# Patient Record
Sex: MALE | Race: WHITE | NOT HISPANIC OR LATINO | Employment: FULL TIME | URBAN - METROPOLITAN AREA
[De-identification: names, ages, dates, MRNs, and addresses within clinical notes are randomized per-mention and may not be internally consistent; named-entity substitution may affect disease eponyms.]

---

## 2017-12-05 ENCOUNTER — ALLSCRIPTS OFFICE VISIT (OUTPATIENT)
Dept: OTHER | Facility: OTHER | Age: 27
End: 2017-12-05

## 2017-12-05 DIAGNOSIS — F41.8 OTHER SPECIFIED ANXIETY DISORDERS: ICD-10-CM

## 2017-12-05 DIAGNOSIS — K62.5 HEMORRHAGE OF ANUS AND RECTUM: ICD-10-CM

## 2017-12-05 DIAGNOSIS — Z13.6 ENCOUNTER FOR SCREENING FOR CARDIOVASCULAR DISORDERS: ICD-10-CM

## 2017-12-06 NOTE — PROGRESS NOTES
Assessment    1  Never a smoker   2  Blood per rectum (569 3) (K62 5)    Plan  Blood per rectum    · Follow Up if Not Better Evaluation and Treatment  Follow-up  Status: Complete  Done:05Dec2017  Blood per rectum, Depression with anxiety, Encounter for screening for cardiovasculardisorders    · (1) CBC/PLT/DIFF; Status:Active; Requested for:05Dec2017;    · (1) COMPREHENSIVE METABOLIC PANEL; Status:Active; Requested for:05Dec2017;    · (1) LIPID PANEL, FASTING; Status:Active; Requested for:05Dec2017;    · (1) TSH; Status:Active; Requested for:05Dec2017;   Flu vaccine need    · Stop: Fluzone Quadrivalent Intramuscular Suspension  Screening for depression    · *VB - Depression Follow Up With Primary Care Provider Evaluation and Treatment Follow-up  Status: Hold For - Scheduling,Retrospective Authorization  Requested for:05Dec2017    Discussion/Summary    Certainly does sound like hemorrhoids  Patient deferred rectal exam  I told him to increase his fluid in fiber  If symptoms continue or increase, he will call and we will refer to GI  We will get fasting blood work  Call symptoms continue increase  Possible side effects of new medications were reviewed with the patient/guardian today  The treatment plan was reviewed with the patient/guardian  The patient/guardian understands and agrees with the treatment plan      Chief Complaint    1  Bright Red Blood Per Rectum  Blood per rectum      History of Present Illness  HPI: Patient states Thanksgiving Day had someone felt nauseated and had some regurgitation  Next day had constipation struggle to go to the bathroom and noticed blood on his stool and on the tissue  Through this past week has had blood on the stool and toilet tissue, though it is improving  Certainly when it is easier for him to go, less blood seen  Patient denies feeling any masses  Does not hurt when he goes to the bathroom  No weight loss, no fever chills  Admits to not eating a lot of fiber      Bright Red Blood Per Rectum:   Haider Ricardo presents with complaints of intermittent episodes of mild bright red blood per rectum, described as blood coating the stool, blood streaks on toilet paper and with bowel movements  Episodes started about 10 days ago  He is currently experiencing bright red blood per rectum  Symptoms are improving  Associated symptoms include constipation, but-- no abdominal pain,-- no abdominal distention,-- no nausea,-- no vomiting,-- no hematemesis,-- no diarrhea,-- no rectal mass,-- no rectal pain,-- no rectal itching,-- no rectal urgency,-- no weight loss,-- no fever-- and-- no chills  Review of Systems   Constitutional: no fever or chills, feels well, no tiredness, no recent weight loss or weight gain  Cardiovascular: no complaints of slow or fast heart rate, no chest pain, no palpitations, no leg claudication or lower extremity edema  Respiratory: no complaints of shortness of breath, no wheezing or cough, no dyspnea on exertion, no orthopnea or PND  Gastrointestinal: as noted in HPI  Genitourinary: no complaints of dysuria or incontinence, no hesitancy, no nocturia, no genital lesion, no inadequacy of penile erection  Active Problems  1  Abdominal pain (789 00) (R10 9)   2  Contact dermatitis (692 9) (L25 9)   3  Depression with anxiety (300 4) (F41 8)   4  Flu vaccine need (V04 81) (Z23)   5  Hemianopia, homonymous, left (368 46) (H53 462)   6  Local infection of wound, initial encounter   7  Screening for depression (V79 0) (Z13 89)   8  Scrotal disorder (608 9) (N50 9)   9  Skin lesion (709 9) (L98 9)   10  Tinea pedis (110 4) (B35 3)   11  Viral gastroenteritis (008 8) (A08 4)    Family History  Paternal Grandfather    1  Family history of Type 2 diabetes mellitus (250 00) (E11 9)  Family History Reviewed: The family history was reviewed and updated today         Social History   · Never a smoker   · Never Drank Alcohol  The social history was reviewed and updated today  The social history was reviewed and is unchanged  Surgical History  Surgical History Reviewed: The surgical history was reviewed and updated today  Current Meds   1  No Reported Medications Recorded    The medication list was reviewed and updated today  Allergies  1  No Known Drug Allergies    Vitals   Recorded: 09OFG1976 72:40MR   Systolic 029   Diastolic 78   Height 6 ft 1 in   Weight 218 lb 4 oz   BMI Calculated 28 79   BSA Calculated 2 23       Physical Exam   Constitutional  General appearance: No acute distress, well appearing and well nourished  Abdomen Rectal exam deferred  Psychiatric  Orientation to person, place and time: Normal    Mood and affect: Normal          Results/Data  PHQ-9 Adult Depression Screening [id number] 08:51AM User, [employer]     Test Name Result Flag Reference   PHQ-9 Adult Depression Score 11       Over the last two weeks, how often have you been bothered by any of the following problems? Little interest or pleasure in doing things: Several days - 1 Feeling down, depressed, or hopeless: Not at all - 0 Trouble falling or staying asleep, or sleeping too much: Nearly every day - 3 Feeling tired or having little energy: Nearly every day - 3 Poor appetite or over eating: Nearly every day - 3 Feeling bad about yourself - or that you are a failure or have let yourself or your family down: Not at all - 0 Trouble concentrating on things, such as reading the newspaper or watching television: Not at all - 0 Moving or speaking so slowly that other people could have noticed   Or the opposite -  being so fidgety or restless that you have been moving around a lot more than usual: Several days - 1 Thoughts that you would be better off dead, or of hurting yourself in some way: Not at all - 0   PHQ-9 Adult Depression Screening Positive     PHQ-9 Difficulty Level Somewhat difficult     PHQ-9 Severity Moderate Depression           Signatures   Electronically signed by : Cj Villanueva Glenny Patel DO; Dec  5 2017  9:27AM EST                       (Author)

## 2018-01-23 VITALS
DIASTOLIC BLOOD PRESSURE: 78 MMHG | SYSTOLIC BLOOD PRESSURE: 128 MMHG | WEIGHT: 218.25 LBS | BODY MASS INDEX: 28.93 KG/M2 | HEIGHT: 73 IN

## 2018-01-26 ENCOUNTER — TRANSCRIBE ORDERS (OUTPATIENT)
Dept: ADMINISTRATIVE | Facility: HOSPITAL | Age: 28
End: 2018-01-26

## 2018-01-26 ENCOUNTER — APPOINTMENT (OUTPATIENT)
Dept: LAB | Facility: HOSPITAL | Age: 28
End: 2018-01-26
Payer: COMMERCIAL

## 2018-01-26 DIAGNOSIS — Z13.6 ENCOUNTER FOR SCREENING FOR CARDIOVASCULAR DISORDERS: ICD-10-CM

## 2018-01-26 DIAGNOSIS — F41.8 OTHER SPECIFIED ANXIETY DISORDERS: ICD-10-CM

## 2018-01-26 DIAGNOSIS — K62.5 HEMORRHAGE OF ANUS AND RECTUM: ICD-10-CM

## 2018-01-26 LAB
ALBUMIN SERPL BCP-MCNC: 4.2 G/DL (ref 3.5–5)
ALP SERPL-CCNC: 78 U/L (ref 46–116)
ALT SERPL W P-5'-P-CCNC: 29 U/L (ref 12–78)
ANION GAP SERPL CALCULATED.3IONS-SCNC: 7 MMOL/L (ref 4–13)
AST SERPL W P-5'-P-CCNC: 22 U/L (ref 5–45)
BASOPHILS # BLD AUTO: 0.08 THOUSANDS/ΜL (ref 0–0.1)
BASOPHILS NFR BLD AUTO: 1 % (ref 0–1)
BILIRUB SERPL-MCNC: 0.6 MG/DL (ref 0.2–1)
BUN SERPL-MCNC: 13 MG/DL (ref 5–25)
CALCIUM SERPL-MCNC: 9 MG/DL (ref 8.3–10.1)
CHLORIDE SERPL-SCNC: 103 MMOL/L (ref 100–108)
CHOLEST SERPL-MCNC: 161 MG/DL (ref 50–200)
CO2 SERPL-SCNC: 29 MMOL/L (ref 21–32)
CREAT SERPL-MCNC: 1.3 MG/DL (ref 0.6–1.3)
EOSINOPHIL # BLD AUTO: 0.26 THOUSAND/ΜL (ref 0–0.61)
EOSINOPHIL NFR BLD AUTO: 4 % (ref 0–6)
ERYTHROCYTE [DISTWIDTH] IN BLOOD BY AUTOMATED COUNT: 13 % (ref 11.6–15.1)
GFR SERPL CREATININE-BSD FRML MDRD: 75 ML/MIN/1.73SQ M
GLUCOSE SERPL-MCNC: 94 MG/DL (ref 65–140)
HCT VFR BLD AUTO: 45.3 % (ref 36.5–49.3)
HDLC SERPL-MCNC: 52 MG/DL (ref 40–60)
HGB BLD-MCNC: 15.6 G/DL (ref 12–17)
LDLC SERPL CALC-MCNC: 77 MG/DL (ref 0–100)
LYMPHOCYTES # BLD AUTO: 1.26 THOUSANDS/ΜL (ref 0.6–4.47)
LYMPHOCYTES NFR BLD AUTO: 20 % (ref 14–44)
MCH RBC QN AUTO: 29.7 PG (ref 26.8–34.3)
MCHC RBC AUTO-ENTMCNC: 34.4 G/DL (ref 31.4–37.4)
MCV RBC AUTO: 86 FL (ref 82–98)
MONOCYTES # BLD AUTO: 0.54 THOUSAND/ΜL (ref 0.17–1.22)
MONOCYTES NFR BLD AUTO: 8 % (ref 4–12)
NEUTROPHILS # BLD AUTO: 4.33 THOUSANDS/ΜL (ref 1.85–7.62)
NEUTS SEG NFR BLD AUTO: 67 % (ref 43–75)
PLATELET # BLD AUTO: 246 THOUSANDS/UL (ref 149–390)
PMV BLD AUTO: 10.9 FL (ref 8.9–12.7)
POTASSIUM SERPL-SCNC: 3.9 MMOL/L (ref 3.5–5.3)
PROT SERPL-MCNC: 7.6 G/DL (ref 6.4–8.2)
RBC # BLD AUTO: 5.26 MILLION/UL (ref 3.88–5.62)
SODIUM SERPL-SCNC: 139 MMOL/L (ref 136–145)
TRIGL SERPL-MCNC: 158 MG/DL
TSH SERPL DL<=0.05 MIU/L-ACNC: 0.95 UIU/ML (ref 0.36–3.74)
WBC # BLD AUTO: 6.47 THOUSAND/UL (ref 4.31–10.16)

## 2018-01-26 PROCEDURE — 36415 COLL VENOUS BLD VENIPUNCTURE: CPT

## 2018-01-26 PROCEDURE — 85025 COMPLETE CBC W/AUTO DIFF WBC: CPT

## 2018-01-26 PROCEDURE — 80053 COMPREHEN METABOLIC PANEL: CPT

## 2018-01-26 PROCEDURE — 80061 LIPID PANEL: CPT

## 2018-01-26 PROCEDURE — 84443 ASSAY THYROID STIM HORMONE: CPT

## 2019-06-18 ENCOUNTER — OFFICE VISIT (OUTPATIENT)
Dept: URGENT CARE | Facility: CLINIC | Age: 29
End: 2019-06-18
Payer: COMMERCIAL

## 2019-06-18 VITALS
SYSTOLIC BLOOD PRESSURE: 138 MMHG | WEIGHT: 209 LBS | OXYGEN SATURATION: 98 % | DIASTOLIC BLOOD PRESSURE: 88 MMHG | BODY MASS INDEX: 27.7 KG/M2 | HEIGHT: 73 IN | TEMPERATURE: 98.6 F | HEART RATE: 88 BPM | RESPIRATION RATE: 20 BRPM

## 2019-06-18 DIAGNOSIS — R19.7 DIARRHEA, UNSPECIFIED TYPE: Primary | ICD-10-CM

## 2019-06-18 PROCEDURE — 99213 OFFICE O/P EST LOW 20 MIN: CPT | Performed by: PHYSICIAN ASSISTANT

## 2019-06-18 RX ORDER — DICYCLOMINE HCL 20 MG
20 TABLET ORAL EVERY 6 HOURS
Qty: 30 TABLET | Refills: 0 | Status: SHIPPED | OUTPATIENT
Start: 2019-06-18 | End: 2019-06-28 | Stop reason: HOSPADM

## 2019-06-20 ENCOUNTER — HOSPITAL ENCOUNTER (EMERGENCY)
Facility: HOSPITAL | Age: 29
Discharge: HOME | End: 2019-06-20
Attending: EMERGENCY MEDICINE

## 2019-06-20 ENCOUNTER — APPOINTMENT (EMERGENCY)
Dept: CT IMAGING | Facility: HOSPITAL | Age: 29
End: 2019-06-20

## 2019-06-20 VITALS
HEART RATE: 90 BPM | TEMPERATURE: 97.5 F | SYSTOLIC BLOOD PRESSURE: 129 MMHG | RESPIRATION RATE: 18 BRPM | DIASTOLIC BLOOD PRESSURE: 73 MMHG | OXYGEN SATURATION: 98 % | BODY MASS INDEX: 28.42 KG/M2 | WEIGHT: 215.39 LBS

## 2019-06-20 DIAGNOSIS — K51.00 PANCOLITIS (HCC): Primary | ICD-10-CM

## 2019-06-20 LAB
ALBUMIN SERPL BCP-MCNC: 3.3 G/DL (ref 3.5–5)
ALP SERPL-CCNC: 119 U/L (ref 46–116)
ALT SERPL W P-5'-P-CCNC: 28 U/L (ref 12–78)
ANION GAP SERPL CALCULATED.3IONS-SCNC: 9 MMOL/L (ref 4–13)
AST SERPL W P-5'-P-CCNC: 16 U/L (ref 5–45)
BASOPHILS # BLD AUTO: 0.1 THOUSANDS/ΜL (ref 0–0.1)
BASOPHILS NFR BLD AUTO: 1 % (ref 0–1)
BILIRUB SERPL-MCNC: 0.4 MG/DL (ref 0.2–1)
BUN SERPL-MCNC: 11 MG/DL (ref 5–25)
CALCIUM SERPL-MCNC: 8.7 MG/DL (ref 8.3–10.1)
CAMPYLOBACTER DNA SPEC NAA+PROBE: NORMAL
CHLORIDE SERPL-SCNC: 103 MMOL/L (ref 100–108)
CO2 SERPL-SCNC: 29 MMOL/L (ref 21–32)
CREAT SERPL-MCNC: 1.48 MG/DL (ref 0.6–1.3)
EOSINOPHIL # BLD AUTO: 0.61 THOUSAND/ΜL (ref 0–0.61)
EOSINOPHIL NFR BLD AUTO: 4 % (ref 0–6)
ERYTHROCYTE [DISTWIDTH] IN BLOOD BY AUTOMATED COUNT: 12.4 % (ref 11.6–15.1)
GFR SERPL CREATININE-BSD FRML MDRD: 64 ML/MIN/1.73SQ M
GLUCOSE SERPL-MCNC: 88 MG/DL (ref 65–140)
HCT VFR BLD AUTO: 41.9 % (ref 36.5–49.3)
HGB BLD-MCNC: 13.8 G/DL (ref 12–17)
IMM GRANULOCYTES # BLD AUTO: 0.1 THOUSAND/UL (ref 0–0.2)
IMM GRANULOCYTES NFR BLD AUTO: 1 % (ref 0–2)
LIPASE SERPL-CCNC: 70 U/L (ref 73–393)
LYMPHOCYTES # BLD AUTO: 2.1 THOUSANDS/ΜL (ref 0.6–4.47)
LYMPHOCYTES NFR BLD AUTO: 13 % (ref 14–44)
MCH RBC QN AUTO: 29.4 PG (ref 26.8–34.3)
MCHC RBC AUTO-ENTMCNC: 32.9 G/DL (ref 31.4–37.4)
MCV RBC AUTO: 89 FL (ref 82–98)
MONOCYTES # BLD AUTO: 1.43 THOUSAND/ΜL (ref 0.17–1.22)
MONOCYTES NFR BLD AUTO: 9 % (ref 4–12)
NEUTROPHILS # BLD AUTO: 12.11 THOUSANDS/ΜL (ref 1.85–7.62)
NEUTS SEG NFR BLD AUTO: 72 % (ref 43–75)
NRBC BLD AUTO-RTO: 0 /100 WBCS
PLATELET # BLD AUTO: 378 THOUSANDS/UL (ref 149–390)
PMV BLD AUTO: 10.1 FL (ref 8.9–12.7)
POTASSIUM SERPL-SCNC: 3.3 MMOL/L (ref 3.5–5.3)
PROT SERPL-MCNC: 7.8 G/DL (ref 6.4–8.2)
RBC # BLD AUTO: 4.7 MILLION/UL (ref 3.88–5.62)
SALMONELLA DNA SPEC QL NAA+PROBE: NORMAL
SHIGA TOXIN STX GENE SPEC NAA+PROBE: NORMAL
SHIGELLA DNA SPEC QL NAA+PROBE: NORMAL
SODIUM SERPL-SCNC: 141 MMOL/L (ref 136–145)
WBC # BLD AUTO: 16.45 THOUSAND/UL (ref 4.31–10.16)

## 2019-06-20 PROCEDURE — 89055 LEUKOCYTE ASSESSMENT FECAL: CPT | Performed by: EMERGENCY MEDICINE

## 2019-06-20 PROCEDURE — 85025 COMPLETE CBC W/AUTO DIFF WBC: CPT | Performed by: EMERGENCY MEDICINE

## 2019-06-20 PROCEDURE — 36415 COLL VENOUS BLD VENIPUNCTURE: CPT | Performed by: EMERGENCY MEDICINE

## 2019-06-20 PROCEDURE — 87505 NFCT AGENT DETECTION GI: CPT | Performed by: EMERGENCY MEDICINE

## 2019-06-20 PROCEDURE — 74177 CT ABD & PELVIS W/CONTRAST: CPT

## 2019-06-20 PROCEDURE — 87177 OVA AND PARASITES SMEARS: CPT | Performed by: EMERGENCY MEDICINE

## 2019-06-20 PROCEDURE — 80053 COMPREHEN METABOLIC PANEL: CPT | Performed by: EMERGENCY MEDICINE

## 2019-06-20 PROCEDURE — 99285 EMERGENCY DEPT VISIT HI MDM: CPT

## 2019-06-20 PROCEDURE — 83690 ASSAY OF LIPASE: CPT | Performed by: EMERGENCY MEDICINE

## 2019-06-20 PROCEDURE — 99284 EMERGENCY DEPT VISIT MOD MDM: CPT | Performed by: EMERGENCY MEDICINE

## 2019-06-20 PROCEDURE — 87209 SMEAR COMPLEX STAIN: CPT | Performed by: EMERGENCY MEDICINE

## 2019-06-20 PROCEDURE — 96360 HYDRATION IV INFUSION INIT: CPT

## 2019-06-20 RX ORDER — METRONIDAZOLE 500 MG/1
500 TABLET ORAL EVERY 6 HOURS
Qty: 40 TABLET | Refills: 0 | Status: SHIPPED | OUTPATIENT
Start: 2019-06-20 | End: 2019-06-28 | Stop reason: HOSPADM

## 2019-06-20 RX ORDER — CIPROFLOXACIN 2 MG/ML
400 INJECTION, SOLUTION INTRAVENOUS ONCE
Status: DISCONTINUED | OUTPATIENT
Start: 2019-06-20 | End: 2019-06-20

## 2019-06-20 RX ORDER — METRONIDAZOLE 500 MG/1
500 TABLET ORAL ONCE
Status: COMPLETED | OUTPATIENT
Start: 2019-06-20 | End: 2019-06-20

## 2019-06-20 RX ORDER — CIPROFLOXACIN 500 MG/1
500 TABLET, FILM COATED ORAL ONCE
Status: COMPLETED | OUTPATIENT
Start: 2019-06-20 | End: 2019-06-20

## 2019-06-20 RX ORDER — CIPROFLOXACIN 500 MG/1
500 TABLET, FILM COATED ORAL 2 TIMES DAILY
Qty: 20 TABLET | Refills: 0 | Status: SHIPPED | OUTPATIENT
Start: 2019-06-20 | End: 2019-06-28 | Stop reason: HOSPADM

## 2019-06-20 RX ORDER — POTASSIUM CHLORIDE 20 MEQ/1
40 TABLET, EXTENDED RELEASE ORAL ONCE
Status: COMPLETED | OUTPATIENT
Start: 2019-06-20 | End: 2019-06-20

## 2019-06-20 RX ADMIN — IOHEXOL 100 ML: 350 INJECTION, SOLUTION INTRAVENOUS at 04:35

## 2019-06-20 RX ADMIN — METRONIDAZOLE 500 MG: 500 TABLET, FILM COATED ORAL at 05:55

## 2019-06-20 RX ADMIN — CIPROFLOXACIN 500 MG: 500 TABLET, FILM COATED ORAL at 05:55

## 2019-06-20 RX ADMIN — SODIUM CHLORIDE 1000 ML: 0.9 INJECTION, SOLUTION INTRAVENOUS at 03:15

## 2019-06-20 RX ADMIN — POTASSIUM CHLORIDE 40 MEQ: 1500 TABLET, EXTENDED RELEASE ORAL at 05:55

## 2019-06-22 LAB — O+P STL CONC: NORMAL

## 2019-06-24 ENCOUNTER — HOSPITAL ENCOUNTER (INPATIENT)
Facility: HOSPITAL | Age: 29
LOS: 4 days | Discharge: HOME | DRG: 386 | End: 2019-06-28
Attending: EMERGENCY MEDICINE | Admitting: FAMILY MEDICINE

## 2019-06-24 ENCOUNTER — APPOINTMENT (EMERGENCY)
Dept: RADIOLOGY | Facility: HOSPITAL | Age: 29
DRG: 386 | End: 2019-06-24

## 2019-06-24 ENCOUNTER — APPOINTMENT (EMERGENCY)
Dept: CT IMAGING | Facility: HOSPITAL | Age: 29
DRG: 386 | End: 2019-06-24

## 2019-06-24 DIAGNOSIS — K51.019 ULCERATIVE PANCOLITIS WITH COMPLICATION (HCC): ICD-10-CM

## 2019-06-24 DIAGNOSIS — K51.00 PANCOLITIS (HCC): Primary | ICD-10-CM

## 2019-06-24 PROBLEM — N17.9 AKI (ACUTE KIDNEY INJURY) (HCC): Status: ACTIVE | Noted: 2019-06-24

## 2019-06-24 LAB
ALBUMIN SERPL BCP-MCNC: 3.6 G/DL (ref 3.5–5)
ALP SERPL-CCNC: 109 U/L (ref 46–116)
ALT SERPL W P-5'-P-CCNC: 25 U/L (ref 12–78)
ANION GAP SERPL CALCULATED.3IONS-SCNC: 7 MMOL/L (ref 4–13)
APTT PPP: 37 SECONDS (ref 26–38)
AST SERPL W P-5'-P-CCNC: 26 U/L (ref 5–45)
ATRIAL RATE: 88 BPM
BACTERIA UR QL AUTO: ABNORMAL /HPF
BASOPHILS # BLD AUTO: 0.11 THOUSANDS/ΜL (ref 0–0.1)
BASOPHILS NFR BLD AUTO: 1 % (ref 0–1)
BILIRUB SERPL-MCNC: 0.5 MG/DL (ref 0.2–1)
BILIRUB UR QL STRIP: NEGATIVE
BUN SERPL-MCNC: 9 MG/DL (ref 5–25)
CALCIUM SERPL-MCNC: 9.4 MG/DL (ref 8.3–10.1)
CHLORIDE SERPL-SCNC: 100 MMOL/L (ref 100–108)
CK SERPL-CCNC: 67 U/L (ref 39–308)
CLARITY UR: CLEAR
CO2 SERPL-SCNC: 30 MMOL/L (ref 21–32)
COLOR UR: YELLOW
CREAT SERPL-MCNC: 1.48 MG/DL (ref 0.6–1.3)
EOSINOPHIL # BLD AUTO: 0.25 THOUSAND/ΜL (ref 0–0.61)
EOSINOPHIL NFR BLD AUTO: 2 % (ref 0–6)
ERYTHROCYTE [DISTWIDTH] IN BLOOD BY AUTOMATED COUNT: 12.5 % (ref 11.6–15.1)
GFR SERPL CREATININE-BSD FRML MDRD: 64 ML/MIN/1.73SQ M
GLUCOSE SERPL-MCNC: 100 MG/DL (ref 65–140)
GLUCOSE UR STRIP-MCNC: NEGATIVE MG/DL
HCT VFR BLD AUTO: 49.9 % (ref 36.5–49.3)
HGB BLD-MCNC: 16.3 G/DL (ref 12–17)
HGB UR QL STRIP.AUTO: NEGATIVE
HOLD SPECIMEN: NORMAL
IMM GRANULOCYTES # BLD AUTO: 0.09 THOUSAND/UL (ref 0–0.2)
IMM GRANULOCYTES NFR BLD AUTO: 1 % (ref 0–2)
INR PPP: 1.21 (ref 0.86–1.17)
KETONES UR STRIP-MCNC: ABNORMAL MG/DL
LACTATE SERPL-SCNC: 1.5 MMOL/L (ref 0.5–2)
LEUKOCYTE ESTERASE UR QL STRIP: ABNORMAL
LIPASE SERPL-CCNC: 60 U/L (ref 73–393)
LYMPHOCYTES # BLD AUTO: 1.57 THOUSANDS/ΜL (ref 0.6–4.47)
LYMPHOCYTES NFR BLD AUTO: 10 % (ref 14–44)
MAGNESIUM SERPL-MCNC: 1.9 MG/DL (ref 1.6–2.6)
MCH RBC QN AUTO: 28.5 PG (ref 26.8–34.3)
MCHC RBC AUTO-ENTMCNC: 32.7 G/DL (ref 31.4–37.4)
MCV RBC AUTO: 87 FL (ref 82–98)
MONOCYTES # BLD AUTO: 1.02 THOUSAND/ΜL (ref 0.17–1.22)
MONOCYTES NFR BLD AUTO: 7 % (ref 4–12)
NEUTROPHILS # BLD AUTO: 12.25 THOUSANDS/ΜL (ref 1.85–7.62)
NEUTS SEG NFR BLD AUTO: 79 % (ref 43–75)
NITRITE UR QL STRIP: NEGATIVE
NON-SQ EPI CELLS URNS QL MICRO: ABNORMAL /HPF
NRBC BLD AUTO-RTO: 0 /100 WBCS
P AXIS: 66 DEGREES
PH UR STRIP.AUTO: 5.5 [PH]
PLATELET # BLD AUTO: 531 THOUSANDS/UL (ref 149–390)
PMV BLD AUTO: 9.8 FL (ref 8.9–12.7)
POTASSIUM SERPL-SCNC: 3.6 MMOL/L (ref 3.5–5.3)
PR INTERVAL: 164 MS
PROCALCITONIN SERPL-MCNC: 0.08 NG/ML
PROCALCITONIN SERPL-MCNC: 0.13 NG/ML
PROT SERPL-MCNC: 8.5 G/DL (ref 6.4–8.2)
PROT UR STRIP-MCNC: NEGATIVE MG/DL
PROTHROMBIN TIME: 14.7 SECONDS (ref 11.8–14.2)
QRS AXIS: 70 DEGREES
QRSD INTERVAL: 84 MS
QT INTERVAL: 376 MS
QTC INTERVAL: 454 MS
RBC # BLD AUTO: 5.71 MILLION/UL (ref 3.88–5.62)
RBC #/AREA URNS AUTO: ABNORMAL /HPF
SODIUM SERPL-SCNC: 137 MMOL/L (ref 136–145)
SP GR UR STRIP.AUTO: 1.02 (ref 1–1.03)
T WAVE AXIS: 24 DEGREES
TROPONIN I SERPL-MCNC: <0.02 NG/ML
UROBILINOGEN UR QL STRIP.AUTO: 0.2 E.U./DL
VENTRICULAR RATE: 88 BPM
WBC # BLD AUTO: 15.29 THOUSAND/UL (ref 4.31–10.16)
WBC #/AREA URNS AUTO: ABNORMAL /HPF
WBC SPEC QL GRAM STN: ABNORMAL

## 2019-06-24 PROCEDURE — 84484 ASSAY OF TROPONIN QUANT: CPT

## 2019-06-24 PROCEDURE — 93005 ELECTROCARDIOGRAM TRACING: CPT

## 2019-06-24 PROCEDURE — 87177 OVA AND PARASITES SMEARS: CPT | Performed by: FAMILY MEDICINE

## 2019-06-24 PROCEDURE — 96365 THER/PROPH/DIAG IV INF INIT: CPT

## 2019-06-24 PROCEDURE — 96375 TX/PRO/DX INJ NEW DRUG ADDON: CPT

## 2019-06-24 PROCEDURE — 84145 PROCALCITONIN (PCT): CPT | Performed by: FAMILY MEDICINE

## 2019-06-24 PROCEDURE — 99222 1ST HOSP IP/OBS MODERATE 55: CPT | Performed by: FAMILY MEDICINE

## 2019-06-24 PROCEDURE — 81001 URINALYSIS AUTO W/SCOPE: CPT

## 2019-06-24 PROCEDURE — 87209 SMEAR COMPLEX STAIN: CPT | Performed by: FAMILY MEDICINE

## 2019-06-24 PROCEDURE — 87040 BLOOD CULTURE FOR BACTERIA: CPT | Performed by: EMERGENCY MEDICINE

## 2019-06-24 PROCEDURE — 83735 ASSAY OF MAGNESIUM: CPT | Performed by: EMERGENCY MEDICINE

## 2019-06-24 PROCEDURE — 85025 COMPLETE CBC W/AUTO DIFF WBC: CPT

## 2019-06-24 PROCEDURE — 85730 THROMBOPLASTIN TIME PARTIAL: CPT | Performed by: EMERGENCY MEDICINE

## 2019-06-24 PROCEDURE — 93010 ELECTROCARDIOGRAM REPORT: CPT | Performed by: INTERNAL MEDICINE

## 2019-06-24 PROCEDURE — 71045 X-RAY EXAM CHEST 1 VIEW: CPT

## 2019-06-24 PROCEDURE — 87505 NFCT AGENT DETECTION GI: CPT | Performed by: FAMILY MEDICINE

## 2019-06-24 PROCEDURE — 83690 ASSAY OF LIPASE: CPT

## 2019-06-24 PROCEDURE — 85610 PROTHROMBIN TIME: CPT | Performed by: EMERGENCY MEDICINE

## 2019-06-24 PROCEDURE — 96368 THER/DIAG CONCURRENT INF: CPT

## 2019-06-24 PROCEDURE — 89055 LEUKOCYTE ASSESSMENT FECAL: CPT | Performed by: FAMILY MEDICINE

## 2019-06-24 PROCEDURE — 87493 C DIFF AMPLIFIED PROBE: CPT | Performed by: FAMILY MEDICINE

## 2019-06-24 PROCEDURE — 80053 COMPREHEN METABOLIC PANEL: CPT

## 2019-06-24 PROCEDURE — 99285 EMERGENCY DEPT VISIT HI MDM: CPT | Performed by: EMERGENCY MEDICINE

## 2019-06-24 PROCEDURE — 83605 ASSAY OF LACTIC ACID: CPT | Performed by: EMERGENCY MEDICINE

## 2019-06-24 PROCEDURE — 84145 PROCALCITONIN (PCT): CPT | Performed by: EMERGENCY MEDICINE

## 2019-06-24 PROCEDURE — 83993 ASSAY FOR CALPROTECTIN FECAL: CPT | Performed by: FAMILY MEDICINE

## 2019-06-24 PROCEDURE — 74177 CT ABD & PELVIS W/CONTRAST: CPT

## 2019-06-24 PROCEDURE — 82550 ASSAY OF CK (CPK): CPT | Performed by: EMERGENCY MEDICINE

## 2019-06-24 PROCEDURE — 36415 COLL VENOUS BLD VENIPUNCTURE: CPT

## 2019-06-24 PROCEDURE — 82272 OCCULT BLD FECES 1-3 TESTS: CPT | Performed by: FAMILY MEDICINE

## 2019-06-24 PROCEDURE — 96376 TX/PRO/DX INJ SAME DRUG ADON: CPT

## 2019-06-24 PROCEDURE — 99285 EMERGENCY DEPT VISIT HI MDM: CPT

## 2019-06-24 RX ORDER — ONDANSETRON 2 MG/ML
4 INJECTION INTRAMUSCULAR; INTRAVENOUS ONCE
Status: COMPLETED | OUTPATIENT
Start: 2019-06-24 | End: 2019-06-24

## 2019-06-24 RX ORDER — LORAZEPAM 2 MG/ML
1 INJECTION INTRAMUSCULAR ONCE
Status: COMPLETED | OUTPATIENT
Start: 2019-06-24 | End: 2019-06-24

## 2019-06-24 RX ORDER — HYDROMORPHONE HCL/PF 1 MG/ML
1 SYRINGE (ML) INJECTION ONCE
Status: COMPLETED | OUTPATIENT
Start: 2019-06-24 | End: 2019-06-24

## 2019-06-24 RX ORDER — SODIUM CHLORIDE 9 MG/ML
125 INJECTION, SOLUTION INTRAVENOUS CONTINUOUS
Status: DISCONTINUED | OUTPATIENT
Start: 2019-06-24 | End: 2019-06-27

## 2019-06-24 RX ORDER — ONDANSETRON 2 MG/ML
INJECTION INTRAMUSCULAR; INTRAVENOUS
Status: COMPLETED
Start: 2019-06-24 | End: 2019-06-24

## 2019-06-24 RX ORDER — ONDANSETRON 2 MG/ML
4 INJECTION INTRAMUSCULAR; INTRAVENOUS EVERY 4 HOURS PRN
Status: DISCONTINUED | OUTPATIENT
Start: 2019-06-24 | End: 2019-06-28 | Stop reason: HOSPADM

## 2019-06-24 RX ORDER — KETOROLAC TROMETHAMINE 30 MG/ML
30 INJECTION, SOLUTION INTRAMUSCULAR; INTRAVENOUS ONCE
Status: COMPLETED | OUTPATIENT
Start: 2019-06-24 | End: 2019-06-24

## 2019-06-24 RX ORDER — PROMETHAZINE HYDROCHLORIDE 25 MG/ML
25 INJECTION, SOLUTION INTRAMUSCULAR; INTRAVENOUS ONCE
Status: COMPLETED | OUTPATIENT
Start: 2019-06-24 | End: 2019-06-24

## 2019-06-24 RX ADMIN — VANCOMYCIN HYDROCHLORIDE 125 MG: 500 INJECTION, POWDER, LYOPHILIZED, FOR SOLUTION INTRAVENOUS at 17:08

## 2019-06-24 RX ADMIN — ONDANSETRON 4 MG: 2 INJECTION INTRAMUSCULAR; INTRAVENOUS at 06:14

## 2019-06-24 RX ADMIN — VANCOMYCIN HYDROCHLORIDE 125 MG: 500 INJECTION, POWDER, LYOPHILIZED, FOR SOLUTION INTRAVENOUS at 12:21

## 2019-06-24 RX ADMIN — SODIUM CHLORIDE, SODIUM LACTATE, POTASSIUM CHLORIDE, AND CALCIUM CHLORIDE 1000 ML: .6; .31; .03; .02 INJECTION, SOLUTION INTRAVENOUS at 07:22

## 2019-06-24 RX ADMIN — ONDANSETRON 4 MG: 2 INJECTION INTRAMUSCULAR; INTRAVENOUS at 09:05

## 2019-06-24 RX ADMIN — IOHEXOL 100 ML: 350 INJECTION, SOLUTION INTRAVENOUS at 10:02

## 2019-06-24 RX ADMIN — SODIUM CHLORIDE 1000 ML: 0.9 INJECTION, SOLUTION INTRAVENOUS at 06:14

## 2019-06-24 RX ADMIN — VANCOMYCIN HYDROCHLORIDE 125 MG: 500 INJECTION, POWDER, LYOPHILIZED, FOR SOLUTION INTRAVENOUS at 23:51

## 2019-06-24 RX ADMIN — PIPERACILLIN AND TAZOBACTAM 3.38 G: 3; .375 INJECTION, POWDER, FOR SOLUTION INTRAVENOUS at 17:08

## 2019-06-24 RX ADMIN — PROMETHAZINE HYDROCHLORIDE 25 MG: 25 INJECTION, SOLUTION INTRAMUSCULAR; INTRAVENOUS at 11:00

## 2019-06-24 RX ADMIN — PIPERACILLIN AND TAZOBACTAM 3.38 G: 3; .375 INJECTION, POWDER, FOR SOLUTION INTRAVENOUS at 23:46

## 2019-06-24 RX ADMIN — KETOROLAC TROMETHAMINE 30 MG: 30 INJECTION, SOLUTION INTRAMUSCULAR; INTRAVENOUS at 23:46

## 2019-06-24 RX ADMIN — HYDROMORPHONE HYDROCHLORIDE 0.5 MG: 1 INJECTION, SOLUTION INTRAMUSCULAR; INTRAVENOUS; SUBCUTANEOUS at 06:14

## 2019-06-24 RX ADMIN — LORAZEPAM 1 MG: 2 INJECTION INTRAMUSCULAR; INTRAVENOUS at 06:26

## 2019-06-24 RX ADMIN — SODIUM CHLORIDE, SODIUM LACTATE, POTASSIUM CHLORIDE, AND CALCIUM CHLORIDE 1000 ML: .6; .31; .03; .02 INJECTION, SOLUTION INTRAVENOUS at 06:48

## 2019-06-24 RX ADMIN — SODIUM CHLORIDE, SODIUM LACTATE, POTASSIUM CHLORIDE, AND CALCIUM CHLORIDE 1000 ML: .6; .31; .03; .02 INJECTION, SOLUTION INTRAVENOUS at 07:54

## 2019-06-24 RX ADMIN — PIPERACILLIN AND TAZOBACTAM 3.38 G: 3; .375 INJECTION, POWDER, FOR SOLUTION INTRAVENOUS at 12:21

## 2019-06-24 RX ADMIN — SODIUM CHLORIDE 100 ML/HR: 0.9 INJECTION, SOLUTION INTRAVENOUS at 22:31

## 2019-06-24 RX ADMIN — PIPERACILLIN SODIUM AND TAZOBACTAM SODIUM 4500 MG OF PIPERACILLIN: 4; .5 INJECTION, POWDER, FOR SOLUTION INTRAVENOUS at 06:49

## 2019-06-24 RX ADMIN — SODIUM CHLORIDE 100 ML/HR: 0.9 INJECTION, SOLUTION INTRAVENOUS at 11:43

## 2019-06-24 RX ADMIN — IOHEXOL 50 ML: 240 INJECTION, SOLUTION INTRATHECAL; INTRAVASCULAR; INTRAVENOUS; ORAL at 08:12

## 2019-06-24 NOTE — PLAN OF CARE
Problem: GASTROINTESTINAL - ADULT  Goal: Minimal or absence of nausea and/or vomiting  Description  INTERVENTIONS:  - Administer IV fluids as ordered to ensure adequate hydration  - Maintain NPO status until nausea and vomiting are resolved  - Nasogastric tube as ordered  - Administer ordered antiemetic medications as needed  - Provide nonpharmacologic comfort measures as appropriate  - Advance diet as tolerated, if ordered  - Nutrition services referral to assist patient with adequate nutrition and appropriate food choices  Outcome: Progressing  Goal: Maintains adequate nutritional intake  Description  INTERVENTIONS:  - Monitor percentage of each meal consumed  - Identify factors contributing to decreased intake, treat as appropriate  - Assist with meals as needed  - Monitor I&O, WT and lab values  - Obtain nutrition services referral as needed  Outcome: Progressing     Problem: PAIN - ADULT  Goal: Verbalizes/displays adequate comfort level or baseline comfort level  Description  Interventions:  - Encourage patient to monitor pain and request assistance  - Assess pain using appropriate pain scale  - Administer analgesics based on type and severity of pain and evaluate response  - Implement non-pharmacological measures as appropriate and evaluate response  - Consider cultural and social influences on pain and pain management  - Notify physician/advanced practitioner if interventions unsuccessful or patient reports new pain  Outcome: Progressing     Problem: INFECTION - ADULT  Goal: Absence or prevention of progression during hospitalization  Description  INTERVENTIONS:  - Assess and monitor for signs and symptoms of infection  - Monitor lab/diagnostic results  - Monitor all insertion sites  - [place] appropriate cooling/warming therapies per order  - Administer medications as ordered  - Instruct and encourage patient and family to use good hand hygiene technique  - Identify and instruct in appropriate isolation precautions for identified infection/condition   Outcome: Progressing     Problem: Knowledge Deficit  Goal: Patient/family/caregiver demonstrates understanding of disease process, treatment plan, medications, and discharge instructions  Description  Complete learning assessment and assess knowledge base  Interventions:  - Provide teaching at level of understanding  - Provide teaching via preferred learning methods  Outcome: Progressing     Problem: Nutrition/Hydration-ADULT  Goal: Nutrient/Hydration intake appropriate for improving, restoring or maintaining nutritional needs  Description  Monitor and assess patient's nutrition/hydration status for malnutrition (ex- brittle hair, bruises, dry skin, pale skin and conjunctiva, muscle wasting, smooth red tongue, and disorientation)  Collaborate with interdisciplinary team and initiate plan and interventions as ordered  Monitor patient's weight and dietary intake as ordered or per policy  Utilize nutrition screening tool and intervene per policy  Determine patient's food preferences and provide high-protein, high-caloric foods as appropriate       INTERVENTIONS:  - Monitor oral intake, urinary output, labs, and treatment plans  - Assess nutrition and hydration status and recommend course of action  - Evaluate amount of meals eaten  - Assist patient with eating if necessary   - Allow adequate time for meals  - Recommend/ encourage appropriate diets, oral nutritional supplements, and vitamin/mineral supplements  - Order, calculate, and assess calorie counts as needed  - Recommend, monitor, and adjust tube feedings and TPN/PPN based on assessed needs  - Assess need for intravenous fluids  - Provide specific nutrition/hydration education as appropriate  - Include patient/family/caregiver in decisions related to nutrition  Outcome: Progressing

## 2019-06-24 NOTE — ED NOTES
Patient had episode of vomiting at this time, MD made aware and verbal order for 4mg of zofran was given       Samir Beckwith RN  06/24/19 7734

## 2019-06-24 NOTE — H&P
H&P Exam - Javier Dominguez 34 y o  male MRN: [id number]    Unit/Bed#: 500-10 Encounter: [id number]        OMA (acute kidney injury) [facility])  Assessment & Plan  Likely secondary to pre renal azotemia  Normal saline infusion and recheck renal function tomorrow morning    Pancolitis (HCC)  Assessment & Plan  Symptoms ongoing for past 6-8 weeks  Check for C diff and empirically treat with PO vanco until results are available  Check Enteric Panel  Check fecal Calprotectin   Check O&P   Check Fecal Leucocytes  Heme Test all stool  Empirically treat with IV Zosyn   Consult Gastroenterology  NPO Status  IVF  Antiemetics          History of Present Illness      The patient is a pleasant 29-year-old male without a significant past medical presents to emergency room today with complaint of worsening nausea, abdominal pain and diarrhea  Patient states symptoms began approximately 8 weeks ago and were initially flatulence and abdominal discomfort  He describes his flatulence as very foul smelling  As time progressed he began having loose watery dark and diarrhea and occurring 1 to 2 times a day  Ultimately he was having significant amount of bowel movements that were watery, he was unable to keep anything down by mouth, and over the past week he has noted bright blood per rectum when wiping  He stated he had fevers in the past 8 weeks but but has been afebrile over the past week  He denies any recent antibiotic use    He denies any recent travel / drinking well water / pond water    No sick contacts    First degree family member has crohns     Review of Systems   Constitutional: Positive for fever  Gastrointestinal: Positive for abdominal distention, abdominal pain, anal bleeding, blood in stool, diarrhea and nausea  All other systems reviewed and are negative  Historical Information   Past Medical History:   Diagnosis Date    Rhabdomyolysis 2011     History reviewed  No pertinent surgical history    Social History   Social History     Substance and Sexual Activity   Alcohol Use Not Currently    Frequency: Monthly or less    Drinks per session: 1 or 2     Social History     Substance and Sexual Activity   Drug Use Never     Social History     Tobacco Use   Smoking Status Never Smoker   Smokeless Tobacco Never Used     Family History: non-contributory    Meds/Allergies   all medications and allergies reviewed  No Known Allergies    Objective   First Vitals:   Blood Pressure: 140/78 (06/24/19 0555)  Pulse: 94 (06/24/19 0555)  Temperature: 98 1 °F (36 7 °C) (06/24/19 0555)  Temp Source: Temporal (06/24/19 0555)  Respirations: 18 (06/24/19 0555)  Height: 6' 1" (185 4 cm) (06/24/19 0555)  Weight - Scale: 91 7 kg (202 lb 2 6 oz) (06/24/19 0555)  SpO2: 99 % (06/24/19 0555)    Current Vitals:   Blood Pressure: 142/72 (06/24/19 1030)  Pulse: 85 (06/24/19 1030)  Temperature: 98 1 °F (36 7 °C) (06/24/19 0555)  Temp Source: Temporal (06/24/19 0555)  Respirations: 18 (06/24/19 1030)  Height: 6' 1" (185 4 cm) (06/24/19 0555)  Weight - Scale: 91 7 kg (202 lb 2 6 oz) (06/24/19 0555)  SpO2: 94 % (06/24/19 1030)      Intake/Output Summary (Last 24 hours) at 6/24/2019 1119  Last data filed at 6/24/2019 0814  Gross per 24 hour   Intake 3700 ml   Output    Net 3700 ml       Invasive Devices     Peripheral Intravenous Line            Peripheral IV 06/24/19 Right Antecubital less than 1 day                Physical Exam   Constitutional: He appears well-developed  HENT:   Head: Normocephalic and atraumatic  Mouth/Throat: No oropharyngeal exudate  Eyes: No scleral icterus  Neck: Neck supple  Cardiovascular: Normal rate and regular rhythm  Pulmonary/Chest: Effort normal and breath sounds normal  No stridor  No respiratory distress  He has no wheezes  Abdominal: Soft  Bowel sounds are normal  He exhibits no distension and no mass  There is no tenderness  There is no guarding  Musculoskeletal: Normal range of motion   He exhibits no edema  Neurological: He is alert  No cranial nerve deficit  Coordination normal    Skin: Skin is warm  Capillary refill takes 2 to 3 seconds  He is not diaphoretic  Lab Results:   Lab Results   Component Value Date    WBC 15 29 (H) 06/24/2019    HGB 16 3 06/24/2019    HCT 49 9 (H) 06/24/2019    MCV 87 06/24/2019     (H) 06/24/2019     Lab Results   Component Value Date    GLUCOSE 93 03/31/2015    CALCIUM 9 4 06/24/2019     03/31/2015    SODIUM 137 06/24/2019    K 3 6 06/24/2019    CO2 30 06/24/2019     06/24/2019    BUN 9 06/24/2019    CREATININE 1 48 (H) 06/24/2019       Imaging:   CT abdomen pelvis with contrast   Final Result   1  Pancolitis again demonstrated with wall thickening and adjacent fat stranding predominantly involving the transverse, left and rectosigmoid colon  This appears slightly improved from the prior exam   Continued follow-up to resolution suggested  Workstation performed: [id number]         XR chest portable   Final Result      No acute cardiopulmonary disease              Workstation performed: [id number]             EKG, Pathology, and Other Studies: Sinus    Code Status: Level 1 - Full Code  Advance Directive and Living Will:      Power of :    POLST:      Counseling / Coordination of Care:

## 2019-06-24 NOTE — ED PROCEDURE NOTE
PROCEDURE  ECG 12 Lead Documentation  Date/Time: 6/24/2019 7:02 AM  Performed by: Humphrey Urban MD  Authorized by: MD Humphrey Benavidez MD  07/07/19 5918

## 2019-06-24 NOTE — ASSESSMENT & PLAN NOTE
Symptoms ongoing for past 6-8 weeks, associated with 30lb weight loss, cramping abdominal pain, and poor oral intake  No hematochezia  Notes family hx of IBD  Treated with bentyl, cipro and flagyl with no improvement in outpatient setting     Check for C diff and empirically treat with PO vanco until results are available  Check Enteric Panel, fecal Calprotectin, O&P, Leucocytes  Heme Test all stool  Empirically treat with IV Zosyn   Consult Gastroenterology  NPO Status for now   IVF, antiemetics

## 2019-06-24 NOTE — ED PROCEDURE NOTE
PROCEDURE  ECG 12 Lead Documentation  Date/Time: 6/24/2019 7:00 AM  Performed by: Anderson Mujica MD  Authorized by: Anderson Mujica MD     Indications / Diagnosis:  88  ECG reviewed by me, the ED Provider: yes    Patient location:  ED  Previous ECG:     Comparison to cardiac monitor: Yes    Interpretation:     Interpretation: normal    Rate:     ECG rate:  88    ECG rate assessment: normal    Rhythm:     Rhythm: sinus rhythm    Ectopy:     Ectopy: none    QRS:     QRS axis:  Normal    QRS intervals:  Normal  Conduction:     Conduction: normal    ST segments:     ST segments:  Normal  T waves:     T waves: normal           Anderson Mujica MD  06/24/19 [id number]

## 2019-06-24 NOTE — ASSESSMENT & PLAN NOTE
Likely secondary to pre renal azotemia  Creatinine 1 48 on admission now 1 60  Increase rate IVF to 125cc/hr  Check PVR for completeness

## 2019-06-25 PROBLEM — D72.829 LEUKOCYTOSIS: Status: ACTIVE | Noted: 2019-06-25

## 2019-06-25 PROBLEM — D75.839 THROMBOCYTOSIS: Status: ACTIVE | Noted: 2019-06-25

## 2019-06-25 LAB
ALBUMIN SERPL BCP-MCNC: 2.8 G/DL (ref 3.5–5)
ALP SERPL-CCNC: 80 U/L (ref 46–116)
ALT SERPL W P-5'-P-CCNC: 27 U/L (ref 12–78)
ANION GAP SERPL CALCULATED.3IONS-SCNC: 9 MMOL/L (ref 4–13)
AST SERPL W P-5'-P-CCNC: 36 U/L (ref 5–45)
BASOPHILS # BLD AUTO: 0.11 THOUSANDS/ΜL (ref 0–0.1)
BASOPHILS NFR BLD AUTO: 1 % (ref 0–1)
BILIRUB SERPL-MCNC: 0.4 MG/DL (ref 0.2–1)
BUN SERPL-MCNC: 10 MG/DL (ref 5–25)
C DIFF TOX GENS STL QL NAA+PROBE: NORMAL
CALCIUM SERPL-MCNC: 8.3 MG/DL (ref 8.3–10.1)
CAMPYLOBACTER DNA SPEC NAA+PROBE: NORMAL
CHLORIDE SERPL-SCNC: 104 MMOL/L (ref 100–108)
CO2 SERPL-SCNC: 28 MMOL/L (ref 21–32)
CREAT SERPL-MCNC: 1.6 MG/DL (ref 0.6–1.3)
CRP SERPL QL: 25.8 MG/L
EOSINOPHIL # BLD AUTO: 0.58 THOUSAND/ΜL (ref 0–0.61)
EOSINOPHIL NFR BLD AUTO: 4 % (ref 0–6)
ERYTHROCYTE [DISTWIDTH] IN BLOOD BY AUTOMATED COUNT: 12.7 % (ref 11.6–15.1)
ERYTHROCYTE [SEDIMENTATION RATE] IN BLOOD: 6 MM/HOUR (ref 0–10)
GFR SERPL CREATININE-BSD FRML MDRD: 58 ML/MIN/1.73SQ M
GLUCOSE SERPL-MCNC: 71 MG/DL (ref 65–140)
HCT VFR BLD AUTO: 42.2 % (ref 36.5–49.3)
HGB BLD-MCNC: 13.7 G/DL (ref 12–17)
IMM GRANULOCYTES # BLD AUTO: 0.08 THOUSAND/UL (ref 0–0.2)
IMM GRANULOCYTES NFR BLD AUTO: 1 % (ref 0–2)
LYMPHOCYTES # BLD AUTO: 1.62 THOUSANDS/ΜL (ref 0.6–4.47)
LYMPHOCYTES NFR BLD AUTO: 11 % (ref 14–44)
MAGNESIUM SERPL-MCNC: 1.7 MG/DL (ref 1.6–2.6)
MCH RBC QN AUTO: 29.3 PG (ref 26.8–34.3)
MCHC RBC AUTO-ENTMCNC: 32.5 G/DL (ref 31.4–37.4)
MCV RBC AUTO: 90 FL (ref 82–98)
MONOCYTES # BLD AUTO: 1.05 THOUSAND/ΜL (ref 0.17–1.22)
MONOCYTES NFR BLD AUTO: 7 % (ref 4–12)
NEUTROPHILS # BLD AUTO: 10.76 THOUSANDS/ΜL (ref 1.85–7.62)
NEUTS SEG NFR BLD AUTO: 76 % (ref 43–75)
NRBC BLD AUTO-RTO: 0 /100 WBCS
PLATELET # BLD AUTO: 457 THOUSANDS/UL (ref 149–390)
PMV BLD AUTO: 10.1 FL (ref 8.9–12.7)
POTASSIUM SERPL-SCNC: 3.5 MMOL/L (ref 3.5–5.3)
PROT SERPL-MCNC: 6.7 G/DL (ref 6.4–8.2)
RBC # BLD AUTO: 4.68 MILLION/UL (ref 3.88–5.62)
SALMONELLA DNA SPEC QL NAA+PROBE: NORMAL
SHIGA TOXIN STX GENE SPEC NAA+PROBE: NORMAL
SHIGELLA DNA SPEC QL NAA+PROBE: NORMAL
SODIUM SERPL-SCNC: 141 MMOL/L (ref 136–145)
WBC # BLD AUTO: 14.2 THOUSAND/UL (ref 4.31–10.16)

## 2019-06-25 PROCEDURE — 80053 COMPREHEN METABOLIC PANEL: CPT | Performed by: FAMILY MEDICINE

## 2019-06-25 PROCEDURE — 86140 C-REACTIVE PROTEIN: CPT | Performed by: PHYSICIAN ASSISTANT

## 2019-06-25 PROCEDURE — 85652 RBC SED RATE AUTOMATED: CPT | Performed by: PHYSICIAN ASSISTANT

## 2019-06-25 PROCEDURE — 83735 ASSAY OF MAGNESIUM: CPT | Performed by: FAMILY MEDICINE

## 2019-06-25 PROCEDURE — 99254 IP/OBS CNSLTJ NEW/EST MOD 60: CPT | Performed by: PHYSICIAN ASSISTANT

## 2019-06-25 PROCEDURE — 99232 SBSQ HOSP IP/OBS MODERATE 35: CPT | Performed by: PHYSICIAN ASSISTANT

## 2019-06-25 PROCEDURE — 85025 COMPLETE CBC W/AUTO DIFF WBC: CPT | Performed by: FAMILY MEDICINE

## 2019-06-25 RX ORDER — TRAMADOL HYDROCHLORIDE 50 MG/1
50 TABLET ORAL EVERY 6 HOURS PRN
Status: DISCONTINUED | OUTPATIENT
Start: 2019-06-25 | End: 2019-06-28 | Stop reason: HOSPADM

## 2019-06-25 RX ORDER — ACETAMINOPHEN 325 MG/1
650 TABLET ORAL EVERY 6 HOURS PRN
Status: DISCONTINUED | OUTPATIENT
Start: 2019-06-25 | End: 2019-06-28 | Stop reason: HOSPADM

## 2019-06-25 RX ADMIN — POLYETHYLENE GLYCOL 3350, SODIUM SULFATE ANHYDROUS, SODIUM BICARBONATE, SODIUM CHLORIDE, POTASSIUM CHLORIDE 4000 ML: 236; 22.74; 6.74; 5.86; 2.97 POWDER, FOR SOLUTION ORAL at 18:02

## 2019-06-25 RX ADMIN — VANCOMYCIN HYDROCHLORIDE 125 MG: 500 INJECTION, POWDER, LYOPHILIZED, FOR SOLUTION INTRAVENOUS at 06:18

## 2019-06-25 RX ADMIN — PIPERACILLIN AND TAZOBACTAM 3.38 G: 3; .375 INJECTION, POWDER, FOR SOLUTION INTRAVENOUS at 17:54

## 2019-06-25 RX ADMIN — ONDANSETRON 4 MG: 2 INJECTION INTRAMUSCULAR; INTRAVENOUS at 23:30

## 2019-06-25 RX ADMIN — VANCOMYCIN HYDROCHLORIDE 125 MG: 500 INJECTION, POWDER, LYOPHILIZED, FOR SOLUTION INTRAVENOUS at 12:16

## 2019-06-25 RX ADMIN — ONDANSETRON 4 MG: 2 INJECTION INTRAMUSCULAR; INTRAVENOUS at 15:48

## 2019-06-25 RX ADMIN — PIPERACILLIN AND TAZOBACTAM 3.38 G: 3; .375 INJECTION, POWDER, FOR SOLUTION INTRAVENOUS at 12:16

## 2019-06-25 RX ADMIN — SODIUM CHLORIDE 125 ML/HR: 0.9 INJECTION, SOLUTION INTRAVENOUS at 17:54

## 2019-06-25 RX ADMIN — PIPERACILLIN AND TAZOBACTAM 3.38 G: 3; .375 INJECTION, POWDER, FOR SOLUTION INTRAVENOUS at 06:17

## 2019-06-25 RX ADMIN — TRAMADOL HYDROCHLORIDE 50 MG: 50 TABLET, COATED ORAL at 17:53

## 2019-06-25 NOTE — SOCIAL WORK
Cm met with the patient to evaluate the patients prior function and living situation and any barriers to d/c and form a safe d/c plan  Cm also evaluated the patient for any services in the home or needs for services  Pt resides at home with his parents in a house  Pt is independent with his adls and ambulation  No services or DME  PCP is Debbi Mendosa and pharmacy is [email or web address] in [place]  Pt has no insurance-PATH's to see pt re: medical assistance  Pt plans home on dc with outpatient folow up  Cm will follow and assist in dc planning

## 2019-06-25 NOTE — UTILIZATION REVIEW
Initial Clinical Review    Admission: Date/Time/Statement: 6/24/19 @ 1042 INPATIENT    Orders Placed This Encounter   Procedures    Inpatient Admission (expected length of stay for this patient Order details is greater than two midnights)     Standing Status:   Standing     Number of Occurrences:   1     Order Specific Question:   Admitting Physician     Answer:   Olivia Pleitez     Order Specific Question:   Level of Care     Answer:   Med Surg [16]     Order Specific Question:   Estimated length of stay     Answer:   More than 2 Midnights     Order Specific Question:   Certification     Answer:   I certify that inpatient services are medically necessary for this patient for a duration of greater than two midnights  See H&P and MD Progress Notes for additional information about the patient's course of treatment  ED Arrival Information     Expected Arrival Acuity Means of Arrival Escorted By Service Admission Type    - 6/24/2019 05:48 Urgent Walk-In Family Member General Medicine Urgent    Arrival Complaint    -        Chief Complaint   Patient presents with    Abdominal Pain     Patient has been having generalized weakness and and abdominal pain  Assessment/Plan: 33 y/o male presents to ED from home with nausea, abdominal pain, diarrhea for past 6-8 weeks  Seen in [facility]gent [facility] several days ago and discharged with diagnosis of diarrhea, prescribed Bentyl  On Cipro and Flagyl since 6/20  Pt reports brown/yellow diarrhea with some blood in stool  Also reports dark urine  Negative stool enteric panel, O&P from 6/20  Abdominal tenderness on exam   Admitted as inpatient due to pancolitis, OMA  Check stool Cdiff  PO vanco   Check stool enteric panel, fecal calprotectin, O&P, leuks, heme test   Continue IV zosyn  Consult GI   NPO  Continue IVF  PRN antiemetics  Repeat labs  6/25 Leukocytosis with bandemia, thrombocytosis  Suspect reactive 2/2 pancolitis  Cr increased today    Increase IVF rate to 125/hr  Check PVR  Pt with bm x4 today  Poor appetite  Continues with cramping abd pain  6/25 GI consult:  Differential diagnosis includes inflammatory bowel disease vs infectious colitis  Colonoscopy scheduled for 6/26      ED Triage Vitals [06/24/19 0555]   Temperature Pulse Respirations Blood Pressure SpO2   98 1 °F (36 7 °C) 94 18 140/78 99 %      Temp Source Heart Rate Source Patient Position - Orthostatic VS BP Location FiO2 (%)   Temporal Monitor Lying Left arm --      Pain Score       Worst Possible Pain        Wt Readings from Last 1 Encounters:   06/25/19 95 3 kg (210 lb)     Additional Vital Signs:   06/25/19 0700 97 6 °F (36 4 °C) 84 18 135/77 99 % None (Room air)   06/24/19 2337 98 2 °F (36 8 °C) 77 18 131/64 99 % None (Room air)   06/24/19 1513 97 4 °F (36 3 °C)Abnormal  71 16 118/62 96 % None (Room air)   06/24/19 1030  85 18 142/72 94 % None (Room air)   06/24/19 0945  86 13 136/70 95 % None (Room air)   06/24/19 0930  88 16 126/70 97 % None (Room air)   06/24/19 0915  93 17 131/70 97 % None (Room air)   06/24/19 0900  94 16 163/84 96 % None (Room air)   06/24/19 0845  85 15 136/75 98 % None (Room air)   06/24/19 0830  86 15 136/77 99 % None (Room air)   06/24/19 0815  93 19 136/72 98 % None (Room air)   06/24/19 0745  94 13 131/67 96 % None (Room air)   06/24/19 0730  86 15 127/66 99 % None (Room air)   06/24/19 0715  93 14 129/64 99 %    06/24/19 0700  92 18 131/64 97 % None (Room air)   06/24/19 0630  90 14 129/60 100 %        Pertinent Labs/Diagnostic Test Results:   Lab Units 06/25/19  0620 06/24/19  0608   WBC Thousand/uL 14 20* 15 29*   HEMOGLOBIN g/dL 13 7 16 3   HEMATOCRIT % 42 2 49 9*   PLATELETS Thousands/uL 457* 531*   NEUTROS ABS Thousands/µL 10 76* 12 25*     Lab Units 06/25/19  0620 06/24/19  0608   SODIUM mmol/L 141 137   POTASSIUM mmol/L 3 5 3 6   CHLORIDE mmol/L 104 100   CO2 mmol/L 28 30   ANION GAP mmol/L 9 7   BUN mg/dL 10 9   CREATININE mg/dL 1 60* 1 48*   EGFR ml/min/1 73sq m 58 64   CALCIUM mg/dL 8 3 9 4   MAGNESIUM mg/dL 1 7 1 9     Lab Units 06/25/19  0620 06/24/19  0608   AST U/L 36 26   ALT U/L 27 25   ALK PHOS U/L 80 109   TOTAL PROTEIN g/dL 6 7 8 5*   ALBUMIN g/dL 2 8* 3 6   TOTAL BILIRUBIN mg/dL 0 40 0 50     Lab Units 06/25/19  0620 06/24/19  0608   GLUCOSE RANDOM mg/dL 71 100     Results from last 7 days   Lab Units 06/24/19  0608   CK TOTAL U/L 67     Results from last 7 days   Lab Units 06/24/19  0713   TROPONIN I ng/mL <0 02     Results from last 7 days   Lab Units 06/24/19  0608   PROTIME seconds 14 7*   INR  1 21*   PTT seconds 37     Results from last 7 days   Lab Units 06/24/19  1138 06/24/19  0647   PROCALCITONIN ng/ml 0 08 0 13     Results from last 7 days   Lab Units 06/24/19  0608   LACTIC ACID mmol/L 1 5     Lab Units 06/24/19  0608   LIPASE u/L 60*     Results from last 7 days   Lab Units 06/25/19  0620   SED RATE mm/hour 6     Results from last 7 days   Lab Units 06/24/19  1022   CLARITY UA  Clear   COLOR UA  Yellow   SPEC GRAV UA  1 020   PH UA  5 5   GLUCOSE UA mg/dl Negative   KETONES UA mg/dl 15 (1+)*   BLOOD UA  Negative   PROTEIN UA mg/dl Negative   NITRITE UA  Negative   BILIRUBIN UA  Negative   UROBILINOGEN UA E U /dl 0 2   LEUKOCYTES UA  Trace*   WBC UA /hpf 2-4*   RBC UA /hpf None Seen   BACTERIA UA /hpf Occasional   EPITHELIAL CELLS WET PREP /hpf Occasional     Results from last 7 days   Lab Units 06/24/19 2002   C DIFF TOXIN B  NEGATIVE for C difficle toxin by PCR  Lab Units 06/24/19 2000   SALMONELLA SP PCR  None Detected   SHIGELLA SP/ENTEROINVASIVE E  COLI (EIEC)  None Detected   CAMPYLOBACTER SP (JEJUNI AND COLI)  None Detected   SHIGA TOXIN 1/SHIGA TOXIN 2  None Detected     Results from last 7 days   Lab Units 06/24/19  0647 06/24/19  0644   BLOOD CULTURE  No Growth at 24 hrs  No Growth at 24 hrs      6/24 EKG:  Normal sinus rhythm  6/24 CXR:  No acute cardiopulmonary disease  6/24 CT abd/pelvis:  1   Pancolitis again demonstrated with wall thickening and adjacent fat stranding predominantly involving the transverse, left and rectosigmoid colon   This appears slightly improved from the prior exam   Continued follow-up to resolution suggested      ED Treatment:   Medication Administration from 06/24/2019 0548 to 06/24/2019 1110       Date/Time Order Dose Route Action     06/24/2019 0614 sodium chloride 0 9 % bolus 1,000 mL 1,000 mL Intravenous New Bag     06/24/2019 0614 HYDROmorphone (DILAUDID) injection 1 mg 0 5 mg Intravenous Given     06/24/2019 0614 ondansetron (ZOFRAN) injection 4 mg 4 mg Intravenous Given     06/24/2019 0626 LORazepam (ATIVAN) 2 mg/mL injection 1 mg 1 mg Intravenous Given     06/24/2019 0648 lactated ringers bolus 1,000 mL 1,000 mL Intravenous New Bag     06/24/2019 0722 lactated ringers bolus 1,000 mL 1,000 mL Intravenous New Bag     06/24/2019 0754 lactated ringers bolus 1,000 mL 1,000 mL Intravenous New Bag     06/24/2019 0649 piperacillin-tazobactam (ZOSYN) 4 5 g in sodium chloride 0 9 % 100 mL IVPB 4,500 mg of piperacillin Intravenous New Bag     06/24/2019 0812 iohexol (OMNIPAQUE) 240 MG/ML solution 50 mL 50 mL Oral Given     06/24/2019 0905 ondansetron (ZOFRAN) injection 4 mg 4 mg Intravenous Given     06/24/2019 1002 iohexol (OMNIPAQUE) 350 MG/ML injection (MULTI-DOSE) 100 mL 100 mL Intravenous Given     06/24/2019 1100 promethazine (PHENERGAN) injection 25 mg 25 mg Intravenous Given        Past Medical History:   Diagnosis Date    Rhabdomyolysis 2011       Admitting Diagnosis: Pancolitis ([facility] [address] ) [K51 00]  Abdominal pain [R10 9]  Age/Sex: 34 y o  male  Admission Orders:    Current Facility-Administered Medications:  ondansetron 4 mg Intravenous Q4H PRN   piperacillin-tazobactam 3 375 g Intravenous Q6H   sodium chloride 125 mL/hr Intravenous Continuous       IP CONSULT TO GASTROENTEROLOGY  IP CONSULT TO CASE MANAGEMENT   Daily weight  VS  I/O  Clear liquid diet, NPO after MN  Colonoscopy    Network Utilization Review Department  Phone: [phone]; Fax [phone]  [email or web address]  ATTENTION: Please call with any questions or concerns to [phone]  and carefully listen to the prompts so that you are directed to the right person  Send all requests for admission clinical reviews, approved or denied determinations and any other requests to fax [phone]   All voicemails are confidential

## 2019-06-25 NOTE — PROGRESS NOTES
Progress Note - Aster Music 1990, 34 y o  male MRN: [id number]  Unit/Bed#: 423-01 Encounter: [id number] DOS: 6/25/19  Primary Care Provider: Cedric Su DO   Date and time admitted to hospital: 6/24/2019  5:52 AM    * Pancolitis [facility])  Assessment & Plan  Symptoms ongoing for past 6-8 weeks, associated with 30lb weight loss, cramping abdominal pain, and poor oral intake  No hematochezia  Notes family hx of IBD  Treated with bentyl, cipro and flagyl with no improvement in outpatient setting  Check for C diff and empirically treat with PO vanco until results are available  Check Enteric Panel, fecal Calprotectin, O&P, Leucocytes  Heme Test all stool  Empirically treat with IV Zosyn   Consult Gastroenterology  NPO Status for now   IVF, antiemetics    Leukocytosis  Assessment & Plan  With bandemia, suspect reactive 2/2 above  Afebrile   Monitor     Thrombocytosis (HCC)  Assessment & Plan  Suspect reactive 2/2 above     OMA (acute kidney injury) ([facility] [address] )  Assessment & Plan  Likely secondary to pre renal azotemia  Creatinine 1 48 on admission now 1 60  Increase rate IVF to 125cc/hr  Check PVR for completeness      VTE Pharmacologic Prophylaxis:   Pharmacologic: Pharmacologic VTE Prophylaxis contraindicated due to low risk   Mechanical VTE Prophylaxis in Place: Yes    Patient Centered Rounds: I have performed bedside rounds with nursing staff today  Discussions with Specialists or Other Care Team Provider: nursing     Education and Discussions with Family / Patient: patient     Time Spent for Care: 30 minutes  More than 50% of total time spent on counseling and coordination of care as described above      Current Length of Stay: 1 day(s)    Current Patient Status: Inpatient   Certification Statement: The patient will continue to require additional inpatient hospital stay due to ongoing IVF hydration, pending stool studies and GI workup     Discharge Plan / Estimated Discharge Date: pending clinical course     Code Status: Level 1 - Full Code    Subjective:   Pt seen and examined at bedside  Had 4 BM today so far  No n/v but has poor appetite  Notes cramping pain prior to BM  Objective:     Vitals:   Temp (24hrs), Av 7 °F (36 5 °C), Min:97 4 °F (36 3 °C), Max:98 2 °F (36 8 °C)    Temp:  [97 4 °F (36 3 °C)-98 2 °F (36 8 °C)] 97 6 °F (36 4 °C)  HR:  [71-94] 84  Resp:  [13-18] 18  BP: (118-163)/(62-84) 135/77  SpO2:  [94 %-99 %] 99 %  Body mass index is 27 71 kg/m²  Input and Output Summary (last 24 hours): Intake/Output Summary (Last 24 hours) at 2019 0818  Last data filed at 2019 0810  Gross per 24 hour   Intake 1955 ml   Output 1550 ml   Net 405 ml     Physical Exam:     Physical Exam   Constitutional: He is oriented to person, place, and time  He appears well-developed and well-nourished  HENT:   Head: Normocephalic and atraumatic  Eyes: EOM are normal  No scleral icterus  Neck: Normal range of motion  Neck supple  Cardiovascular: Normal rate, regular rhythm and normal heart sounds  No murmur heard  Pulmonary/Chest: Effort normal and breath sounds normal    Abdominal: Soft  Bowel sounds are normal  He exhibits no distension  There is no tenderness  Musculoskeletal: Normal range of motion  He exhibits no edema  Neurological: He is alert and oriented to person, place, and time  Skin: Skin is warm and dry  There is pallor  Psychiatric: He has a normal mood and affect       Additional Data:     Labs:    Results from last 7 days   Lab Units 19  0620   WBC Thousand/uL 14 20*   HEMOGLOBIN g/dL 13 7   HEMATOCRIT % 42 2   PLATELETS Thousands/uL 457*   NEUTROS PCT % 76*   LYMPHS PCT % 11*   MONOS PCT % 7   EOS PCT % 4     Results from last 7 days   Lab Units 19  0620   POTASSIUM mmol/L 3 5   CHLORIDE mmol/L 104   CO2 mmol/L 28   BUN mg/dL 10   CREATININE mg/dL 1 60*   CALCIUM mg/dL 8 3   ALK PHOS U/L 80   ALT U/L 27   AST U/L 36     Results from last 7 days   Lab Units 06/24/19  0608   INR  1 21*       * I Have Reviewed All Lab Data Listed Above  * Additional Pertinent Lab Tests Reviewed: [place] [address] Admission Reviewed    Imaging:    Imaging Reports Reviewed Today Include: aLL  Imaging Personally Reviewed by Myself Includes:  none    Recent Cultures (last 7 days):           Last 24 Hours Medication List:     Current Facility-Administered Medications:  ondansetron 4 mg Intravenous Q4H PRN Alonzo Corbett MD    piperacillin-tazobactam 3 375 g Intravenous Joycelyn Mcdonald MD Last Rate: 3 375 g (06/25/19 0617)   sodium chloride 125 mL/hr Intravenous Continuous Ahsan Garvin PA-C Last Rate: 125 mL/hr (06/25/19 0810)   vancomycin 125 mg Oral Q6H Gregoria Mercer MD         Today, Patient Was Seen By: Regina Yepez PA-C    ** Please Note: This note has been constructed using a voice recognition system   **

## 2019-06-25 NOTE — PHYSICIAN ADVISOR
Current patient class: Inpatient  The patient is currently on Hospital Day: 2 at [address]       The patient is  documented to require at least a 2nd midnight in the hospital  As such the patient is  expected to satisfy the 2 midnight benchmark and is therefore eligible for inpatient admission  After review of the relevant documentation, labs, vital signs and test results, the patient is appropriate for INPATIENT ADMISSION  Admission to the hospital as an inpatient is a complex decision making process which requires the practitioner to consider the patients presenting complaint, history and physical examination and all relevant testing  With this in mind, in this case, the patient was deemed appropriate for INPATIENT ADMISSION  After review of the documentation and testing available at the time of the admission I concur with this clinical determination of medical necessity  Rationale is as follows: The patient is a 34 yrs Male who presented to the ED at 6/24/2019  5:52 AM with a chief complaint of Abdominal Pain (Patient has been having generalized weakness and and abdominal pain  )  Patient has had diarrhea and abdominal pain for the past 8 weeks, he has had a 30 lb weight loss during this time frame-he was seen in the ER on the 20th and treated with antibiotics without improvement in his symptoms   labs showed leukocytosis and acute kidney injury  Underwent CT abdomen which showed pan colitis  He was started on IV fluids with serial monitoring of renal function  He was evaluated by GI service and colonoscopy is recommended for further evaluation  He will be NPO after midnight tonight for colonoscopy tomorrow    He is therefore appropriate for inpatient admission    The patients vitals on arrival were ED Triage Vitals [06/24/19 0555]   Temperature Pulse Respirations Blood Pressure SpO2   98 1 °F (36 7 °C) 94 18 140/78 99 %      Temp Source Heart Rate Source Patient Position - Orthostatic VS BP Location FiO2 (%)   Temporal Monitor Lying Left arm --      Pain Score       Worst Possible Pain           Past Medical History:   Diagnosis Date    Rhabdomyolysis 2011     History reviewed  No pertinent surgical history      The patient was admitted to the hospital at [phone] on 6/24/19 for the following diagnosis:  Pancolitis ([facility] [address] ) [K51 00]  Abdominal pain [R10 9]    Consults have been placed to:   IP CONSULT TO GASTROENTEROLOGY  IP CONSULT TO CASE MANAGEMENT    Vitals:    06/24/19 2337 06/25/19 0600 06/25/19 0700 06/25/19 1554   BP: 131/64  135/77 138/78   BP Location: Left arm  Left arm Left arm   Pulse: 77  84 88   Resp: 18  18 16   Temp: 98 2 °F (36 8 °C)  97 6 °F (36 4 °C) 98 1 °F (36 7 °C)   TempSrc: Temporal  Temporal Temporal   SpO2: 99%  99% 98%   Weight:  95 3 kg (210 lb)     Height:           Most recent labs:    Recent Labs     06/24/19  0608 06/24/19  0713 06/25/19  0620   WBC 15 29*  --  14 20*   HGB 16 3  --  13 7   HCT 49 9*  --  42 2   *  --  457*   K 3 6  --  3 5   CALCIUM 9 4  --  8 3   BUN 9  --  10   CREATININE 1 48*  --  1 60*   LIPASE 60*  --   --    INR 1 21*  --   --    TROPONINI  --  <0 02  --    CKTOTAL 67  --   --    AST 26  --  36   ALT 25  --  27   ALKPHOS 109  --  80       Scheduled Meds:  Current Facility-Administered Medications:  acetaminophen 650 mg Oral Q6H PRN Ahsan Garvin PA-C    ondansetron 4 mg Intravenous Q4H PRN Alonzo Corbett MD    piperacillin-tazobactam 3 375 g Intravenous Q6H Alonzo Corbett MD Last Rate: 3 375 g (06/25/19 1216)   polyethylene glycol 4,000 mL Oral Once July Thorpe PA-C    sodium chloride 125 mL/hr Intravenous Continuous Ahsan Garvin PA-C Last Rate: 125 mL/hr (06/25/19 0810)   traMADol 50 mg Oral Q6H PRN Ahsan Garvin PA-C      Continuous Infusions:  sodium chloride 125 mL/hr Last Rate: 125 mL/hr (06/25/19 0810)     PRN Meds:   acetaminophen    ondansetron    traMADol    Surgical procedures (if appropriate):

## 2019-06-25 NOTE — PLAN OF CARE
Problem: GASTROINTESTINAL - ADULT  Goal: Minimal or absence of nausea and/or vomiting  Description  INTERVENTIONS:  - Administer IV fluids as ordered to ensure adequate hydration  - Maintain NPO status until nausea and vomiting are resolved  - Nasogastric tube as ordered  - Administer ordered antiemetic medications as needed  - Provide nonpharmacologic comfort measures as appropriate  - Advance diet as tolerated, if ordered  - Nutrition services referral to assist patient with adequate nutrition and appropriate food choices  Outcome: Progressing  Goal: Maintains adequate nutritional intake  Description  INTERVENTIONS:  - Monitor percentage of each meal consumed  - Identify factors contributing to decreased intake, treat as appropriate  - Assist with meals as needed  - Monitor I&O, WT and lab values  - Obtain nutrition services referral as needed  Outcome: Progressing     Problem: PAIN - ADULT  Goal: Verbalizes/displays adequate comfort level or baseline comfort level  Description  Interventions:  - Encourage patient to monitor pain and request assistance  - Assess pain using appropriate pain scale  - Administer analgesics based on type and severity of pain and evaluate response  - Implement non-pharmacological measures as appropriate and evaluate response  - Consider cultural and social influences on pain and pain management  - Notify physician/advanced practitioner if interventions unsuccessful or patient reports new pain  Outcome: Progressing     Problem: INFECTION - ADULT  Goal: Absence or prevention of progression during hospitalization  Description  INTERVENTIONS:  - Assess and monitor for signs and symptoms of infection  - Monitor lab/diagnostic results  - Monitor all insertion sites  - [place] appropriate cooling/warming therapies per order  - Administer medications as ordered  - Instruct and encourage patient and family to use good hand hygiene technique  - Identify and instruct in appropriate isolation precautions for identified infection/condition   Outcome: Progressing     Problem: Knowledge Deficit  Goal: Patient/family/caregiver demonstrates understanding of disease process, treatment plan, medications, and discharge instructions  Description  Complete learning assessment and assess knowledge base  Interventions:  - Provide teaching at level of understanding  - Provide teaching via preferred learning methods  Outcome: Progressing     Problem: Nutrition/Hydration-ADULT  Goal: Nutrient/Hydration intake appropriate for improving, restoring or maintaining nutritional needs  Description  Monitor and assess patient's nutrition/hydration status for malnutrition (ex- brittle hair, bruises, dry skin, pale skin and conjunctiva, muscle wasting, smooth red tongue, and disorientation)  Collaborate with interdisciplinary team and initiate plan and interventions as ordered  Monitor patient's weight and dietary intake as ordered or per policy  Utilize nutrition screening tool and intervene per policy  Determine patient's food preferences and provide high-protein, high-caloric foods as appropriate       INTERVENTIONS:  - Monitor oral intake, urinary output, labs, and treatment plans  - Assess nutrition and hydration status and recommend course of action  - Evaluate amount of meals eaten  - Assist patient with eating if necessary   - Allow adequate time for meals  - Recommend/ encourage appropriate diets, oral nutritional supplements, and vitamin/mineral supplements  - Order, calculate, and assess calorie counts as needed  - Recommend, monitor, and adjust tube feedings and TPN/PPN based on assessed needs  - Assess need for intravenous fluids  - Provide specific nutrition/hydration education as appropriate  - Include patient/family/caregiver in decisions related to nutrition  Outcome: Progressing     Problem: Potential for Falls  Goal: Patient will remain free of falls  Description  INTERVENTIONS:  - Assess patient frequently for physical needs  -  Identify cognitive and physical deficits and behaviors that affect risk of falls    -  [place] fall precautions as indicated by assessment   - Educate patient/family on patient safety including physical limitations  - Instruct patient to call for assistance with activity based on assessment  - Modify environment to reduce risk of injury  - Consider OT/PT consult to assist with strengthening/mobility  Outcome: Progressing

## 2019-06-25 NOTE — CONSULTS
Consultation - [facility]) [facility]  Antonio Guthrie 34 y o  male MRN: [id number]  Unit/Bed#: 423-01 Encounter: [id number]        ASSESSMENT/PLAN:   34y o  year old male without significant past medical history who presents to the hospital for abdominal pain, diarrhea, nausea and vomiting  1  Abdominal pain  2  Diarrhea  3  Nausea and vomiting  4  Unintended weight loss  5  Rectal pain  6  Rectal bleeding   -he states his symptoms 1st began about 2 months ago and have been gradually worsening, initially had abdominal pain and flatulence and then he had loose stool which became diarrhea with up to 20 bowel movements per day  He then began to have nausea and vomiting on Friday and Saturday and he believes he has had about a 30 lb weight loss as a result of his symptoms  He also has rectal pain associated with the bowel movement he has seen some bright red blood per rectum especially with straining, he denies any rectal bleeding since admission  -he had a CT of the abdomen and pelvis with contrast on 6/20 as well as 6/24 demonstrating pan colitis predominantly involving the transverse, left and rectosigmoid colon  -he was seen in the ER on the 20th and was treated with Cipro and Flagyl without improvement in his symptoms   -his hemoglobin is within normal limits at 13 7, he does have an elevated platelet count at 949 and leukocytosis, initially 16k improved to 14k today    -stool enteric bacterial panel and C diff stool test were both negative, fecal calprotectin, CRP and sed rate are pending  His albumin is low at 2 8    -differential diagnosis includes inflammatory bowel disease verses infectious colitis   -recommend a colonoscopy for further evaluation, discussed the risks and benefits with him today risks including but not limited to bleeding, perforation, infection and missed lesions    He is agreeable and all of his questions regarding the procedure were answered    -clear liquid diet today, bowel prep this afternoon/evening and NPO after midnight for colonoscopy tomorrow    -Plan was discussed with Dr Jocelin Saldaña and Mac Chase (PA with [facility])  Inpatient consult to gastroenterology  Consult performed by: Keyana Johns PA-C  Consult ordered by: Gabbie David MD          Reason for Consult / Principal Problem: Pancolitis [facility])    HPI: Antonio Guthrie is a 34y o  year old male without significant past medical history who presents to the hospital for abdominal pain, diarrhea, nausea and vomiting  He reports that his symptoms 1st started in April with mild abdominal pain and flatulence, a couple weeks later he began having loose stool which then became diarrhea with up to 20 bowel movements per day  He states his bowel movements are watery  He then had nausea and vomiting on Friday and Saturday of this week  He thinks he may have had a fever at some point over the past 2 months but does not believe he has had a fever recently  He states that he began to see some bright red blood per rectum especially when he strains for a bowel movement  He states that he has rectal spasms/pain after a bowel movement  He believes he has had about a 30 lb weight loss as a result of his symptoms  He denies any GI complaints prior to 2 months ago  He initially presented to Urgent Care on 06/18/2019 and was treated with Bentyl, he then presented to the hospital on the 20th and was evaluated in the ER and was treated with Cipro and Flagyl for colitis noted on CT  He returned to the hospital yesterday and again mesa colitis was seen on CT  He believes his grandmother may have had Crohn's disease but he sure  He denies any other family history of IBD  He states that his mother's side of the family has stomach issues     Review of Systems: as per HPI  Review of Systems   Constitutional: Positive for appetite change, fever and unexpected weight change   Negative for activity change, chills and fatigue  HENT: Negative for mouth sores, sore throat and trouble swallowing  Respiratory: Negative for shortness of breath  Cardiovascular: Negative for chest pain  Gastrointestinal: Positive for abdominal pain, blood in stool, diarrhea, nausea and vomiting  Negative for abdominal distention and constipation  Skin: Negative for color change, pallor, rash and wound  Neurological: Negative for tremors and syncope  All other systems reviewed and are negative  Historical Information   Past Medical History:   Diagnosis Date    Rhabdomyolysis 2011     History reviewed  No pertinent surgical history  Social History   Social History     Substance and Sexual Activity   Alcohol Use Not Currently    Frequency: Monthly or less    Drinks per session: 1 or 2     Social History     Substance and Sexual Activity   Drug Use Never     Social History     Tobacco Use   Smoking Status Never Smoker   Smokeless Tobacco Never Used     Family History   Problem Relation Age of Onset    No Known Problems Mother     No Known Problems Father        Meds/Allergies     Medications Prior to Admission   Medication    ciprofloxacin (CIPRO) 500 mg tablet    metroNIDAZOLE (FLAGYL) 500 mg tablet    dicyclomine (BENTYL) 20 mg tablet     Current Facility-Administered Medications   Medication Dose Route Frequency    ondansetron (ZOFRAN) injection 4 mg  4 mg Intravenous Q4H PRN    piperacillin-tazobactam (ZOSYN) 3 375 g in sodium chloride 0 9 % 50 mL IVPB  3 375 g Intravenous Q6H    sodium chloride 0 9 % infusion  125 mL/hr Intravenous Continuous    vancomycin (VANCOCIN) oral solution 125 mg  125 mg Oral Q6H [address]       Allergies   Allergen Reactions    Dilaudid [Hydromorphone]      Chest pain/pressure       Objective     Blood pressure 135/77, pulse 84, temperature 97 6 °F (36 4 °C), temperature source Temporal, resp  rate 18, height 6' 1" (1 854 m), weight 95 3 kg (210 lb), SpO2 99 %        Intake/Output Summary (Last 24 hours) at 6/25/2019 1046  Last data filed at 6/25/2019 0810  Gross per 24 hour   Intake 1955 ml   Output 1550 ml   Net 405 ml       PHYSICAL EXAM     Physical Exam   Constitutional: He is oriented to person, place, and time  He appears well-developed and well-nourished  No distress  HENT:   Head: Normocephalic and atraumatic  Eyes: Right eye exhibits no discharge  Left eye exhibits no discharge  No scleral icterus  Neck: Neck supple  No tracheal deviation present  Cardiovascular: Normal rate, regular rhythm, normal heart sounds and intact distal pulses  Exam reveals no gallop and no friction rub  No murmur heard  Pulmonary/Chest: Effort normal and breath sounds normal  No respiratory distress  He has no wheezes  He has no rales  He exhibits no tenderness  Abdominal: Soft  Bowel sounds are normal  He exhibits no distension and no mass  There is no tenderness  There is no rebound and no guarding  Neurological: He is alert and oriented to person, place, and time  Skin: Skin is warm and dry  Psychiatric: He has a normal mood and affect  Lab Results:   CBC:   Lab Results   Component Value Date    WBC 14 20 (H) 06/25/2019    HGB 13 7 06/25/2019    HCT 42 2 06/25/2019    MCV 90 06/25/2019     (H) 06/25/2019    MCH 29 3 06/25/2019    MCHC 32 5 06/25/2019    RDW 12 7 06/25/2019    MPV 10 1 06/25/2019    NRBC 0 06/25/2019   ,   CMP:   Lab Results   Component Value Date    K 3 5 06/25/2019     06/25/2019    CO2 28 06/25/2019    BUN 10 06/25/2019    CREATININE 1 60 (H) 06/25/2019    CALCIUM 8 3 06/25/2019    AST 36 06/25/2019    ALT 27 06/25/2019    ALKPHOS 80 06/25/2019    EGFR 58 06/25/2019   ,   Imaging Studies: I have personally reviewed pertinent reports  CT ABDOMEN AND PELVIS WITH IV CONTRAST     INDICATION:   Abd pain, gastroenteritis or colitis suspected      COMPARISON:  CT 6/20/2019     TECHNIQUE:  CT examination of the abdomen and pelvis was performed   Axial, sagittal, and coronal 2D reformatted images were created from the source data and submitted for interpretation      Radiation dose length product (DLP) for this visit:  529 56 mGy-cm   This examination, like all CT scans performed in the [facility], was performed utilizing techniques to minimize radiation dose exposure, including the use of iterative   reconstruction and automated exposure control      IV Contrast:  50 mL of iohexol (OMNIPAQUE 240) 100 mL of iohexol (OMNIPAQUE)  Enteric Contrast: Oral 50 mL of iohexol (OMNIPAQUE 240)     FINDINGS:     ABDOMEN     LOWER CHEST:  No clinically significant abnormality identified in the visualized lower chest   Small right middle scarring/atelectasis      LIVER/BILIARY TREE:  Unremarkable      GALLBLADDER:  No calcified gallstones  No pericholecystic inflammatory change      SPLEEN:  Unremarkable      PANCREAS:  Unremarkable      ADRENAL GLANDS:  Unremarkable      KIDNEYS/URETERS:  Unremarkable  No hydronephrosis      STOMACH AND BOWEL:  Pancolitis again demonstrated with wall thickening and adjacent fat stranding predominantly involving the transverse, left and rectosigmoid colon  This appears slightly improved from the prior exam      APPENDIX:  No findings to suggest appendicitis      ABDOMINOPELVIC CAVITY:  No free intraperitoneal air  Shotty mesenteric nodes may be reactive  Minimal free fluid but decreased from the prior exam      VESSELS:  Unremarkable for patient's age      PELVIS     REPRODUCTIVE ORGANS:  Unremarkable for patient's age      URINARY BLADDER:  Unremarkable      ABDOMINAL WALL/INGUINAL REGIONS:  Unremarkable      OSSEOUS STRUCTURES:  No acute fracture or destructive osseous lesion  L5 spondylolysis without significant spondylolisthesis      IMPRESSION:  1  Pancolitis again demonstrated with wall thickening and adjacent fat stranding predominantly involving the transverse, left and rectosigmoid colon    This appears slightly improved from the prior exam   Continued follow-up to resolution suggested             Patient was seen and examined by Dr Valentino Stanford  All key medical decisions were made by Dr Valentino Stanford  Thank you for allowing us to participate in the care of this present patient  We will follow-up with you closely

## 2019-06-26 ENCOUNTER — ANESTHESIA EVENT (INPATIENT)
Dept: PERIOP | Facility: HOSPITAL | Age: 29
DRG: 386 | End: 2019-06-26

## 2019-06-26 ENCOUNTER — APPOINTMENT (INPATIENT)
Dept: PERIOP | Facility: HOSPITAL | Age: 29
DRG: 386 | End: 2019-06-26
Attending: INTERNAL MEDICINE

## 2019-06-26 ENCOUNTER — ANESTHESIA (INPATIENT)
Dept: PERIOP | Facility: HOSPITAL | Age: 29
DRG: 386 | End: 2019-06-26

## 2019-06-26 PROBLEM — R65.10 SIRS (SYSTEMIC INFLAMMATORY RESPONSE SYNDROME) (HCC): Status: ACTIVE | Noted: 2019-06-26

## 2019-06-26 PROBLEM — K51.90 ULCERATIVE COLITIS (HCC): Status: ACTIVE | Noted: 2019-06-24

## 2019-06-26 LAB
ALBUMIN SERPL BCP-MCNC: 2.8 G/DL (ref 3.5–5)
ALP SERPL-CCNC: 67 U/L (ref 46–116)
ALT SERPL W P-5'-P-CCNC: 28 U/L (ref 12–78)
ANION GAP SERPL CALCULATED.3IONS-SCNC: 5 MMOL/L (ref 4–13)
AST SERPL W P-5'-P-CCNC: 28 U/L (ref 5–45)
BASOPHILS # BLD AUTO: 0.05 THOUSANDS/ΜL (ref 0–0.1)
BASOPHILS NFR BLD AUTO: 1 % (ref 0–1)
BILIRUB SERPL-MCNC: 0.3 MG/DL (ref 0.2–1)
BUN SERPL-MCNC: 6 MG/DL (ref 5–25)
CALCIUM SERPL-MCNC: 8.3 MG/DL (ref 8.3–10.1)
CHLORIDE SERPL-SCNC: 105 MMOL/L (ref 100–108)
CO2 SERPL-SCNC: 30 MMOL/L (ref 21–32)
CREAT SERPL-MCNC: 1.62 MG/DL (ref 0.6–1.3)
CRP SERPL QL: 34.6 MG/L
EOSINOPHIL # BLD AUTO: 0.75 THOUSAND/ΜL (ref 0–0.61)
EOSINOPHIL NFR BLD AUTO: 10 % (ref 0–6)
ERYTHROCYTE [DISTWIDTH] IN BLOOD BY AUTOMATED COUNT: 12.5 % (ref 11.6–15.1)
GFR SERPL CREATININE-BSD FRML MDRD: 57 ML/MIN/1.73SQ M
GLUCOSE SERPL-MCNC: 83 MG/DL (ref 65–140)
HCT VFR BLD AUTO: 39.7 % (ref 36.5–49.3)
HGB BLD-MCNC: 12.8 G/DL (ref 12–17)
IMM GRANULOCYTES # BLD AUTO: 0.04 THOUSAND/UL (ref 0–0.2)
IMM GRANULOCYTES NFR BLD AUTO: 1 % (ref 0–2)
LYMPHOCYTES # BLD AUTO: 1.61 THOUSANDS/ΜL (ref 0.6–4.47)
LYMPHOCYTES NFR BLD AUTO: 21 % (ref 14–44)
MAGNESIUM SERPL-MCNC: 1.7 MG/DL (ref 1.6–2.6)
MCH RBC QN AUTO: 29 PG (ref 26.8–34.3)
MCHC RBC AUTO-ENTMCNC: 32.2 G/DL (ref 31.4–37.4)
MCV RBC AUTO: 90 FL (ref 82–98)
MONOCYTES # BLD AUTO: 0.78 THOUSAND/ΜL (ref 0.17–1.22)
MONOCYTES NFR BLD AUTO: 10 % (ref 4–12)
NEUTROPHILS # BLD AUTO: 4.31 THOUSANDS/ΜL (ref 1.85–7.62)
NEUTS SEG NFR BLD AUTO: 57 % (ref 43–75)
NRBC BLD AUTO-RTO: 0 /100 WBCS
PHOSPHATE SERPL-MCNC: 3 MG/DL (ref 2.7–4.5)
PLATELET # BLD AUTO: 400 THOUSANDS/UL (ref 149–390)
PMV BLD AUTO: 9.4 FL (ref 8.9–12.7)
POTASSIUM SERPL-SCNC: 3.6 MMOL/L (ref 3.5–5.3)
PROCALCITONIN SERPL-MCNC: 0.11 NG/ML
PROT SERPL-MCNC: 6.7 G/DL (ref 6.4–8.2)
RBC # BLD AUTO: 4.41 MILLION/UL (ref 3.88–5.62)
SODIUM SERPL-SCNC: 140 MMOL/L (ref 136–145)
WBC # BLD AUTO: 7.54 THOUSAND/UL (ref 4.31–10.16)

## 2019-06-26 PROCEDURE — 82542 COL CHROMOTOGRAPHY QUAL/QUAN: CPT | Performed by: INTERNAL MEDICINE

## 2019-06-26 PROCEDURE — 99232 SBSQ HOSP IP/OBS MODERATE 35: CPT | Performed by: PHYSICIAN ASSISTANT

## 2019-06-26 PROCEDURE — 84100 ASSAY OF PHOSPHORUS: CPT | Performed by: INTERNAL MEDICINE

## 2019-06-26 PROCEDURE — 88305 TISSUE EXAM BY PATHOLOGIST: CPT | Performed by: PATHOLOGY

## 2019-06-26 PROCEDURE — 86140 C-REACTIVE PROTEIN: CPT | Performed by: INTERNAL MEDICINE

## 2019-06-26 PROCEDURE — 85025 COMPLETE CBC W/AUTO DIFF WBC: CPT | Performed by: INTERNAL MEDICINE

## 2019-06-26 PROCEDURE — 83735 ASSAY OF MAGNESIUM: CPT | Performed by: INTERNAL MEDICINE

## 2019-06-26 PROCEDURE — 45331 SIGMOIDOSCOPY AND BIOPSY: CPT | Performed by: INTERNAL MEDICINE

## 2019-06-26 PROCEDURE — 84145 PROCALCITONIN (PCT): CPT | Performed by: INTERNAL MEDICINE

## 2019-06-26 PROCEDURE — 80053 COMPREHEN METABOLIC PANEL: CPT | Performed by: INTERNAL MEDICINE

## 2019-06-26 PROCEDURE — 0DBP8ZX EXCISION OF RECTUM, VIA NATURAL OR ARTIFICIAL OPENING ENDOSCOPIC, DIAGNOSTIC: ICD-10-PCS | Performed by: INTERNAL MEDICINE

## 2019-06-26 PROCEDURE — 0DBE8ZX EXCISION OF LARGE INTESTINE, VIA NATURAL OR ARTIFICIAL OPENING ENDOSCOPIC, DIAGNOSTIC: ICD-10-PCS | Performed by: INTERNAL MEDICINE

## 2019-06-26 RX ORDER — SODIUM CHLORIDE, SODIUM LACTATE, POTASSIUM CHLORIDE, CALCIUM CHLORIDE 600; 310; 30; 20 MG/100ML; MG/100ML; MG/100ML; MG/100ML
125 INJECTION, SOLUTION INTRAVENOUS CONTINUOUS
Status: DISCONTINUED | OUTPATIENT
Start: 2019-06-26 | End: 2019-06-26

## 2019-06-26 RX ORDER — METHYLPREDNISOLONE SODIUM SUCCINATE 40 MG/ML
15 INJECTION, POWDER, LYOPHILIZED, FOR SOLUTION INTRAMUSCULAR; INTRAVENOUS EVERY 6 HOURS SCHEDULED
Status: DISCONTINUED | OUTPATIENT
Start: 2019-06-26 | End: 2019-06-28 | Stop reason: HOSPADM

## 2019-06-26 RX ORDER — LIDOCAINE HYDROCHLORIDE 10 MG/ML
INJECTION, SOLUTION INFILTRATION; PERINEURAL AS NEEDED
Status: DISCONTINUED | OUTPATIENT
Start: 2019-06-26 | End: 2019-06-26 | Stop reason: SURG

## 2019-06-26 RX ORDER — PROPOFOL 10 MG/ML
INJECTION, EMULSION INTRAVENOUS CONTINUOUS PRN
Status: DISCONTINUED | OUTPATIENT
Start: 2019-06-26 | End: 2019-06-26 | Stop reason: SURG

## 2019-06-26 RX ORDER — SODIUM CHLORIDE, SODIUM LACTATE, POTASSIUM CHLORIDE, CALCIUM CHLORIDE 600; 310; 30; 20 MG/100ML; MG/100ML; MG/100ML; MG/100ML
INJECTION, SOLUTION INTRAVENOUS CONTINUOUS PRN
Status: DISCONTINUED | OUTPATIENT
Start: 2019-06-26 | End: 2019-06-26 | Stop reason: SURG

## 2019-06-26 RX ORDER — PROPOFOL 10 MG/ML
INJECTION, EMULSION INTRAVENOUS AS NEEDED
Status: DISCONTINUED | OUTPATIENT
Start: 2019-06-26 | End: 2019-06-26 | Stop reason: SURG

## 2019-06-26 RX ADMIN — PIPERACILLIN AND TAZOBACTAM 3.38 G: 3; .375 INJECTION, POWDER, FOR SOLUTION INTRAVENOUS at 06:05

## 2019-06-26 RX ADMIN — TRAMADOL HYDROCHLORIDE 50 MG: 50 TABLET, COATED ORAL at 18:20

## 2019-06-26 RX ADMIN — LIDOCAINE HYDROCHLORIDE 30 MG: 10 INJECTION, SOLUTION INFILTRATION; PERINEURAL at 09:37

## 2019-06-26 RX ADMIN — SODIUM CHLORIDE 125 ML/HR: 0.9 INJECTION, SOLUTION INTRAVENOUS at 03:36

## 2019-06-26 RX ADMIN — METHYLPREDNISOLONE SODIUM SUCCINATE 15.2 MG: 40 INJECTION, POWDER, FOR SOLUTION INTRAMUSCULAR; INTRAVENOUS at 18:13

## 2019-06-26 RX ADMIN — TRAMADOL HYDROCHLORIDE 50 MG: 50 TABLET, COATED ORAL at 07:27

## 2019-06-26 RX ADMIN — PROPOFOL 150 MG: 10 INJECTION, EMULSION INTRAVENOUS at 09:37

## 2019-06-26 RX ADMIN — SODIUM CHLORIDE 125 ML/HR: 0.9 INJECTION, SOLUTION INTRAVENOUS at 15:53

## 2019-06-26 RX ADMIN — PROPOFOL 150 MCG/KG/MIN: 10 INJECTION, EMULSION INTRAVENOUS at 09:38

## 2019-06-26 RX ADMIN — ENOXAPARIN SODIUM 40 MG: 40 INJECTION SUBCUTANEOUS at 01:32

## 2019-06-26 RX ADMIN — SODIUM CHLORIDE, SODIUM LACTATE, POTASSIUM CHLORIDE, AND CALCIUM CHLORIDE: .6; .31; .03; .02 INJECTION, SOLUTION INTRAVENOUS at 09:26

## 2019-06-26 RX ADMIN — METHYLPREDNISOLONE SODIUM SUCCINATE 15.2 MG: 40 INJECTION, POWDER, FOR SOLUTION INTRAMUSCULAR; INTRAVENOUS at 12:34

## 2019-06-26 RX ADMIN — PROPOFOL 50 MG: 10 INJECTION, EMULSION INTRAVENOUS at 09:41

## 2019-06-26 RX ADMIN — PIPERACILLIN AND TAZOBACTAM 3.38 G: 3; .375 INJECTION, POWDER, FOR SOLUTION INTRAVENOUS at 11:09

## 2019-06-26 RX ADMIN — PIPERACILLIN AND TAZOBACTAM 3.38 G: 3; .375 INJECTION, POWDER, FOR SOLUTION INTRAVENOUS at 01:14

## 2019-06-26 NOTE — PLAN OF CARE
Problem: GASTROINTESTINAL - ADULT  Goal: Minimal or absence of nausea and/or vomiting  Description  INTERVENTIONS:  - Administer IV fluids as ordered to ensure adequate hydration  - Maintain NPO status until nausea and vomiting are resolved  - Nasogastric tube as ordered  - Administer ordered antiemetic medications as needed  - Provide nonpharmacologic comfort measures as appropriate  - Advance diet as tolerated, if ordered  - Nutrition services referral to assist patient with adequate nutrition and appropriate food choices  Outcome: Progressing  Goal: Maintains adequate nutritional intake  Description  INTERVENTIONS:  - Monitor percentage of each meal consumed  - Identify factors contributing to decreased intake, treat as appropriate  - Assist with meals as needed  - Monitor I&O, WT and lab values  - Obtain nutrition services referral as needed  Outcome: Progressing     Problem: PAIN - ADULT  Goal: Verbalizes/displays adequate comfort level or baseline comfort level  Description  Interventions:  - Encourage patient to monitor pain and request assistance  - Assess pain using appropriate pain scale  - Administer analgesics based on type and severity of pain and evaluate response  - Implement non-pharmacological measures as appropriate and evaluate response  - Consider cultural and social influences on pain and pain management  - Notify physician/advanced practitioner if interventions unsuccessful or patient reports new pain  Outcome: Progressing     Problem: INFECTION - ADULT  Goal: Absence or prevention of progression during hospitalization  Description  INTERVENTIONS:  - Assess and monitor for signs and symptoms of infection  - Monitor lab/diagnostic results  - Monitor all insertion sites  - [place] appropriate cooling/warming therapies per order  - Administer medications as ordered  - Instruct and encourage patient and family to use good hand hygiene technique  - Identify and instruct in appropriate isolation precautions for identified infection/condition   Outcome: Progressing     Problem: Knowledge Deficit  Goal: Patient/family/caregiver demonstrates understanding of disease process, treatment plan, medications, and discharge instructions  Description  Complete learning assessment and assess knowledge base  Interventions:  - Provide teaching at level of understanding  - Provide teaching via preferred learning methods  Outcome: Progressing     Problem: Nutrition/Hydration-ADULT  Goal: Nutrient/Hydration intake appropriate for improving, restoring or maintaining nutritional needs  Description  Monitor and assess patient's nutrition/hydration status for malnutrition (ex- brittle hair, bruises, dry skin, pale skin and conjunctiva, muscle wasting, smooth red tongue, and disorientation)  Collaborate with interdisciplinary team and initiate plan and interventions as ordered  Monitor patient's weight and dietary intake as ordered or per policy  Utilize nutrition screening tool and intervene per policy  Determine patient's food preferences and provide high-protein, high-caloric foods as appropriate       INTERVENTIONS:  - Monitor oral intake, urinary output, labs, and treatment plans  - Assess nutrition and hydration status and recommend course of action  - Evaluate amount of meals eaten  - Assist patient with eating if necessary   - Allow adequate time for meals  - Recommend/ encourage appropriate diets, oral nutritional supplements, and vitamin/mineral supplements  - Order, calculate, and assess calorie counts as needed  - Recommend, monitor, and adjust tube feedings and TPN/PPN based on assessed needs  - Assess need for intravenous fluids  - Provide specific nutrition/hydration education as appropriate  - Include patient/family/caregiver in decisions related to nutrition  Outcome: Progressing     Problem: Potential for Falls  Goal: Patient will remain free of falls  Description  INTERVENTIONS:  - Assess patient frequently for physical needs  -  Identify cognitive and physical deficits and behaviors that affect risk of falls    -  [place] fall precautions as indicated by assessment   - Educate patient/family on patient safety including physical limitations  - Instruct patient to call for assistance with activity based on assessment  - Modify environment to reduce risk of injury  - Consider OT/PT consult to assist with strengthening/mobility  Outcome: Progressing     Problem: DISCHARGE PLANNING - CARE MANAGEMENT  Goal: Discharge to post-acute care or home with appropriate resources  Description  INTERVENTIONS:  - Conduct assessment to determine patient/family and health care team treatment goals, and need for post-acute services based on payer coverage, community resources, and patient preferences, and barriers to discharge  - Address psychosocial, clinical, and financial barriers to discharge as identified in assessment in conjunction with the patient/family and health care team  - Arrange appropriate level of post-acute services according to patient's   needs and preference and payer coverage in collaboration with the physician and health care team  - Communicate with and update the patient/family, physician, and health care team regarding progress on the discharge plan  - Arrange appropriate transportation to post-acute venues  -outpatient follow up  -PATHs to see pt re:medical assistance application   Outcome: Progressing

## 2019-06-26 NOTE — ASSESSMENT & PLAN NOTE
In the setting of ulcerative colitis with WBC 15 29, tachycardia  Now resolved     IV antibiotics discontinued  Continue IV fluids for OMA

## 2019-06-26 NOTE — ASSESSMENT & PLAN NOTE
Symptoms ongoing for past 6-8 weeks, associated with 30lb weight loss, cramping abdominal pain, and poor oral intake  No hematochezia  Notes family hx of IBD  Treated with bentyl, cipro and flagyl with no improvement in outpatient setting  Colonoscopy:   Severe ulcerative colitis extending in a continuous fashion from the rectum through the transverse colon  Further advancement of the scope was not performed due to severe colitis and looping  C diff - negative  PO vanco discontinued  Enteric Panel, fecal Calprotectin, O&P, Leucocytes- pending  Heme Test all stool  Will discontinue IV Zosyn   Gastroenterology consultation appreciated  The patient was started on Solu Medrol 15 mg IV q6h  Patient started on a lo fiber/lo residue diet      Continue IVF for now given diarrhea, antiemetics

## 2019-06-26 NOTE — PROGRESS NOTES
Progress Note - Giovany Balderas 1990, 34 y o  male MRN: [id number]    Unit/Bed#: 423-01 Encounter: [id number]    Primary Care Provider: Keyur Baldwin DO   Date and time admitted to hospital: 6/24/2019  5:52 AM        * Ulcerative colitis ([facility] [address] )  Assessment & Plan  Symptoms ongoing for past 6-8 weeks, associated with 30lb weight loss, cramping abdominal pain, and poor oral intake  No hematochezia  Notes family hx of IBD  Treated with bentyl, cipro and flagyl with no improvement in outpatient setting  Colonoscopy:   Severe ulcerative colitis extending in a continuous fashion from the rectum through the transverse colon  Further advancement of the scope was not performed due to severe colitis and looping  C diff - negative  PO vanco discontinued  Enteric Panel, fecal Calprotectin, O&P, Leucocytes- pending  Heme Test all stool  Will discontinue IV Zosyn   Gastroenterology consultation appreciated  The patient was started on Solu Medrol 15 mg IV q6h  Patient started on a lo fiber/lo residue diet  Continue IVF for now given diarrhea, antiemetics    Leukocytosis  Assessment & Plan  Resolved  With bandemia, suspect reactive 2/2 above  Afebrile       Thrombocytosis ([facility] [address] )  Assessment & Plan  Suspect reactive 2/2 above     OMA (acute kidney injury) ([facility] [address] )  Assessment & Plan  Likely secondary to pre renal azotemia  Creatinine 1 48 on admission  Creatinine still increased at 1 62  Continue IVF  125cc/hr  PVR for completeness      SIRS (systemic inflammatory response syndrome) ([facility] [address] )  Assessment & Plan  In the setting of ulcerative colitis with WBC 15 29, tachycardia  Now resolved  IV antibiotics discontinued  Continue IV fluids for OMA      VTE Pharmacologic Prophylaxis:   Pharmacologic: Enoxaparin (Lovenox)  Mechanical VTE Prophylaxis in Place: Yes    Patient Centered Rounds: I have performed bedside rounds with nursing staff today      Discussions with Specialists or Other Care Team Provider: nursing, CM, and attending    Education and Discussions with Family / Patient: family updated at bedside    Time Spent for Care: 20 minutes  More than 50% of total time spent on counseling and coordination of care as described above  Current Length of Stay: 2 day(s)    Current Patient Status: Inpatient   Certification Statement: The patient will continue to require additional inpatient hospital stay due to treatment of UC with IV steroids and IV fluids  Discharge Plan: TBD    Code Status: Level 1 - Full Code      Subjective: The patient was seen and examined  The patient states his diarrhea has improved, he denies abdominal pain  Objective:     Vitals:   Temp (24hrs), Av 7 °F (36 5 °C), Min:97 °F (36 1 °C), Max:98 3 °F (36 8 °C)    Temp:  [97 °F (36 1 °C)-98 3 °F (36 8 °C)] 97 8 °F (36 6 °C)  HR:  [58-80] 77  Resp:  [16-20] 16  BP: (120-129)/(63-76) 125/63  SpO2:  [97 %-100 %] 98 %  Body mass index is 27 17 kg/m²  Input and Output Summary (last 24 hours): Intake/Output Summary (Last 24 hours) at 2019 1623  Last data filed at 2019 1551  Gross per 24 hour   Intake 5281 25 ml   Output 250 ml   Net 5031 25 ml       Physical Exam:     Physical Exam   Constitutional: He is oriented to person, place, and time  Vital signs are normal  He appears well-developed and well-nourished  He is cooperative  Cardiovascular: Normal rate and regular rhythm  Pulmonary/Chest: Effort normal  He has no wheezes  He has no rhonchi  He has no rales  Abdominal: Soft  Normal appearance and bowel sounds are normal  There is no tenderness  Neurological: He is alert and oriented to person, place, and time  No cranial nerve deficit  Skin: Skin is warm, dry and intact  Nursing note and vitals reviewed        Additional Data:     Labs:    Results from last 7 days   Lab Units 19  0602   WBC Thousand/uL 7 54   HEMOGLOBIN g/dL 12 8   HEMATOCRIT % 39 7   PLATELETS Thousands/uL 400*   NEUTROS PCT % 57 LYMPHS PCT % 21   MONOS PCT % 10   EOS PCT % 10*     Results from last 7 days   Lab Units 06/26/19  0602   SODIUM mmol/L 140   POTASSIUM mmol/L 3 6   CHLORIDE mmol/L 105   CO2 mmol/L 30   BUN mg/dL 6   CREATININE mg/dL 1 62*   ANION GAP mmol/L 5   CALCIUM mg/dL 8 3   ALBUMIN g/dL 2 8*   TOTAL BILIRUBIN mg/dL 0 30   ALK PHOS U/L 67   ALT U/L 28   AST U/L 28   GLUCOSE RANDOM mg/dL 83     Results from last 7 days   Lab Units 06/24/19  0608   INR  1 21*             Results from last 7 days   Lab Units 06/26/19  0602 06/24/19  1138 06/24/19  0647 06/24/19  7645   LACTIC ACID mmol/L  --   --   --  1 5   PROCALCITONIN ng/ml 0 11 0 08 0 13  --            * I Have Reviewed All Lab Data Listed Above  * Additional Pertinent Lab Tests Reviewed: All Labs Within Last 24 Hours Reviewed    Imaging:    Imaging Reports Reviewed Today Include: Colonoscopy  Imaging Personally Reviewed by Myself Includes:  none    Recent Cultures (last 7 days):     Results from last 7 days   Lab Units 06/24/19 2002 06/24/19  0647 06/24/19  0644   BLOOD CULTURE   --  No Growth at 48 hrs  No Growth at 48 hrs  C DIFF TOXIN B  NEGATIVE for C difficle toxin by PCR    --   --        Last 24 Hours Medication List:     Current Facility-Administered Medications:  acetaminophen 650 mg Oral Q6H PRN Herlinda MD Leticia    enoxaparin 40 mg Subcutaneous Q24H Herlinda MD Leticia    methylPREDNISolone sodium succinate 15 2 mg Intravenous Q6H [facility] Herlinda MD Leticia    ondansetron 4 mg Intravenous Q4H PRN Herlinda MD Leticia    sodium chloride 125 mL/hr Intravenous Continuous Herlinda MD Leticia Last Rate: 125 mL/hr (06/26/19 1553)   traMADol 50 mg Oral Q6H PRN Herlindajoe Kelly MD         Today, Patient Was Seen By: Warren Mazariegos PA-C    ** Please Note: Dictation voice to text software may have been used in the creation of this document   **

## 2019-06-26 NOTE — ASSESSMENT & PLAN NOTE
Likely secondary to pre renal azotemia  Creatinine 1 48 on admission  Creatinine still increased at 1 62  Continue IVF  125cc/hr  PVR for completeness

## 2019-06-27 LAB
ALBUMIN SERPL BCP-MCNC: 2.9 G/DL (ref 3.5–5)
ALP SERPL-CCNC: 71 U/L (ref 46–116)
ALT SERPL W P-5'-P-CCNC: 32 U/L (ref 12–78)
ANION GAP SERPL CALCULATED.3IONS-SCNC: 4 MMOL/L (ref 4–13)
AST SERPL W P-5'-P-CCNC: 31 U/L (ref 5–45)
BASOPHILS # BLD AUTO: 0.02 THOUSANDS/ΜL (ref 0–0.1)
BASOPHILS NFR BLD AUTO: 0 % (ref 0–1)
BILIRUB SERPL-MCNC: 0.2 MG/DL (ref 0.2–1)
BUN SERPL-MCNC: 2 MG/DL (ref 5–25)
CALCIUM SERPL-MCNC: 8.5 MG/DL (ref 8.3–10.1)
CHLORIDE SERPL-SCNC: 106 MMOL/L (ref 100–108)
CO2 SERPL-SCNC: 30 MMOL/L (ref 21–32)
CREAT SERPL-MCNC: 1.22 MG/DL (ref 0.6–1.3)
EOSINOPHIL # BLD AUTO: 0 THOUSAND/ΜL (ref 0–0.61)
EOSINOPHIL NFR BLD AUTO: 0 % (ref 0–6)
ERYTHROCYTE [DISTWIDTH] IN BLOOD BY AUTOMATED COUNT: 12.6 % (ref 11.6–15.1)
GFR SERPL CREATININE-BSD FRML MDRD: 81 ML/MIN/1.73SQ M
GLUCOSE SERPL-MCNC: 116 MG/DL (ref 65–140)
HBV CORE AB SER QL: NORMAL
HBV CORE IGM SER QL: NORMAL
HBV SURFACE AG SER QL: NORMAL
HCT VFR BLD AUTO: 42.2 % (ref 36.5–49.3)
HCV AB SER QL: NORMAL
HGB BLD-MCNC: 13.8 G/DL (ref 12–17)
IMM GRANULOCYTES # BLD AUTO: 0.04 THOUSAND/UL (ref 0–0.2)
IMM GRANULOCYTES NFR BLD AUTO: 1 % (ref 0–2)
LYMPHOCYTES # BLD AUTO: 0.84 THOUSANDS/ΜL (ref 0.6–4.47)
LYMPHOCYTES NFR BLD AUTO: 12 % (ref 14–44)
MAGNESIUM SERPL-MCNC: 2.2 MG/DL (ref 1.6–2.6)
MCH RBC QN AUTO: 29.4 PG (ref 26.8–34.3)
MCHC RBC AUTO-ENTMCNC: 32.7 G/DL (ref 31.4–37.4)
MCV RBC AUTO: 90 FL (ref 82–98)
MONOCYTES # BLD AUTO: 0.29 THOUSAND/ΜL (ref 0.17–1.22)
MONOCYTES NFR BLD AUTO: 4 % (ref 4–12)
NEUTROPHILS # BLD AUTO: 5.8 THOUSANDS/ΜL (ref 1.85–7.62)
NEUTS SEG NFR BLD AUTO: 83 % (ref 43–75)
NRBC BLD AUTO-RTO: 0 /100 WBCS
O+P STL CONC: NORMAL
PLATELET # BLD AUTO: 488 THOUSANDS/UL (ref 149–390)
PMV BLD AUTO: 9.6 FL (ref 8.9–12.7)
POTASSIUM SERPL-SCNC: 4 MMOL/L (ref 3.5–5.3)
PROT SERPL-MCNC: 7.1 G/DL (ref 6.4–8.2)
RBC # BLD AUTO: 4.69 MILLION/UL (ref 3.88–5.62)
SODIUM SERPL-SCNC: 140 MMOL/L (ref 136–145)
WBC # BLD AUTO: 6.99 THOUSAND/UL (ref 4.31–10.16)
WBC SPEC QL GRAM STN: ABNORMAL

## 2019-06-27 PROCEDURE — 87340 HEPATITIS B SURFACE AG IA: CPT | Performed by: INTERNAL MEDICINE

## 2019-06-27 PROCEDURE — 86480 TB TEST CELL IMMUN MEASURE: CPT | Performed by: INTERNAL MEDICINE

## 2019-06-27 PROCEDURE — 80053 COMPREHEN METABOLIC PANEL: CPT | Performed by: PHYSICIAN ASSISTANT

## 2019-06-27 PROCEDURE — 99232 SBSQ HOSP IP/OBS MODERATE 35: CPT | Performed by: PHYSICIAN ASSISTANT

## 2019-06-27 PROCEDURE — 83735 ASSAY OF MAGNESIUM: CPT | Performed by: PHYSICIAN ASSISTANT

## 2019-06-27 PROCEDURE — 86803 HEPATITIS C AB TEST: CPT | Performed by: INTERNAL MEDICINE

## 2019-06-27 PROCEDURE — 86705 HEP B CORE ANTIBODY IGM: CPT | Performed by: INTERNAL MEDICINE

## 2019-06-27 PROCEDURE — 86704 HEP B CORE ANTIBODY TOTAL: CPT | Performed by: INTERNAL MEDICINE

## 2019-06-27 PROCEDURE — 85025 COMPLETE CBC W/AUTO DIFF WBC: CPT | Performed by: PHYSICIAN ASSISTANT

## 2019-06-27 RX ORDER — SODIUM CHLORIDE 9 MG/ML
100 INJECTION, SOLUTION INTRAVENOUS CONTINUOUS
Status: DISCONTINUED | OUTPATIENT
Start: 2019-06-27 | End: 2019-06-28 | Stop reason: HOSPADM

## 2019-06-27 RX ADMIN — TRAMADOL HYDROCHLORIDE 50 MG: 50 TABLET, COATED ORAL at 09:02

## 2019-06-27 RX ADMIN — SODIUM CHLORIDE 100 ML/HR: 0.9 INJECTION, SOLUTION INTRAVENOUS at 20:45

## 2019-06-27 RX ADMIN — METHYLPREDNISOLONE SODIUM SUCCINATE 15.2 MG: 40 INJECTION, POWDER, FOR SOLUTION INTRAMUSCULAR; INTRAVENOUS at 00:20

## 2019-06-27 RX ADMIN — SODIUM CHLORIDE 125 ML/HR: 0.9 INJECTION, SOLUTION INTRAVENOUS at 09:07

## 2019-06-27 RX ADMIN — TRAMADOL HYDROCHLORIDE 50 MG: 50 TABLET, COATED ORAL at 22:55

## 2019-06-27 RX ADMIN — METHYLPREDNISOLONE SODIUM SUCCINATE 15.2 MG: 40 INJECTION, POWDER, FOR SOLUTION INTRAMUSCULAR; INTRAVENOUS at 23:32

## 2019-06-27 RX ADMIN — METHYLPREDNISOLONE SODIUM SUCCINATE 15.2 MG: 40 INJECTION, POWDER, FOR SOLUTION INTRAMUSCULAR; INTRAVENOUS at 11:23

## 2019-06-27 RX ADMIN — SODIUM CHLORIDE 100 ML/HR: 0.9 INJECTION, SOLUTION INTRAVENOUS at 11:43

## 2019-06-27 RX ADMIN — METHYLPREDNISOLONE SODIUM SUCCINATE 15.2 MG: 40 INJECTION, POWDER, FOR SOLUTION INTRAMUSCULAR; INTRAVENOUS at 17:12

## 2019-06-27 RX ADMIN — METHYLPREDNISOLONE SODIUM SUCCINATE 15.2 MG: 40 INJECTION, POWDER, FOR SOLUTION INTRAMUSCULAR; INTRAVENOUS at 06:25

## 2019-06-27 NOTE — ASSESSMENT & PLAN NOTE
Resolved    Likely secondary to pre renal azotemia  Creatinine 1 48 on admission  Creatinine today: 1 22  Will decrease IVF to 100cc/hr

## 2019-06-27 NOTE — PROGRESS NOTES
Progress Note - Teresita Foss 1990, 34 y o  male MRN: [id number]    Unit/Bed#: 423-01 Encounter: [id number]    Primary Care Provider: Samella Leyden, DO   Date and time admitted to hospital: 6/24/2019  5:52 AM        * Ulcerative colitis ([facility] [address] )  Assessment & Plan  Symptoms ongoing for past 6-8 weeks, associated with 30lb weight loss, cramping abdominal pain, and poor oral intake  No hematochezia  Notes family hx of IBD  Treated with bentyl, cipro and flagyl with no improvement in outpatient setting  Colonoscopy:   Severe ulcerative colitis extending in a continuous fashion from the rectum through the transverse colon  Further advancement of the scope was not performed due to severe colitis and looping  C diff - negative  PO vanco discontinued  Enteric Panel- negative  Fecal Calprotectin, O&P, Leucocytes- pending  Heme Test all stool  IV Zosyn discontinued on 6/26  Gastroenterology consultation appreciated  Continue Solu Medrol 15 mg IV q6h  Continue lo fiber/lo residue diet  Patient's diarrhea is improving  Will decrease IVF to 100 mL/hr, antiemetics    Leukocytosis  Assessment & Plan  Resolved  With bandemia, suspect reactive 2/2 above  Afebrile       Thrombocytosis ([facility] [address] )  Assessment & Plan  Suspect reactive 2/2 above     OAM (acute kidney injury) ([facility] [address] )  Assessment & Plan  Resolved  Likely secondary to pre renal azotemia  Creatinine 1 48 on admission  Creatinine today: 1 22  Will decrease IVF to 100cc/hr    SIRS (systemic inflammatory response syndrome) ([facility] [address] )  Assessment & Plan  In the setting of ulcerative colitis with WBC 15 29, tachycardia  Now resolved  IV antibiotics discontinued  Continue IV fluids due to continued diarrhea  VTE Pharmacologic Prophylaxis:   Pharmacologic: Enoxaparin (Lovenox)  Mechanical VTE Prophylaxis in Place: Yes    Patient Centered Rounds: I have performed bedside rounds with nursing staff today      Discussions with Specialists or Other Care Team Provider: nursing, CM, and attending      Time Spent for Care: 20 minutes  More than 50% of total time spent on counseling and coordination of care as described above  Current Length of Stay: 3 day(s)    Current Patient Status: Inpatient   Certification Statement: The patient will continue to require additional inpatient hospital stay due to continued need for IV steroids and IV fluids  Discharge Plan: TBD    Code Status: Level 1 - Full Code      Subjective: The patient was seen and examined  The patient states he had 1 episode of bloody diarrhea today so far  He denies any abdominal pain  He is tolerating his diet  Objective:     Vitals:   Temp (24hrs), Av 2 °F (36 2 °C), Min:96 6 °F (35 9 °C), Max:97 8 °F (36 6 °C)    Temp:  [96 6 °F (35 9 °C)-97 8 °F (36 6 °C)] 96 8 °F (36 °C)  HR:  [58-77] 58  Resp:  [16-18] 18  BP: (124-127)/(63-72) 124/69  SpO2:  [97 %-99 %] 99 %  Body mass index is 26 64 kg/m²  Input and Output Summary (last 24 hours): Intake/Output Summary (Last 24 hours) at 2019 1232  Last data filed at 2019 2578  Gross per 24 hour   Intake 3752 92 ml   Output 100 ml   Net 3652 92 ml       Physical Exam:     Physical Exam   Constitutional: He is oriented to person, place, and time  Vital signs are normal  He appears well-developed and well-nourished  He is active and cooperative  Cardiovascular: Normal rate and regular rhythm  Pulmonary/Chest: Effort normal and breath sounds normal  He has no wheezes  He has no rhonchi  He has no rales  Abdominal: Soft  Normal appearance  There is no tenderness  Neurological: He is alert and oriented to person, place, and time  No cranial nerve deficit  Skin: Skin is warm, dry and intact  Nursing note and vitals reviewed          Additional Data:     Labs:    Results from last 7 days   Lab Units 19  0620   WBC Thousand/uL 6 99   HEMOGLOBIN g/dL 13 8   HEMATOCRIT % 42 2   PLATELETS Thousands/uL 488*   NEUTROS PCT % 83*   LYMPHS PCT % 12*   MONOS PCT % 4   EOS PCT % 0     Results from last 7 days   Lab Units 06/27/19  0620   SODIUM mmol/L 140   POTASSIUM mmol/L 4 0   CHLORIDE mmol/L 106   CO2 mmol/L 30   BUN mg/dL 2*   CREATININE mg/dL 1 22   ANION GAP mmol/L 4   CALCIUM mg/dL 8 5   ALBUMIN g/dL 2 9*   TOTAL BILIRUBIN mg/dL 0 20   ALK PHOS U/L 71   ALT U/L 32   AST U/L 31   GLUCOSE RANDOM mg/dL 116     Results from last 7 days   Lab Units 06/24/19  0608   INR  1 21*             Results from last 7 days   Lab Units 06/26/19  0602 06/24/19  1138 06/24/19  0647 06/24/19  6104   LACTIC ACID mmol/L  --   --   --  1 5   PROCALCITONIN ng/ml 0 11 0 08 0 13  --            * I Have Reviewed All Lab Data Listed Above  * Additional Pertinent Lab Tests Reviewed: All Labs Within Last 24 Hours Reviewed    Imaging:    Imaging Reports Reviewed Today Include: none  Imaging Personally Reviewed by Myself Includes:  none    Recent Cultures (last 7 days):     Results from last 7 days   Lab Units 06/24/19 2002 06/24/19  0647 06/24/19  0644   BLOOD CULTURE   --  No Growth at 72 hrs  No Growth at 72 hrs  C DIFF TOXIN B  NEGATIVE for C difficle toxin by PCR    --   --        Last 24 Hours Medication List:     Current Facility-Administered Medications:  acetaminophen 650 mg Oral Q6H PRN Brian Britt MD    enoxaparin 40 mg Subcutaneous Q24H Brian Britt MD    methylPREDNISolone sodium succinate 15 2 mg Intravenous Q6H [facility] Brian Britt MD    ondansetron 4 mg Intravenous Q4H PRN Brian Britt MD    sodium chloride 100 mL/hr Intravenous Continuous Fei Wright PA-C Last Rate: 100 mL/hr (06/27/19 1143)   traMADol 50 mg Oral Q6H PRN Brian Britt MD         Today, Patient Was Seen By: Fei Wright PA-C    ** Please Note: Dictation voice to text software may have been used in the creation of this document   **

## 2019-06-27 NOTE — ASSESSMENT & PLAN NOTE
In the setting of ulcerative colitis with WBC 15 29, tachycardia  Now resolved  IV antibiotics discontinued  Continue IV fluids due to continued diarrhea

## 2019-06-27 NOTE — ASSESSMENT & PLAN NOTE
Symptoms ongoing for past 6-8 weeks, associated with 30lb weight loss, cramping abdominal pain, and poor oral intake  No hematochezia  Notes family hx of IBD  Treated with bentyl, cipro and flagyl with no improvement in outpatient setting  Colonoscopy:   Severe ulcerative colitis extending in a continuous fashion from the rectum through the transverse colon  Further advancement of the scope was not performed due to severe colitis and looping  C diff - negative  PO vanco discontinued  Enteric Panel- negative  Fecal Calprotectin, O&P, Leucocytes- pending  Heme Test all stool  IV Zosyn discontinued on 6/26  Gastroenterology consultation appreciated  Continue Solu Medrol 15 mg IV q6h  Continue lo fiber/lo residue diet  Patient's diarrhea is improving     Will decrease IVF to 100 mL/hr, antiemetics

## 2019-06-27 NOTE — PLAN OF CARE
Problem: GASTROINTESTINAL - ADULT  Goal: Minimal or absence of nausea and/or vomiting  Description  INTERVENTIONS:  - Administer IV fluids as ordered to ensure adequate hydration  - Maintain NPO status until nausea and vomiting are resolved  - Nasogastric tube as ordered  - Administer ordered antiemetic medications as needed  - Provide nonpharmacologic comfort measures as appropriate  - Advance diet as tolerated, if ordered  - Nutrition services referral to assist patient with adequate nutrition and appropriate food choices  Outcome: Progressing  Goal: Maintains adequate nutritional intake  Description  INTERVENTIONS:  - Monitor percentage of each meal consumed  - Identify factors contributing to decreased intake, treat as appropriate  - Assist with meals as needed  - Monitor I&O, WT and lab values  - Obtain nutrition services referral as needed  Outcome: Progressing     Problem: PAIN - ADULT  Goal: Verbalizes/displays adequate comfort level or baseline comfort level  Description  Interventions:  - Encourage patient to monitor pain and request assistance  - Assess pain using appropriate pain scale  - Administer analgesics based on type and severity of pain and evaluate response  - Implement non-pharmacological measures as appropriate and evaluate response  - Consider cultural and social influences on pain and pain management  - Notify physician/advanced practitioner if interventions unsuccessful or patient reports new pain  Outcome: Progressing     Problem: INFECTION - ADULT  Goal: Absence or prevention of progression during hospitalization  Description  INTERVENTIONS:  - Assess and monitor for signs and symptoms of infection  - Monitor lab/diagnostic results  - Monitor all insertion sites  - [place] appropriate cooling/warming therapies per order  - Administer medications as ordered  - Instruct and encourage patient and family to use good hand hygiene technique  - Identify and instruct in appropriate isolation precautions for identified infection/condition   Outcome: Progressing     Problem: Knowledge Deficit  Goal: Patient/family/caregiver demonstrates understanding of disease process, treatment plan, medications, and discharge instructions  Description  Complete learning assessment and assess knowledge base  Interventions:  - Provide teaching at level of understanding  - Provide teaching via preferred learning methods  Outcome: Progressing     Problem: Nutrition/Hydration-ADULT  Goal: Nutrient/Hydration intake appropriate for improving, restoring or maintaining nutritional needs  Description  Monitor and assess patient's nutrition/hydration status for malnutrition (ex- brittle hair, bruises, dry skin, pale skin and conjunctiva, muscle wasting, smooth red tongue, and disorientation)  Collaborate with interdisciplinary team and initiate plan and interventions as ordered  Monitor patient's weight and dietary intake as ordered or per policy  Utilize nutrition screening tool and intervene per policy  Determine patient's food preferences and provide high-protein, high-caloric foods as appropriate       INTERVENTIONS:  - Monitor oral intake, urinary output, labs, and treatment plans  - Assess nutrition and hydration status and recommend course of action  - Evaluate amount of meals eaten  - Assist patient with eating if necessary   - Allow adequate time for meals  - Recommend/ encourage appropriate diets, oral nutritional supplements, and vitamin/mineral supplements  - Order, calculate, and assess calorie counts as needed  - Recommend, monitor, and adjust tube feedings and TPN/PPN based on assessed needs  - Assess need for intravenous fluids  - Provide specific nutrition/hydration education as appropriate  - Include patient/family/caregiver in decisions related to nutrition  Outcome: Progressing     Problem: Potential for Falls  Goal: Patient will remain free of falls  Description  INTERVENTIONS:  - Assess patient frequently for physical needs  -  Identify cognitive and physical deficits and behaviors that affect risk of falls    -  [place] fall precautions as indicated by assessment   - Educate patient/family on patient safety including physical limitations  - Instruct patient to call for assistance with activity based on assessment  - Modify environment to reduce risk of injury  - Consider OT/PT consult to assist with strengthening/mobility  Outcome: Progressing     Problem: DISCHARGE PLANNING - CARE MANAGEMENT  Goal: Discharge to post-acute care or home with appropriate resources  Description  INTERVENTIONS:  - Conduct assessment to determine patient/family and health care team treatment goals, and need for post-acute services based on payer coverage, community resources, and patient preferences, and barriers to discharge  - Address psychosocial, clinical, and financial barriers to discharge as identified in assessment in conjunction with the patient/family and health care team  - Arrange appropriate level of post-acute services according to patient's   needs and preference and payer coverage in collaboration with the physician and health care team  - Communicate with and update the patient/family, physician, and health care team regarding progress on the discharge plan  - Arrange appropriate transportation to post-acute venues  -outpatient follow up  -PATHs to see pt re:medical assistance application   Outcome: Progressing

## 2019-06-28 VITALS
BODY MASS INDEX: 26.85 KG/M2 | HEART RATE: 67 BPM | RESPIRATION RATE: 18 BRPM | OXYGEN SATURATION: 98 % | TEMPERATURE: 97.5 F | DIASTOLIC BLOOD PRESSURE: 57 MMHG | HEIGHT: 73 IN | WEIGHT: 202.6 LBS | SYSTOLIC BLOOD PRESSURE: 113 MMHG

## 2019-06-28 PROBLEM — R65.10 SIRS (SYSTEMIC INFLAMMATORY RESPONSE SYNDROME) (HCC): Status: RESOLVED | Noted: 2019-06-26 | Resolved: 2019-06-28

## 2019-06-28 PROBLEM — N17.9 AKI (ACUTE KIDNEY INJURY) (HCC): Status: RESOLVED | Noted: 2019-06-24 | Resolved: 2019-06-28

## 2019-06-28 PROBLEM — D72.829 LEUKOCYTOSIS: Status: RESOLVED | Noted: 2019-06-25 | Resolved: 2019-06-28

## 2019-06-28 LAB
ANION GAP SERPL CALCULATED.3IONS-SCNC: 6 MMOL/L (ref 4–13)
BASOPHILS # BLD AUTO: 0.02 THOUSANDS/ΜL (ref 0–0.1)
BASOPHILS NFR BLD AUTO: 0 % (ref 0–1)
BUN SERPL-MCNC: 4 MG/DL (ref 5–25)
CALCIUM SERPL-MCNC: 8.1 MG/DL (ref 8.3–10.1)
CALPROTECTIN STL-MCNT: 471 UG/G (ref 0–120)
CHLORIDE SERPL-SCNC: 104 MMOL/L (ref 100–108)
CO2 SERPL-SCNC: 28 MMOL/L (ref 21–32)
CREAT SERPL-MCNC: 1.16 MG/DL (ref 0.6–1.3)
CRP SERPL QL: 9.5 MG/L
EOSINOPHIL # BLD AUTO: 0 THOUSAND/ΜL (ref 0–0.61)
EOSINOPHIL NFR BLD AUTO: 0 % (ref 0–6)
ERYTHROCYTE [DISTWIDTH] IN BLOOD BY AUTOMATED COUNT: 12.8 % (ref 11.6–15.1)
GAMMA INTERFERON BACKGROUND BLD IA-ACNC: 0.03 IU/ML
GFR SERPL CREATININE-BSD FRML MDRD: 86 ML/MIN/1.73SQ M
GLUCOSE SERPL-MCNC: 114 MG/DL (ref 65–140)
HCT VFR BLD AUTO: 40.3 % (ref 36.5–49.3)
HGB BLD-MCNC: 13.1 G/DL (ref 12–17)
IMM GRANULOCYTES # BLD AUTO: 0.04 THOUSAND/UL (ref 0–0.2)
IMM GRANULOCYTES NFR BLD AUTO: 0 % (ref 0–2)
LYMPHOCYTES # BLD AUTO: 1.13 THOUSANDS/ΜL (ref 0.6–4.47)
LYMPHOCYTES NFR BLD AUTO: 12 % (ref 14–44)
M TB IFN-G BLD-IMP: ABNORMAL
M TB IFN-G CD4+ BCKGRND COR BLD-ACNC: -0.01 IU/ML
M TB IFN-G CD4+ BCKGRND COR BLD-ACNC: 0 IU/ML
MCH RBC QN AUTO: 29.2 PG (ref 26.8–34.3)
MCHC RBC AUTO-ENTMCNC: 32.5 G/DL (ref 31.4–37.4)
MCV RBC AUTO: 90 FL (ref 82–98)
MITOGEN IGNF BCKGRD COR BLD-ACNC: <0.1 IU/ML
MONOCYTES # BLD AUTO: 0.93 THOUSAND/ΜL (ref 0.17–1.22)
MONOCYTES NFR BLD AUTO: 10 % (ref 4–12)
NEUTROPHILS # BLD AUTO: 7.41 THOUSANDS/ΜL (ref 1.85–7.62)
NEUTS SEG NFR BLD AUTO: 78 % (ref 43–75)
NRBC BLD AUTO-RTO: 0 /100 WBCS
PLATELET # BLD AUTO: 500 THOUSANDS/UL (ref 149–390)
PMV BLD AUTO: 10 FL (ref 8.9–12.7)
POTASSIUM SERPL-SCNC: 4.1 MMOL/L (ref 3.5–5.3)
RBC # BLD AUTO: 4.49 MILLION/UL (ref 3.88–5.62)
SODIUM SERPL-SCNC: 138 MMOL/L (ref 136–145)
WBC # BLD AUTO: 9.53 THOUSAND/UL (ref 4.31–10.16)

## 2019-06-28 PROCEDURE — 80048 BASIC METABOLIC PNL TOTAL CA: CPT | Performed by: PHYSICIAN ASSISTANT

## 2019-06-28 PROCEDURE — 85025 COMPLETE CBC W/AUTO DIFF WBC: CPT | Performed by: PHYSICIAN ASSISTANT

## 2019-06-28 PROCEDURE — 99232 SBSQ HOSP IP/OBS MODERATE 35: CPT | Performed by: INTERNAL MEDICINE

## 2019-06-28 PROCEDURE — 86140 C-REACTIVE PROTEIN: CPT | Performed by: PHYSICIAN ASSISTANT

## 2019-06-28 PROCEDURE — 99238 HOSP IP/OBS DSCHRG MGMT 30/<: CPT | Performed by: PHYSICIAN ASSISTANT

## 2019-06-28 PROCEDURE — 99232 SBSQ HOSP IP/OBS MODERATE 35: CPT | Performed by: PHYSICIAN ASSISTANT

## 2019-06-28 RX ORDER — TRAMADOL HYDROCHLORIDE 50 MG/1
50 TABLET ORAL EVERY 6 HOURS PRN
Qty: 12 TABLET | Refills: 0 | Status: SHIPPED | OUTPATIENT
Start: 2019-06-28 | End: 2020-08-20 | Stop reason: ALTCHOICE

## 2019-06-28 RX ORDER — PREDNISONE 20 MG/1
40 TABLET ORAL DAILY
Qty: 60 TABLET | Refills: 1 | Status: SHIPPED | OUTPATIENT
Start: 2019-06-28 | End: 2019-07-28

## 2019-06-28 RX ADMIN — SODIUM CHLORIDE 100 ML/HR: 0.9 INJECTION, SOLUTION INTRAVENOUS at 10:26

## 2019-06-28 RX ADMIN — METHYLPREDNISOLONE SODIUM SUCCINATE 15.2 MG: 40 INJECTION, POWDER, FOR SOLUTION INTRAMUSCULAR; INTRAVENOUS at 17:33

## 2019-06-28 RX ADMIN — METHYLPREDNISOLONE SODIUM SUCCINATE 15.2 MG: 40 INJECTION, POWDER, FOR SOLUTION INTRAMUSCULAR; INTRAVENOUS at 12:26

## 2019-06-28 RX ADMIN — METHYLPREDNISOLONE SODIUM SUCCINATE 15.2 MG: 40 INJECTION, POWDER, FOR SOLUTION INTRAMUSCULAR; INTRAVENOUS at 05:36

## 2019-06-28 NOTE — PLAN OF CARE
Problem: GASTROINTESTINAL - ADULT  Goal: Minimal or absence of nausea and/or vomiting  Description  INTERVENTIONS:  - Administer IV fluids as ordered to ensure adequate hydration  - Maintain NPO status until nausea and vomiting are resolved  - Nasogastric tube as ordered  - Administer ordered antiemetic medications as needed  - Provide nonpharmacologic comfort measures as appropriate  - Advance diet as tolerated, if ordered  - Nutrition services referral to assist patient with adequate nutrition and appropriate food choices  Outcome: Progressing  Goal: Maintains adequate nutritional intake  Description  INTERVENTIONS:  - Monitor percentage of each meal consumed  - Identify factors contributing to decreased intake, treat as appropriate  - Assist with meals as needed  - Monitor I&O, WT and lab values  - Obtain nutrition services referral as needed  Outcome: Progressing     Problem: PAIN - ADULT  Goal: Verbalizes/displays adequate comfort level or baseline comfort level  Description  Interventions:  - Encourage patient to monitor pain and request assistance  - Assess pain using appropriate pain scale  - Administer analgesics based on type and severity of pain and evaluate response  - Implement non-pharmacological measures as appropriate and evaluate response  - Consider cultural and social influences on pain and pain management  - Notify physician/advanced practitioner if interventions unsuccessful or patient reports new pain  Outcome: Progressing     Problem: INFECTION - ADULT  Goal: Absence or prevention of progression during hospitalization  Description  INTERVENTIONS:  - Assess and monitor for signs and symptoms of infection  - Monitor lab/diagnostic results  - Monitor all insertion sites  - [place] appropriate cooling/warming therapies per order  - Administer medications as ordered  - Instruct and encourage patient and family to use good hand hygiene technique  - Identify and instruct in appropriate isolation precautions for identified infection/condition   Outcome: Progressing     Problem: Knowledge Deficit  Goal: Patient/family/caregiver demonstrates understanding of disease process, treatment plan, medications, and discharge instructions  Description  Complete learning assessment and assess knowledge base  Interventions:  - Provide teaching at level of understanding  - Provide teaching via preferred learning methods  Outcome: Progressing     Problem: Nutrition/Hydration-ADULT  Goal: Nutrient/Hydration intake appropriate for improving, restoring or maintaining nutritional needs  Description  Monitor and assess patient's nutrition/hydration status for malnutrition (ex- brittle hair, bruises, dry skin, pale skin and conjunctiva, muscle wasting, smooth red tongue, and disorientation)  Collaborate with interdisciplinary team and initiate plan and interventions as ordered  Monitor patient's weight and dietary intake as ordered or per policy  Utilize nutrition screening tool and intervene per policy  Determine patient's food preferences and provide high-protein, high-caloric foods as appropriate       INTERVENTIONS:  - Monitor oral intake, urinary output, labs, and treatment plans  - Assess nutrition and hydration status and recommend course of action  - Evaluate amount of meals eaten  - Assist patient with eating if necessary   - Allow adequate time for meals  - Recommend/ encourage appropriate diets, oral nutritional supplements, and vitamin/mineral supplements  - Order, calculate, and assess calorie counts as needed  - Recommend, monitor, and adjust tube feedings and TPN/PPN based on assessed needs  - Assess need for intravenous fluids  - Provide specific nutrition/hydration education as appropriate  - Include patient/family/caregiver in decisions related to nutrition  Outcome: Progressing     Problem: Potential for Falls  Goal: Patient will remain free of falls  Description  INTERVENTIONS:  - Assess patient frequently for physical needs  -  Identify cognitive and physical deficits and behaviors that affect risk of falls    -  [place] fall precautions as indicated by assessment   - Educate patient/family on patient safety including physical limitations  - Instruct patient to call for assistance with activity based on assessment  - Modify environment to reduce risk of injury  - Consider OT/PT consult to assist with strengthening/mobility  Outcome: Progressing     Problem: DISCHARGE PLANNING - CARE MANAGEMENT  Goal: Discharge to post-acute care or home with appropriate resources  Description  INTERVENTIONS:  - Conduct assessment to determine patient/family and health care team treatment goals, and need for post-acute services based on payer coverage, community resources, and patient preferences, and barriers to discharge  - Address psychosocial, clinical, and financial barriers to discharge as identified in assessment in conjunction with the patient/family and health care team  - Arrange appropriate level of post-acute services according to patient's   needs and preference and payer coverage in collaboration with the physician and health care team  - Communicate with and update the patient/family, physician, and health care team regarding progress on the discharge plan  - Arrange appropriate transportation to post-acute venues  -outpatient follow up  -PATHs to see pt re:medical assistance application   Outcome: Progressing

## 2019-06-28 NOTE — DISCHARGE SUMMARY
Discharge- Zain Holt 1990, 34 y o  male MRN: [id number]    Unit/Bed#: 423-01 Encounter: [id number]    Primary Care Provider: Lyly Martin DO   Date and time admitted to hospital: 6/24/2019  5:52 AM      * Ulcerative colitis ([facility] [address] )  Assessment & Plan  · Symptoms ongoing for 6-8 weeks prior to presentation, associated with 30lb weight loss, cramping abdominal pain, and poor oral intake  No hematochezia  Notes possible family hx of IBD in his grandmother  Treated with bentyl, cipro and flagyl with no improvement in outpatient setting  · Patient underwent colonoscopy which showed findings most consistent with ulcerative colitis  Gastroenterology recommending prednisone 40 mg daily until follow-up  · Will need to discuss with Gastroenterology today regarding transition to oral prednisone and taper course on discharge  · Repeat CRP now, patient can follow up on results with GI in next week  · Chronic hepatitis panel is normal, QuantiFERON TB is pending  · Patient will need to follow up on the tissue biopsy results  · Continue low-fiber diet as tolerated  · Monitor stool output   · Gastroenterology has cleared the patient for discharge    SIRS (systemic inflammatory response syndrome) (HCC)resolved as of 6/28/2019  Assessment & Plan  · In the setting of ulcerative colitis with WBC 15 29, tachycardia  · Now resolved  IV antibiotics discontinued  · Consider discontinue IV fluids if stools slow down more today    Leukocytosisresolved as of 6/28/2019  Assessment & Plan  · With bandemia, suspect reactive 2/2 primary issue    This is resolved    OMA (acute kidney injury) (HCC)resolved as of 6/28/2019  Assessment & Plan  · Likely pre renal azotemia secondary to ongoing diarrhea in the setting of inflammatory bowel disorder  · Creatinine 1 48 on admission, resolved to 1 16  · Continue IV fluids for now, if diarrhea continues slow down can discontinue and monitor    Thrombocytosis (HCC)  Assessment & Plan  · Suspect reactive 2/2 above, continue with Lovenox prophylaxis while hospitalized, patient is ambulatory and without lower extremity edema          Discharging Physician / Practitioner: Vance Alvarado PA-C  PCP: Aftab Christensen DO  Admission Date:   Admission Orders (From admission, onward)    Ordered        06/24/19 1042  Inpatient Admission (expected length of stay for this patient Order details is greater than two midnights)  Once             Discharge Date: 06/28/19    Resolved Problems  Date Reviewed: 6/28/2019          Resolved    OMA (acute kidney injury) ([facility] ) 6/28/2019     Resolved by  Vance Alvarado PA-C    Leukocytosis 6/28/2019     Resolved by  Vance Alvarado PA-C    SIRS (systemic inflammatory response syndrome) ([facility] ) 6/28/2019     Resolved by  Vance Alvarado PA-C          Consultations During Hospital Stay:  · Gastroenterology    Procedures Performed:   · Colonoscopy    Significant Findings / Test Results:   Results for Devin Baxter (MRN [id number]) as of 6/28/2019 17:09   Ref   Range 6/28/2019 06:16   Sodium Latest Ref Range: 136 - 145 mmol/L 138   Potassium Latest Ref Range: 3 5 - 5 3 mmol/L 4 1   Chloride Latest Ref Range: 100 - 108 mmol/L 104   CO2 Latest Ref Range: 21 - 32 mmol/L 28   Anion Gap Latest Ref Range: 4 - 13 mmol/L 6   BUN Latest Ref Range: 5 - 25 mg/dL 4 (L)   Creatinine Latest Ref Range: 0 60 - 1 30 mg/dL 1 16   Glucose, Random Latest Ref Range: 65 - 140 mg/dL 114   Calcium Latest Ref Range: 8 3 - 10 1 mg/dL 8 1 (L)   eGFR Latest Units: ml/min/1 73sq m 86   WBC Latest Ref Range: 4 31 - 10 16 Thousand/uL 9 53   Red Blood Cell Count Latest Ref Range: 3 88 - 5 62 Million/uL 4 49   Hemoglobin Latest Ref Range: 12 0 - 17 0 g/dL 13 1   HCT Latest Ref Range: 36 5 - 49 3 % 40 3   MCV Latest Ref Range: 82 - 98 fL 90   MCH Latest Ref Range: 26 8 - 34 3 pg 29 2   MCHC Latest Ref Range: 31 4 - 37 4 g/dL 32 5   RDW Latest Ref Range: 11 6 - 15 1 % 12 8   Platelet Count Latest Ref Range: 149 - 390 Thousands/uL 500 (H)   MPV Latest Ref Range: 8 9 - 12 7 fL 10 0   nRBC Latest Units: /100 WBCs 0   ·     Incidental Findings:   ·      Test Results Pending at Discharge (will require follow up): · Tissue biopsy  · Repeat CRP     Outpatient Tests Requested:  · Gastroenterology referral placed for outpatient follow-up in the 2-4 weeks  · PCP follow-up as needed    Complications:  None    Reason for Admission:  Altered colitis    Hospital Course: Layne Carreon is a 34 y o  male patient who originally presented to the hospital on 6/24/2019 due to abdominal pain, diarrhea, nausea and vomiting  Patient admitted for further workup given lack of improvement on outpatient regimen of Cipro/Flagyl and Bentyl  Gastroenterology consult, patient underwent colonoscopy which showed evidence of most consistent with ulcerative colitis  Started on IV steroids, symptoms slowly improving  Patient has been deemed stable from a GI standpoint for discharge, or regimen of 40 mg prednisone daily until follow-up with Gastroenterology which point they will start taper  Please see above list of diagnoses and related plan for additional information  Condition at Discharge: good     Discharge Day Visit / Exam:     * Please refer to separate progress note for these details *    Discussion with Family:  None present time my evaluation, patient comfortable with discharge plan    Discharge instructions/Information to patient and family:   See after visit summary for information provided to patient and family  Provisions for Follow-Up Care:  See after visit summary for information related to follow-up care and any pertinent home health orders  Disposition:     Home    Planned Readmission:  None     Discharge Statement:  I spent approximately 20 minutes discharging the patient  This time was spent on the day of discharge  I had direct contact with the patient on the day of discharge   Greater than 50% of the total time was spent examining patient, answering all patient questions, arranging and discussing plan of care with patient as well as directly providing post-discharge instructions  Additional time then spent on discharge activities  Discharge Medications:  See after visit summary for reconciled discharge medications provided to patient and family        ** Please Note: This note has been constructed using a voice recognition system **

## 2019-06-28 NOTE — ASSESSMENT & PLAN NOTE
· Symptoms ongoing for 6-8 weeks prior to presentation, associated with 30lb weight loss, cramping abdominal pain, and poor oral intake  No hematochezia  Notes possible family hx of IBD in his grandmother  Treated with bentyl, cipro and flagyl with no improvement in outpatient setting  · Patient underwent colonoscopy which showed findings most consistent with ulcerative colitis    Gastroenterology recommending prednisone 40 mg daily until follow-up  · Will need to discuss with Gastroenterology today regarding transition to oral prednisone and taper course on discharge  · Repeat CRP now, patient can follow up on results with GI in next week  · Chronic hepatitis panel is normal, QuantiFERON TB is pending  · Patient will need to follow up on the tissue biopsy results  · Continue low-fiber diet as tolerated  · Monitor stool output   · Gastroenterology has cleared the patient for discharge

## 2019-06-28 NOTE — PROGRESS NOTES
[facility]  Progress Note - Antonio Guthrie 34 y o  male MRN: [id number]    Unit/Bed#: 423-01 Encounter: [id number]    Assessment/Plan:  73-year-old male without significant past medical history presented to the hospital for abdominal pain, diarrhea, nausea and vomiting and was found to have ulcerative colitis on colonoscopy  1  Ulcerative colitis   -he reports that his symptoms are much improved, he is no longer having nausea or vomiting and he is tolerating a regular diet  His bowel movements are more formed and his stool frequency has decreased  He has not seen further rectal bleeding    -he is currently on Solu-Medrol 15 mg Q 6   -his hemoglobin has stayed stable at 13 1   -chronic hepatitis panel was negative, QuantiFERON gold was indeterminate so he will require PPD, this can be done as an outpatient  TPMT enzyme activity pending    -repeat CRP can be done just prior to discharge, discussed with Dr Tarun Meneses  -he is stable for discharge, he should be discharged on 40 mg prednisone p o  Daily with close follow-up in the office   -discussed plan with Dr Guru Bedolla of [facility]    Subjective:   He reports that he is feeling improved, he states that his bowel movements are more solid any had a formed but soft bowel movement today  His frequency is decreased as well  He is tolerating a regular diet and is eager to return home  He has not seen further rectal bleeding  Objective:     Vitals: Blood pressure 113/57, pulse 67, temperature 97 5 °F (36 4 °C), temperature source Temporal, resp  rate 18, height 6' 1" (1 854 m), weight 91 9 kg (202 lb 9 6 oz), SpO2 98 %  ,Body mass index is 26 73 kg/m²  Intake/Output Summary (Last 24 hours) at 6/28/2019 1615  Last data filed at 6/28/2019 1300  Gross per 24 hour   Intake 2720 ml   Output    Net 2720 ml       Review of Systems: as per HPI  Review of Systems   Constitutional: Positive for unexpected weight change   Negative for activity change, appetite change, chills, fatigue and fever  HENT: Negative for mouth sores, sore throat and trouble swallowing  Respiratory: Negative for shortness of breath  Cardiovascular: Negative for chest pain  Gastrointestinal: Positive for abdominal pain, blood in stool and diarrhea  Negative for abdominal distention, constipation, nausea and vomiting  Skin: Negative for color change, pallor, rash and wound  Neurological: Negative for tremors and syncope  All other systems reviewed and are negative  Physical Exam:     Physical Exam   Constitutional: He is oriented to person, place, and time  He appears well-developed and well-nourished  No distress  HENT:   Head: Normocephalic and atraumatic  Eyes: Right eye exhibits no discharge  Left eye exhibits no discharge  No scleral icterus  Neck: Neck supple  No tracheal deviation present  Cardiovascular: Normal rate, regular rhythm, normal heart sounds and intact distal pulses  Exam reveals no gallop and no friction rub  No murmur heard  Pulmonary/Chest: Effort normal and breath sounds normal  No respiratory distress  He has no wheezes  He has no rales  He exhibits no tenderness  Neurological: He is alert and oriented to person, place, and time  Skin: Skin is warm and dry  Psychiatric: He has a normal mood and affect           Invasive Devices     Peripheral Intravenous Line            Peripheral IV 06/28/19 Right;Upper Arm less than 1 day                        CBC:   Lab Results   Component Value Date    WBC 9 53 06/28/2019    HGB 13 1 06/28/2019    HCT 40 3 06/28/2019    MCV 90 06/28/2019     (H) 06/28/2019    MCH 29 2 06/28/2019    MCHC 32 5 06/28/2019    RDW 12 8 06/28/2019    MPV 10 0 06/28/2019    NRBC 0 06/28/2019   ,   CMP:   Lab Results   Component Value Date    K 4 1 06/28/2019     06/28/2019    CO2 28 06/28/2019    BUN 4 (L) 06/28/2019    CREATININE 1 16 06/28/2019    CALCIUM 8 1 (L) 06/28/2019    EGFR 86 06/28/2019   ,

## 2019-06-28 NOTE — ASSESSMENT & PLAN NOTE
· In the setting of ulcerative colitis with WBC 15 29, tachycardia  · Now resolved    IV antibiotics discontinued  · Consider discontinue IV fluids if stools slow down more today

## 2019-06-28 NOTE — SOCIAL WORK
Continuing to follow pt's medical status  Pt still on IV Steroids, awaiting transition to oral steroids and GI clearance  Plans are home on dc with outpatient follow up  Cm will follow and assist in dc planning

## 2019-06-28 NOTE — NURSING NOTE
AVS reviewed with patient at the bedside  Questions were encouraged and answered  Patient verbalized an understanding of all discharge instructions  Patient was discharged to home in no acute distress

## 2019-06-28 NOTE — ASSESSMENT & PLAN NOTE
· Suspect reactive 2/2 above, continue with Lovenox prophylaxis while hospitalized, patient is ambulatory and without lower extremity edema

## 2019-06-28 NOTE — PROGRESS NOTES
Progress Note - Craig Gutiérrez 1990, 34 y o  male MRN: [id number]    Unit/Bed#: 423-01 Encounter: [id number]    Primary Care Provider: Sherlyn Hawkins DO   Date and time admitted to hospital: 6/24/2019  5:52 AM    * Ulcerative colitis ([facility] )  Assessment & Plan  · Symptoms ongoing for 6-8 weeks prior to presentation, associated with 30lb weight loss, cramping abdominal pain, and poor oral intake  No hematochezia  Notes possible family hx of IBD in his g  Treated with bentyl, cipro and flagyl with no improvement in outpatient setting  · Patient underwent colonoscopy which showed findings most consistent with ulcerative colitis  Gastroenterology has started the patient on IV Solu-Medrol 15 2 mg q 6 hours  · Will need to discuss with Gastroenterology today regarding transition to oral prednisone and taper course on discharge  · Plan for repeat CRP as advised by Gastroenterology tomorrow  · Chronic hepatitis panel is normal, QuantiFERON TB is pending  · Patient will need to follow up on the tissue biopsy results  · Continue low-fiber diet as tolerated  · Monitor stool output     SIRS (systemic inflammatory response syndrome) ([facility] )  Assessment & Plan  · In the setting of ulcerative colitis with WBC 15 29, tachycardia  · Now resolved  IV antibiotics discontinued  · Consider discontinue IV fluids if stools slow down more today    Leukocytosis  Assessment & Plan  · With bandemia, suspect reactive 2/2 primary issue    This is resolved    OMA (acute kidney injury) ([facility] )  Assessment & Plan  · Likely pre renal azotemia secondary to ongoing diarrhea in the setting of inflammatory bowel disorder  · Creatinine 1 48 on admission, resolved to 1 16  · Continue IV fluids for now, if diarrhea continues slow down can discontinue and monitor    Thrombocytosis (HCC)  Assessment & Plan  · Suspect reactive 2/2 above, continue with Lovenox prophylaxis      VTE Pharmacologic Prophylaxis:   Pharmacologic: Enoxaparin (Lovenox)  Mechanical VTE Prophylaxis in Place: No    Patient Centered Rounds: I have performed bedside rounds with nursing staff today  Discussions with Specialists or Other Care Team Provider: will reach out to GI for follow up evaluation/recommendations     Education and Discussions with Family / Patient: patient     Time Spent for Care: 20 minutes  More than 50% of total time spent on counseling and coordination of care as described above  Current Length of Stay: 4 day(s)    Current Patient Status: Inpatient   Certification Statement: The patient will continue to require additional inpatient hospital stay due to await transitition to oral prednisone    Discharge Plan: pending GI clearance     Code Status: Level 1 - Full Code      Subjective:   Patient seen this morning, sitting up in appearing comfortable  He has been ambulatory without difficulty  He did have 2 loose bowel movements today, no sd blood  He did have some cramping, presently resolved  Denies any subjective fevers or chills  Urinating well  He has been tolerating his low-fiber diet  Denies any chest pain, shortness of breath, or palpitations  Objective:     Vitals:   Temp (24hrs), Av 1 °F (36 2 °C), Min:96 5 °F (35 8 °C), Max:97 8 °F (36 6 °C)    Temp:  [96 5 °F (35 8 °C)-97 8 °F (36 6 °C)] 96 5 °F (35 8 °C)  HR:  [57-73] 57  Resp:  [16-18] 17  BP: (112-130)/(70-77) 112/77  SpO2:  [98 %-99 %] 99 %  Body mass index is 26 73 kg/m²  Input and Output Summary (last 24 hours): Intake/Output Summary (Last 24 hours) at 2019 1046  Last data filed at 2019 0845  Gross per 24 hour   Intake 2540 ml   Output    Net 2540 ml       Physical Exam:     Physical Exam   Constitutional: Vital signs are normal  He appears well-developed and well-nourished  No distress  Cardiovascular: Normal rate, regular rhythm, S1 normal, S2 normal, normal heart sounds and intact distal pulses     Pulmonary/Chest: Effort normal and breath sounds normal  No respiratory distress  He has no wheezes  He has no rhonchi  He has no rales  Abdominal: Soft  Bowel sounds are normal  He exhibits no distension  There is no tenderness  There is no guarding  Musculoskeletal: He exhibits no edema  Psychiatric: He has a normal mood and affect  Nursing note and vitals reviewed  Additional Data:     Labs:    Results from last 7 days   Lab Units 06/28/19  0616   WBC Thousand/uL 9 53   HEMOGLOBIN g/dL 13 1   HEMATOCRIT % 40 3   PLATELETS Thousands/uL 500*   NEUTROS PCT % 78*   LYMPHS PCT % 12*   MONOS PCT % 10   EOS PCT % 0     Results from last 7 days   Lab Units 06/28/19  0616 06/27/19  0620   SODIUM mmol/L 138 140   POTASSIUM mmol/L 4 1 4 0   CHLORIDE mmol/L 104 106   CO2 mmol/L 28 30   BUN mg/dL 4* 2*   CREATININE mg/dL 1 16 1 22   ANION GAP mmol/L 6 4   CALCIUM mg/dL 8 1* 8 5   ALBUMIN g/dL  --  2 9*   TOTAL BILIRUBIN mg/dL  --  0 20   ALK PHOS U/L  --  71   ALT U/L  --  32   AST U/L  --  31   GLUCOSE RANDOM mg/dL 114 116     Results from last 7 days   Lab Units 06/24/19  0608   INR  1 21*             Results from last 7 days   Lab Units 06/26/19  0602 06/24/19  1138 06/24/19  0647 06/24/19  7349   LACTIC ACID mmol/L  --   --   --  1 5   PROCALCITONIN ng/ml 0 11 0 08 0 13  --            * I Have Reviewed All Lab Data Listed Above  * Additional Pertinent Lab Tests Reviewed: All Labs Within Last 24 Hours Reviewed    Imaging:    Imaging Reports Reviewed Today Include: colonoscopy  Imaging Personally Reviewed by Myself Includes:      Recent Cultures (last 7 days):     Results from last 7 days   Lab Units 06/24/19 2002 06/24/19  0647 06/24/19  0644   BLOOD CULTURE   --  No Growth at 72 hrs  No Growth at 72 hrs     C DIFF TOXIN B  NEGATIVE for C difficle toxin by PCR    --   --        Last 24 Hours Medication List:     Current Facility-Administered Medications:  acetaminophen 650 mg Oral Q6H PRN Avni Reddy MD    enoxaparin 40 mg Subcutaneous Q24H Jay Helms MD    methylPREDNISolone sodium succinate 15 2 mg Intravenous Q6H Malaika Fink MD    ondansetron 4 mg Intravenous Q4H PRN Jay Helms MD    sodium chloride 100 mL/hr Intravenous Continuous Hardik Caro PA-C Last Rate: 100 mL/hr (06/28/19 1026)   traMADol 50 mg Oral Q6H PRN Jay Helms MD         Today, Patient Was Seen By: Ras Saleh PA-C    ** Please Note: Dictation voice to text software may have been used in the creation of this document   **

## 2019-06-28 NOTE — DISCHARGE INSTRUCTIONS
Ulcerative Colitis   WHAT YOU NEED TO KNOW:   Ulcerative colitis is a chronic disease of the colon (large intestine)  Ulcers (sores) form on the inner lining of your colon and cause bleeding and inflammation  DISCHARGE INSTRUCTIONS:   Seek care immediately if:   · You have sudden trouble breathing  · You have a fast heart rate, fast breathing, or are too dizzy to stand up  · You have severe abdominal pain  · Your vomit has blood in it or looks like coffee grounds  · You see blood in your bowel movement  Contact your healthcare provider if:   · You have a fever, chills, a cough, or feel weak and achy  · You have abdominal pain that does not go away or gets worse after you take medicine  · Your abdomen is swollen  · You lose weight without trying  · You have questions or concerns about your condition or care  Medicines:   · Medicines  may be given to help decrease inflammation or control your immune system  · Take your medicine as directed  Contact your healthcare provider if you think your medicine is not helping or if you have side effects  Tell him or her if you are allergic to any medicine  Keep a list of the medicines, vitamins, and herbs you take  Include the amounts, and when and why you take them  Bring the list or the pill bottles to follow-up visits  Carry your medicine list with you in case of an emergency  Follow up with your healthcare provider as directed:  Keep a written record of your bowel movements  Include the color, form, and if they were bloody  Bring the record to your follow-up visits  Write down your questions so you remember to ask them during your visits  Self-care:   · Eat a variety of healthy foods  This helps you have more energy and heal faster  Healthy foods include fruit, vegetables, whole-grain breads, low-fat dairy products, beans, lean meat, and fish  Do not eat foods that make your symptoms worse   Your healthcare provider may give you vitamins or minerals to improve your nutrition if you have severe ulcerative colitis  · Drink liquids as directed  Adults should drink between 9 and 13 eight-ounce cups of liquid every day  Ask what amount is best for you  For most people, good liquids to drink are water, juice, and milk  Do not drink alcohol  This can make your symptoms worse  · Get more exercise  Talk to your healthcare provider about the best exercise plan for you  Exercise can help prevent constipation, decrease your blood pressure, and improve your health  · Manage stress  Stress may slow healing and cause illness  Learn new ways to relax, such as deep breathing  · Do not take NSAID medicines  NSAIDs can cause flare-ups  Flare-ups are when your symptoms become worse  For more information:   · Oswaldo Alba ([facility]) [address] [place], [place] [address]  Phone: [phone]  Web Address: www digestive  [facility] gov  · [facility], [address] [place] [id number]  Phone: [phone]  Web Address: https://[facility] info/  © 2017 [address] Information is for End User's use only and may not be sold, redistributed or otherwise used for commercial purposes  All illustrations and images included in CareNotes® are the copyrighted property of A D A M , Inc  or Dylon Duran  The above information is an  only  It is not intended as medical advice for individual conditions or treatments  Talk to your doctor, nurse or pharmacist before following any medical regimen to see if it is safe and effective for you

## 2019-06-28 NOTE — ASSESSMENT & PLAN NOTE
· Symptoms ongoing for 6-8 weeks prior to presentation, associated with 30lb weight loss, cramping abdominal pain, and poor oral intake  No hematochezia  Notes possible family hx of IBD in his g  Treated with bentyl, cipro and flagyl with no improvement in outpatient setting  · Patient underwent colonoscopy which showed findings most consistent with ulcerative colitis    Gastroenterology has started the patient on IV Solu-Medrol 15 2 mg q 6 hours  · Will need to discuss with Gastroenterology today regarding transition to oral prednisone and taper course on discharge  · Plan for repeat CRP as advised by Gastroenterology tomorrow  · Chronic hepatitis panel is normal, QuantiFERON TB is pending  · Patient will need to follow up on the tissue biopsy results  · Continue low-fiber diet as tolerated  · Monitor stool output

## 2019-06-28 NOTE — ASSESSMENT & PLAN NOTE
· Likely pre renal azotemia secondary to ongoing diarrhea in the setting of inflammatory bowel disorder  · Creatinine 1 48 on admission, resolved to 1 16  · Continue IV fluids for now, if diarrhea continues slow down can discontinue and monitor

## 2019-06-29 LAB
BACTERIA BLD CULT: NORMAL
HEMOCCULT STL QL: POSITIVE

## 2019-07-01 ENCOUNTER — TELEPHONE (OUTPATIENT)
Dept: GASTROENTEROLOGY | Facility: CLINIC | Age: 29
End: 2019-07-01

## 2019-07-01 ENCOUNTER — TELEPHONE (OUTPATIENT)
Dept: GASTROENTEROLOGY | Facility: HOSPITAL | Age: 29
End: 2019-07-01

## 2019-07-01 LAB
BACTERIA BLD CULT: NORMAL
REF LAB TEST METHOD: NORMAL
TEST INTERPRETATION: NORMAL
TPMT RBC-CCNC: 16.6 UNITS/ML RBC

## 2019-07-01 NOTE — TELEPHONE ENCOUNTER
Pt stopped in for paperwork, pt was very frustrated when he presented to the window, he has been trying to call and has not had any calls back, I did explain the process and patient was happy with conversation, I did get the paperwork from [place] office and give it to the patient, no other questions

## 2019-07-02 ENCOUNTER — TRANSITIONAL CARE MANAGEMENT (OUTPATIENT)
Dept: FAMILY MEDICINE CLINIC | Facility: CLINIC | Age: 29
End: 2019-07-02

## 2019-07-10 ENCOUNTER — OFFICE VISIT (OUTPATIENT)
Dept: FAMILY MEDICINE CLINIC | Facility: CLINIC | Age: 29
End: 2019-07-10
Payer: COMMERCIAL

## 2019-07-10 VITALS
DIASTOLIC BLOOD PRESSURE: 70 MMHG | WEIGHT: 199.8 LBS | BODY MASS INDEX: 26.48 KG/M2 | SYSTOLIC BLOOD PRESSURE: 108 MMHG | HEIGHT: 73 IN

## 2019-07-10 DIAGNOSIS — K51.019 ULCERATIVE PANCOLITIS WITH COMPLICATION (HCC): Primary | ICD-10-CM

## 2019-07-10 DIAGNOSIS — D75.839 THROMBOCYTOSIS: ICD-10-CM

## 2019-07-10 PROCEDURE — 99495 TRANSJ CARE MGMT MOD F2F 14D: CPT | Performed by: FAMILY MEDICINE

## 2019-07-10 NOTE — PROGRESS NOTES
Assessment/Plan:    Problem List Items Addressed This Visit        Digestive    Ulcerative colitis ([facility] ) - Primary    Relevant Orders    Basic metabolic panel    CBC and differential    C-reactive protein       Hematopoietic and Hemostatic    Thrombocytosis (HCC)    Relevant Orders    Basic metabolic panel    CBC and differential    C-reactive protein           Diagnoses and all orders for this visit:    Ulcerative pancolitis with complication ([facility] )  -     Basic metabolic panel  -     CBC and differential  -     C-reactive protein    Thrombocytosis (HCC)  -     Basic metabolic panel  -     CBC and differential  -     C-reactive protein        No problem-specific Assessment & Plan notes found for this encounter  Subjective:      Patient ID: Elizabeth Cooper is a 34 y o  male  HPI    The following portions of the patient's history were reviewed and updated as appropriate:   He has a past medical history of Rhabdomyolysis (2011)  ,  does not have any pertinent problems on file  ,   has no past surgical history on file  ,  family history includes Diabetes in his paternal grandfather; No Known Problems in his father and mother  ,   reports that he has never smoked  He has never used smokeless tobacco  He reports that he drank alcohol  He reports that he does not use drugs  ,  is allergic to dilaudid [hydromorphone]     Current Outpatient Medications   Medication Sig Dispense Refill    predniSONE 20 mg tablet Take 2 tablets (40 mg total) by mouth daily for 30 days 60 tablet 1    traMADol (ULTRAM) 50 mg tablet Take 1 tablet (50 mg total) by mouth every 6 (six) hours as needed for severe pain 12 tablet 0     No current facility-administered medications for this visit  Review of Systems      Objective:  Vitals:    07/10/19 0941   BP: 108/70   Weight: 90 6 kg (199 lb 12 8 oz)   Height: 6' 1" (1 854 m)     Body mass index is 26 36 kg/m²       Physical Exam

## 2019-07-10 NOTE — PROGRESS NOTES
Transition of Care  Follow-up After Hospitalization    [facility] 34 y o  male   Date:  7/10/2019    TCM Call (since 6/9/2019)     Date and time call was made  7/2/2019  3:43 PM    Patient was hospitialized at  [facility]    Date of Admission  06/24/19    Date of discharge  06/28/19    Diagnosis  Ulcerative colitis     Disposition  Home    Were the patients medications reviewed and updated  No    Current Symptoms  -- STILL HAVING SOME CRAMPING      TCM Call (since 6/9/2019)     Post hospital issues  None    Should patient be enrolled in anticoag monitoring? No    Scheduled for follow up? Yes TCM APPT 7/10/19    Patients specialists  Other (comment)    Other specialists names  GI    Did you obtain your prescribed medications  Yes    Do you need help managing your prescriptions or medications  No    Is transportation to your appointment needed  No    I have advised the patient to call PCP with any new or worsening symptoms  [id number]    Support System  Family    The type of support provided  Physical; Emotional    Do you have social support  Yes, as much as I need    Are you recieving any outpatient services  No    Are you recieving home care services  No    Are you using any community resources  No    Current waiver services  No    Have you fallen in the last 12 months  No    Interperter language line needed  No    Counseling  Patient        Hospital records were reviewed  Medications upon discharge reviewed/updated  Discharge Disposition:    Follow up visits with other specialists:       Assessment and Plan:  Patient will continue same medications  Continue to watch diet  Follow up with GI next month  Follow-up here in 3 months  He will get blood work next week  Eliseo Baez was seen today for transition of care management      Diagnoses and all orders for this visit:    Ulcerative pancolitis with complication ([facility] [address] )  -     Basic metabolic panel  -     CBC and differential  -     C-reactive protein    Thrombocytosis (HCC)  -     Basic metabolic panel  -     CBC and differential  -     C-reactive protein            HPI:  Patient here today for OLIVIA  Patient was admitted for ulcerative colitis  Patient was suffering for 2 months prior with diarrhea  Doing much better  Having more solid bowel movements  He is on prednisone 40 mg a day  He will follow up with GI next month  ROS: Review of Systems   Constitutional: Negative  Respiratory: Negative  Cardiovascular: Negative  Gastrointestinal:        As per HPI   Genitourinary: Negative  Past Medical History:   Diagnosis Date    Rhabdomyolysis 2011       No past surgical history on file      Social History     Socioeconomic History    Marital status: Single     Spouse name: None    Number of children: None    Years of education: None    Highest education level: None   Occupational History    None   Social Needs    Financial resource strain: None    Food insecurity:     Worry: None     Inability: None    Transportation needs:     Medical: None     Non-medical: None   Tobacco Use    Smoking status: Never Smoker    Smokeless tobacco: Never Used   Substance and Sexual Activity    Alcohol use: Not Currently     Frequency: Monthly or less     Drinks per session: 1 or 2    Drug use: Never    Sexual activity: Not Currently   Lifestyle    Physical activity:     Days per week: None     Minutes per session: None    Stress: None   Relationships    Social connections:     Talks on phone: None     Gets together: None     Attends Restorationist service: None     Active member of club or organization: None     Attends meetings of clubs or organizations: None     Relationship status: None    Intimate partner violence:     Fear of current or ex partner: None     Emotionally abused: None     Physically abused: None     Forced sexual activity: None   Other Topics Concern    None   Social History Narrative  None       Family History   Problem Relation Age of Onset    No Known Problems Mother     No Known Problems Father     Diabetes Paternal Grandfather        Allergies   Allergen Reactions    Dilaudid [Hydromorphone]      Chest pain/pressure         Current Outpatient Medications:     predniSONE 20 mg tablet, Take 2 tablets (40 mg total) by mouth daily for 30 days, Disp: 60 tablet, Rfl: 1    traMADol (ULTRAM) 50 mg tablet, Take 1 tablet (50 mg total) by mouth every 6 (six) hours as needed for severe pain, Disp: 12 tablet, Rfl: 0      Physical Exam:  /70   Ht 6' 1" (1 854 m)   Wt 90 6 kg (199 lb 12 8 oz)   BMI 26 36 kg/m²     Physical Exam   Constitutional: He is oriented to person, place, and time  He appears well-developed and well-nourished  No distress  HENT:   Head: Normocephalic and atraumatic  Eyes: Conjunctivae are normal    Cardiovascular: Normal rate, regular rhythm and normal heart sounds  Exam reveals no gallop and no friction rub  No murmur heard  Pulmonary/Chest: Effort normal and breath sounds normal  No respiratory distress  He has no wheezes  He has no rales  Musculoskeletal: He exhibits no edema  Neurological: He is alert and oriented to person, place, and time  Skin: He is not diaphoretic  Psychiatric: He has a normal mood and affect  His behavior is normal  Judgment and thought content normal    Vitals reviewed            Labs:  Lab Results   Component Value Date    WBC 9 53 06/28/2019    HGB 13 1 06/28/2019    HCT 40 3 06/28/2019    MCV 90 06/28/2019     (H) 06/28/2019     Lab Results   Component Value Date     03/31/2015    K 4 1 06/28/2019     06/28/2019    CO2 28 06/28/2019    ANIONGAP 9 03/31/2015    BUN 4 (L) 06/28/2019    CREATININE 1 16 06/28/2019    GLUCOSE 93 03/31/2015    CALCIUM 8 1 (L) 06/28/2019    AST 31 06/27/2019    ALT 32 06/27/2019    ALKPHOS 71 06/27/2019    PROT 7 4 03/31/2015    BILITOT 0 6 03/31/2015    EGFR 86 06/28/2019

## 2019-07-22 ENCOUNTER — TELEPHONE (OUTPATIENT)
Dept: FAMILY MEDICINE CLINIC | Facility: CLINIC | Age: 29
End: 2019-07-22

## 2019-07-22 NOTE — TELEPHONE ENCOUNTER
Would like a letter for disability say ing why he could not go back to work for 2 weeks after d/c he lost 30 pounds he was so dehydrated and still had diarrhea  They meed his treatment and care  His request was faxed 2 times now and we still do not have  Patient very angry that paperwork is not here

## 2019-07-23 ENCOUNTER — TELEPHONE (OUTPATIENT)
Dept: FAMILY MEDICINE CLINIC | Facility: CLINIC | Age: 29
End: 2019-07-23

## 2019-07-25 ENCOUNTER — APPOINTMENT (OUTPATIENT)
Dept: LAB | Facility: MEDICAL CENTER | Age: 29
End: 2019-07-25
Payer: COMMERCIAL

## 2019-07-25 LAB
ANION GAP SERPL CALCULATED.3IONS-SCNC: 3 MMOL/L (ref 4–13)
BASOPHILS # BLD AUTO: 0.05 THOUSANDS/ΜL (ref 0–0.1)
BASOPHILS NFR BLD AUTO: 1 % (ref 0–1)
BUN SERPL-MCNC: 13 MG/DL (ref 5–25)
CALCIUM SERPL-MCNC: 8.4 MG/DL (ref 8.3–10.1)
CHLORIDE SERPL-SCNC: 107 MMOL/L (ref 100–108)
CO2 SERPL-SCNC: 29 MMOL/L (ref 21–32)
CREAT SERPL-MCNC: 1.21 MG/DL (ref 0.6–1.3)
CRP SERPL QL: <3 MG/L
EOSINOPHIL # BLD AUTO: 0.13 THOUSAND/ΜL (ref 0–0.61)
EOSINOPHIL NFR BLD AUTO: 2 % (ref 0–6)
ERYTHROCYTE [DISTWIDTH] IN BLOOD BY AUTOMATED COUNT: 13.7 % (ref 11.6–15.1)
GFR SERPL CREATININE-BSD FRML MDRD: 82 ML/MIN/1.73SQ M
GLUCOSE SERPL-MCNC: 97 MG/DL (ref 65–140)
HCT VFR BLD AUTO: 40.3 % (ref 36.5–49.3)
HGB BLD-MCNC: 12.7 G/DL (ref 12–17)
IMM GRANULOCYTES # BLD AUTO: 0.03 THOUSAND/UL (ref 0–0.2)
IMM GRANULOCYTES NFR BLD AUTO: 0 % (ref 0–2)
LYMPHOCYTES # BLD AUTO: 1.64 THOUSANDS/ΜL (ref 0.6–4.47)
LYMPHOCYTES NFR BLD AUTO: 20 % (ref 14–44)
MCH RBC QN AUTO: 28.9 PG (ref 26.8–34.3)
MCHC RBC AUTO-ENTMCNC: 31.5 G/DL (ref 31.4–37.4)
MCV RBC AUTO: 92 FL (ref 82–98)
MONOCYTES # BLD AUTO: 0.79 THOUSAND/ΜL (ref 0.17–1.22)
MONOCYTES NFR BLD AUTO: 10 % (ref 4–12)
NEUTROPHILS # BLD AUTO: 5.63 THOUSANDS/ΜL (ref 1.85–7.62)
NEUTS SEG NFR BLD AUTO: 67 % (ref 43–75)
NRBC BLD AUTO-RTO: 0 /100 WBCS
PLATELET # BLD AUTO: 250 THOUSANDS/UL (ref 149–390)
PMV BLD AUTO: 10.8 FL (ref 8.9–12.7)
POTASSIUM SERPL-SCNC: 3.3 MMOL/L (ref 3.5–5.3)
RBC # BLD AUTO: 4.39 MILLION/UL (ref 3.88–5.62)
SODIUM SERPL-SCNC: 139 MMOL/L (ref 136–145)
WBC # BLD AUTO: 8.27 THOUSAND/UL (ref 4.31–10.16)

## 2019-07-25 PROCEDURE — 85025 COMPLETE CBC W/AUTO DIFF WBC: CPT | Performed by: FAMILY MEDICINE

## 2019-07-25 PROCEDURE — 80048 BASIC METABOLIC PNL TOTAL CA: CPT | Performed by: FAMILY MEDICINE

## 2019-07-25 PROCEDURE — 36415 COLL VENOUS BLD VENIPUNCTURE: CPT | Performed by: FAMILY MEDICINE

## 2019-07-25 PROCEDURE — 86140 C-REACTIVE PROTEIN: CPT | Performed by: FAMILY MEDICINE

## 2019-07-26 DIAGNOSIS — K51.019 ULCERATIVE PANCOLITIS WITH COMPLICATION (HCC): Primary | ICD-10-CM

## 2019-07-26 RX ORDER — POTASSIUM CHLORIDE 750 MG/1
10 CAPSULE, EXTENDED RELEASE ORAL DAILY
Qty: 30 CAPSULE | Refills: 3 | Status: SHIPPED | OUTPATIENT
Start: 2019-07-26 | End: 2020-08-20 | Stop reason: ALTCHOICE

## 2019-08-20 ENCOUNTER — OFFICE VISIT (OUTPATIENT)
Dept: GASTROENTEROLOGY | Facility: HOSPITAL | Age: 29
End: 2019-08-20
Payer: COMMERCIAL

## 2019-08-20 VITALS
DIASTOLIC BLOOD PRESSURE: 78 MMHG | TEMPERATURE: 98.3 F | SYSTOLIC BLOOD PRESSURE: 152 MMHG | HEIGHT: 73 IN | HEART RATE: 91 BPM | WEIGHT: 209 LBS | BODY MASS INDEX: 27.7 KG/M2

## 2019-08-20 DIAGNOSIS — K51.911 ULCERATIVE COLITIS WITH RECTAL BLEEDING, UNSPECIFIED LOCATION (HCC): Primary | ICD-10-CM

## 2019-08-20 PROCEDURE — 99244 OFF/OP CNSLTJ NEW/EST MOD 40: CPT | Performed by: INTERNAL MEDICINE

## 2019-08-20 NOTE — PROGRESS NOTES
[facility] - Outpatient Consultation  Lata Pastrana 34 y o  male MRN: [id number]  Encounter: [id number]          ASSESSMENT AND PLAN:    Lata Pastrana is a 34 y o  male who presents with complaint of new diagnosis of UC  Symptoms improved on prednisone, but he needs to be on additional therapy  We briefly disucssed Humira and Remicade, but the patient had to leave for work and was unable to complete the conversation  Recent colonoscopy, path report, CT scan and blood work reviewed  1  Ulcerative colitis with rectal bleeding, unspecified [facility] )        No orders of the defined types were placed in this encounter  -- Need to repeat Quant gold, CRP, fecal calprotectin  -- Will need to continue conversation of Remicade vs Humira in the near future  -- Ideally will wean prednisone as soon as possible, but need to clarify with the patient next steps in treatment  -- Further recs to follow in the future at follow-up  ______________________________________________________________________    HPI:    Lata Pastrana is a 34 y o  male who presents with complaint of recent admission with new diagnosis of UC  He was admitted to the hospital in June with abdominal pain, diarrhea and weight loss  Colonosocpy performed and consistent with new diagnosis of UC  He was started on Prednisone 40mg and discharged  First 2 weeks out of the hospital was rough  He lost weight and he was tired  He lost 30 lbs  He felt generally weak  He is now doing better, 95% back to baseline  He continues to take prednisone  He does not take the Tramadol  He has gas in the morning when he goes to the bathroom  Also with slime/mucous  2 BMs per day, formed  No dark stools or BRBPR  No abdominal pain, but occasionally he can feel the stool moving though his abdomen  He does have occasional lower abdominal cramps  No longer having severe urgency, but now can hold it in for 15 minutes       No significant heartburn  No dysphagia, odynophagia  No mouth sores, fevers, chills  He has some sweats  Broke out with acne  Some red dots taht he attributes to heat rash  No joint pains  REVIEW OF SYSTEMS:  10 point ROS reviewed and negative, except as above    Historical Information   Past Medical History:   Diagnosis Date    Rhabdomyolysis 2011     History reviewed  No pertinent surgical history  Social History   Social History     Substance and Sexual Activity   Alcohol Use Not Currently    Frequency: Monthly or less    Drinks per session: 1 or 2     Social History     Substance and Sexual Activity   Drug Use Never     Social History     Tobacco Use   Smoking Status Never Smoker   Smokeless Tobacco Never Used     Family History   Problem Relation Age of Onset    No Known Problems Mother     No Known Problems Father     Diabetes Paternal Grandfather        Meds/Allergies       Current Outpatient Medications:     potassium chloride (MICRO-K) 10 MEQ CR capsule    traMADol (ULTRAM) 50 mg tablet    Allergies   Allergen Reactions    Dilaudid [Hydromorphone]      Chest pain/pressure           Objective     Blood pressure 152/78, pulse 91, temperature 98 3 °F (36 8 °C), temperature source Tympanic, height 6' 1" (1 854 m), weight 94 8 kg (209 lb)  Body mass index is 27 57 kg/m²  PHYSICAL EXAM:      General Appearance:   Alert, cooperative, no distress   HEENT:   Normocephalic, atraumatic, anicteric  Neck:  Supple, symmetrical, trachea midline   Lungs:   Clear to auscultation bilaterally; no rales, rhonchi or wheezing; respirations unlabored    Heart[de-identified]   Regular rate and rhythm; no murmur, rub, or gallop     Abdomen:   Soft, non-tender, non-distended; normal bowel sounds; no masses, no organomegaly    Genitalia:   No testicular masses    Rectal:   Deferred    Extremities:  No cyanosis, clubbing or edema    Pulses:  2+ and symmetric    Skin:  No jaundice, rashes, or lesions    Lymph nodes:  No palpable cervical, submandibular, occipital, axillary lymphadenopathy  Small right inguinal lymph node, physiologic in size  Lab Results:   No visits with results within 1 Day(s) from this visit  Latest known visit with results is:   Office Visit on 07/10/2019   Component Date Value    Sodium 07/25/2019 139     Potassium 07/25/2019 3 3*    Chloride 07/25/2019 107     CO2 07/25/2019 29     ANION GAP 07/25/2019 3*    BUN 07/25/2019 13     Creatinine 07/25/2019 1 21     Glucose 07/25/2019 97     Calcium 07/25/2019 8 4     eGFR 07/25/2019 82     WBC 07/25/2019 8 27     RBC 07/25/2019 4 39     Hemoglobin 07/25/2019 12 7     Hematocrit 07/25/2019 40 3     MCV 07/25/2019 92     MCH 07/25/2019 28 9     MCHC 07/25/2019 31 5     RDW 07/25/2019 13 7     MPV 07/25/2019 10 8     Platelets 55/77/0831 250     nRBC 07/25/2019 0     Neutrophils Relative 07/25/2019 67     Immat GRANS % 07/25/2019 0     Lymphocytes Relative 07/25/2019 20     Monocytes Relative 07/25/2019 10     Eosinophils Relative 07/25/2019 2     Basophils Relative 07/25/2019 1     Neutrophils Absolute 07/25/2019 5 63     Immature Grans Absolute 07/25/2019 0 03     Lymphocytes Absolute 07/25/2019 1 64     Monocytes Absolute 07/25/2019 0 79     Eosinophils Absolute 07/25/2019 0 13     Basophils Absolute 07/25/2019 0 05     CRP 07/25/2019 <3 0      Quant Gold: Indeterminate  Hep B core: Non-reactive  Radiology Results:   CT (6/24/2019): Pancolitis again demonstrated with wall thickening and adjacent fat stranding predominantly involving the transverse, left and rectosigmoid colon    This appears slightly improved from the prior exam   Continued follow-up to resolution suggested

## 2019-08-28 ENCOUNTER — TELEPHONE (OUTPATIENT)
Dept: GASTROENTEROLOGY | Facility: CLINIC | Age: 29
End: 2019-08-28

## 2019-08-28 DIAGNOSIS — K51.019 ULCERATIVE PANCOLITIS WITH COMPLICATION (HCC): Primary | ICD-10-CM

## 2019-08-28 RX ORDER — PREDNISONE 10 MG/1
40 TABLET ORAL DAILY
Qty: 400 TABLET | Refills: 0 | Status: SHIPPED | OUTPATIENT
Start: 2019-08-28 | End: 2020-08-20 | Stop reason: ALTCHOICE

## 2019-08-28 NOTE — TELEPHONE ENCOUNTER
Dr Aliyah Jay pt called stating he had to cut his visit short and wanted to speak to someone to discuss labs and medications  Pt also states that he only has 1 day of prednisone left and will need a refill   Pt can be reached at [phone]

## 2019-08-30 NOTE — TELEPHONE ENCOUNTER
Pt called requesting to speak to Dr Annah Goldberg regarding his transfusion and the wait to get medical help  Pt also mention a blood order for his next office visit

## 2019-09-10 NOTE — TELEPHONE ENCOUNTER
Dr Corinne Meek pt called again wanting to discuss his last visit  Pt states he was supposed to get blood work does not know which ones  Pt also states he needing to discuss possibly getting an infussion   Pt would like to be called and can be reached at [phone]

## 2019-09-13 NOTE — TELEPHONE ENCOUNTER
Called the patient but no answer  Voice message left for the patient asking him to anne marie back with the best time he could be reached

## 2019-09-25 ENCOUNTER — TELEPHONE (OUTPATIENT)
Dept: GASTROENTEROLOGY | Facility: AMBULARY SURGERY CENTER | Age: 29
End: 2019-09-25

## 2019-09-25 DIAGNOSIS — K51.019 ULCERATIVE PANCOLITIS WITH COMPLICATION (HCC): Primary | ICD-10-CM

## 2019-09-25 NOTE — TELEPHONE ENCOUNTER
[facility] PT    Pt returning missed call from dr Rosea Lennox is only available before 2:15pm

## 2019-10-10 NOTE — TELEPHONE ENCOUNTER
Called and spoke with the patient  We discussed the different treatment options and will plan to start Humira  Additionally, we will try to wean the prednisone by the daily dose of 5mg every week  We will also repeat labs given the indeterminate quant gold (will order quant gold, CBC, CMP, CRP)    Reddy Aquino,  Can we get him on Humira?     Thank you

## 2019-10-10 NOTE — TELEPHONE ENCOUNTER
Called to speak with the patient  Called both the home and cell phone numbers but no one picked up   Left a voice message on both numbers

## 2019-10-15 ENCOUNTER — APPOINTMENT (OUTPATIENT)
Dept: LAB | Facility: MEDICAL CENTER | Age: 29
End: 2019-10-15
Payer: COMMERCIAL

## 2019-10-15 ENCOUNTER — TRANSCRIBE ORDERS (OUTPATIENT)
Dept: ADMINISTRATIVE | Facility: HOSPITAL | Age: 29
End: 2019-10-15

## 2019-10-15 DIAGNOSIS — K51.019 ULCERATIVE PANCOLITIS WITH COMPLICATION (HCC): ICD-10-CM

## 2019-10-15 LAB
ALBUMIN SERPL BCP-MCNC: 3.9 G/DL (ref 3.5–5)
ALP SERPL-CCNC: 83 U/L (ref 46–116)
ALT SERPL W P-5'-P-CCNC: 24 U/L (ref 12–78)
ANION GAP SERPL CALCULATED.3IONS-SCNC: 5 MMOL/L (ref 4–13)
AST SERPL W P-5'-P-CCNC: 7 U/L (ref 5–45)
BASOPHILS # BLD AUTO: 0.06 THOUSANDS/ΜL (ref 0–0.1)
BASOPHILS NFR BLD AUTO: 1 % (ref 0–1)
BILIRUB SERPL-MCNC: 0.53 MG/DL (ref 0.2–1)
BUN SERPL-MCNC: 15 MG/DL (ref 5–25)
CALCIUM SERPL-MCNC: 9.2 MG/DL (ref 8.3–10.1)
CHLORIDE SERPL-SCNC: 106 MMOL/L (ref 100–108)
CO2 SERPL-SCNC: 29 MMOL/L (ref 21–32)
CREAT SERPL-MCNC: 1.29 MG/DL (ref 0.6–1.3)
CRP SERPL QL: <3 MG/L
EOSINOPHIL # BLD AUTO: 0.1 THOUSAND/ΜL (ref 0–0.61)
EOSINOPHIL NFR BLD AUTO: 1 % (ref 0–6)
ERYTHROCYTE [DISTWIDTH] IN BLOOD BY AUTOMATED COUNT: 13.6 % (ref 11.6–15.1)
GFR SERPL CREATININE-BSD FRML MDRD: 75 ML/MIN/1.73SQ M
GLUCOSE P FAST SERPL-MCNC: 78 MG/DL (ref 65–99)
HCT VFR BLD AUTO: 46.8 % (ref 36.5–49.3)
HGB BLD-MCNC: 15.1 G/DL (ref 12–17)
IMM GRANULOCYTES # BLD AUTO: 0.06 THOUSAND/UL (ref 0–0.2)
IMM GRANULOCYTES NFR BLD AUTO: 1 % (ref 0–2)
LYMPHOCYTES # BLD AUTO: 2.29 THOUSANDS/ΜL (ref 0.6–4.47)
LYMPHOCYTES NFR BLD AUTO: 25 % (ref 14–44)
MCH RBC QN AUTO: 27.8 PG (ref 26.8–34.3)
MCHC RBC AUTO-ENTMCNC: 32.3 G/DL (ref 31.4–37.4)
MCV RBC AUTO: 86 FL (ref 82–98)
MONOCYTES # BLD AUTO: 0.84 THOUSAND/ΜL (ref 0.17–1.22)
MONOCYTES NFR BLD AUTO: 9 % (ref 4–12)
NEUTROPHILS # BLD AUTO: 5.96 THOUSANDS/ΜL (ref 1.85–7.62)
NEUTS SEG NFR BLD AUTO: 63 % (ref 43–75)
NRBC BLD AUTO-RTO: 0 /100 WBCS
PLATELET # BLD AUTO: 273 THOUSANDS/UL (ref 149–390)
PMV BLD AUTO: 11.4 FL (ref 8.9–12.7)
POTASSIUM SERPL-SCNC: 3.3 MMOL/L (ref 3.5–5.3)
PROT SERPL-MCNC: 7.3 G/DL (ref 6.4–8.2)
RBC # BLD AUTO: 5.43 MILLION/UL (ref 3.88–5.62)
SODIUM SERPL-SCNC: 140 MMOL/L (ref 136–145)
WBC # BLD AUTO: 9.31 THOUSAND/UL (ref 4.31–10.16)

## 2019-10-15 PROCEDURE — 85025 COMPLETE CBC W/AUTO DIFF WBC: CPT

## 2019-10-15 PROCEDURE — 80053 COMPREHEN METABOLIC PANEL: CPT

## 2019-10-15 PROCEDURE — 86140 C-REACTIVE PROTEIN: CPT

## 2019-10-15 PROCEDURE — 36415 COLL VENOUS BLD VENIPUNCTURE: CPT

## 2019-10-15 PROCEDURE — 86480 TB TEST CELL IMMUN MEASURE: CPT

## 2019-10-17 LAB
GAMMA INTERFERON BACKGROUND BLD IA-ACNC: 0.03 IU/ML
M TB IFN-G BLD-IMP: NEGATIVE
M TB IFN-G CD4+ BCKGRND COR BLD-ACNC: -0.01 IU/ML
M TB IFN-G CD4+ BCKGRND COR BLD-ACNC: 0.05 IU/ML
MITOGEN IGNF BCKGRD COR BLD-ACNC: >10 IU/ML

## 2019-12-06 ENCOUNTER — TELEPHONE (OUTPATIENT)
Dept: GASTROENTEROLOGY | Facility: CLINIC | Age: 29
End: 2019-12-06

## 2019-12-06 DIAGNOSIS — K51.019 ULCERATIVE PANCOLITIS WITH COMPLICATION (HCC): Primary | ICD-10-CM

## 2019-12-06 RX ORDER — MESALAMINE 4 G/60ML
4 ENEMA RECTAL
Qty: 60 ML | Refills: 12 | Status: SHIPPED | OUTPATIENT
Start: 2019-12-06 | End: 2021-06-23 | Stop reason: HOSPADM

## 2019-12-06 NOTE — TELEPHONE ENCOUNTER
Discussed everything with patient  He will get the Rowasa and give it a try  He will call if his symptoms get any worse  He hopes to start the Humira soon  He also understand a request has been sent to begin the pre-authorization for the Humira  Labs will be done soon

## 2019-12-06 NOTE — TELEPHONE ENCOUNTER
History of: ulcerative pancolitis, rectal bleeding, thrombocytosis  Judith is calling in to report that he will be starting his final dose of tapering his prednisone of 5 mg on Sunday  He is not sure what is happening from here  On 10/10 he discussed Humira with Dr Makayla Liu, but not sure if anything is confirmed  He does not want to go without taking medication for his UC  He feels he is doing well and eating well  His only concern is he has urgency and his rectum is painful before he has a BM which is a formed stool with some mucus  Please advise about a care plan

## 2019-12-06 NOTE — TELEPHONE ENCOUNTER
Patients GI provider:  Dr Bright Dean     Number to return call: [phone]    Reason for call: Pt calling to check on what's his next step regarding his care and weaning off of his medication   Please advise     Scheduled procedure/appointment date if applicable: Apt/procedure - n/a

## 2019-12-06 NOTE — TELEPHONE ENCOUNTER
It looks like Humira is the plan for him  I will send message to the clinical team to check the status of his humira  We can try rowasa enemas for his symptoms of urgency/rectal pain  If his symptoms worsen, he should let us know and we can refill his prednisone to continue a taper until he starts humira   His tb test was negative but would like him to have more blood work for hep B prior to starting humira    Clinical team-please check status/start auth of this patient's Humira     Thank you

## 2019-12-17 ENCOUNTER — TELEPHONE (OUTPATIENT)
Dept: GASTROENTEROLOGY | Facility: MEDICAL CENTER | Age: 29
End: 2019-12-17

## 2019-12-17 NOTE — TELEPHONE ENCOUNTER
I called [facility] to start a verbal prior auth  Spoke to Georgina  who stated prior Conchita Sánchez was not needed for Humira  Reference# [id number]  I called [facility] to give a verbal script for Humira starter kit and maintenance  Spoke to Livan the pharmacist who took the script for Humira starter kit: 3- 80mg/0 8mL pens and maintenance dose 40mg/0 4mL every 14 days  She stated patient was in the system so they will contact the patient once they are ready to deliver the medication  I called the patient and LMOM informing him that he should he getting a call from the [place] to ship the medication and he can give us a call with any questions

## 2019-12-17 NOTE — TELEPHONE ENCOUNTER
I called [facility] to start a verbal prior auth  Spoke to Georgina  who stated prior Werner Leroy was not needed for Humira  Reference# [id number]  I called [facility] to give a verbal script for Humira starter kit and maintenance  Spoke to Vita Patten the pharmacist who took the script for Humira starter kit: 3- 80mg/0 8mL pens and maintenance dose 40mg/0 4mL every 14 days  She stated patient was in the system so they will contact the patient once they are ready to deliver the medication  I called the patient and LMOM informing him that he should he getting a call from the [place] to ship the medication and he can give us a call with any questions

## 2020-02-04 NOTE — TELEPHONE ENCOUNTER
Patients GI provider:  Dr Celis Said    Number to return call: [phone]    Reason for call: Pt calling to get the status of humira approval  Patient would like a call back before 2:45 pm today       Scheduled procedure/appointment date if applicable: Apt/procedure n/a

## 2020-03-22 ENCOUNTER — OFFICE VISIT (OUTPATIENT)
Dept: URGENT CARE | Facility: CLINIC | Age: 30
End: 2020-03-22
Payer: COMMERCIAL

## 2020-03-22 VITALS
SYSTOLIC BLOOD PRESSURE: 135 MMHG | DIASTOLIC BLOOD PRESSURE: 71 MMHG | OXYGEN SATURATION: 98 % | HEART RATE: 76 BPM | TEMPERATURE: 97.5 F | RESPIRATION RATE: 18 BRPM

## 2020-03-22 DIAGNOSIS — L03.011 PARONYCHIA OF FINGER OF RIGHT HAND: Primary | ICD-10-CM

## 2020-03-22 PROCEDURE — 99213 OFFICE O/P EST LOW 20 MIN: CPT | Performed by: PHYSICIAN ASSISTANT

## 2020-03-22 RX ORDER — AMOXICILLIN AND CLAVULANATE POTASSIUM 875; 125 MG/1; MG/1
1 TABLET, FILM COATED ORAL EVERY 12 HOURS SCHEDULED
Qty: 20 TABLET | Refills: 0 | Status: SHIPPED | OUTPATIENT
Start: 2020-03-22 | End: 2020-04-01

## 2020-03-22 RX ORDER — ADALIMUMAB 80MG/0.8ML
KIT SUBCUTANEOUS
COMMUNITY
Start: 2020-02-11 | End: 2020-08-20

## 2020-03-22 NOTE — PROGRESS NOTES
[facility]    NAME: Dave  is a 31 y o  male  : 1990    MRN: [id number]  DATE: 2020  TIME: 4:56 PM    Assessment and Plan   Paronychia of finger of right hand [L03 011]  1  Paronychia of finger of right hand  amoxicillin-clavulanate (AUGMENTIN) 875-125 mg per tablet       Patient Instructions     Patient Instructions   Start antibiotic and take as directed  Soak finger in warm water with epsom salts 3 times a day for 15-20 minutes  Chief Complaint     Chief Complaint   Patient presents with    Infection     Pt c/o an infection on his right 4th finger for five days  History of Present Illness   46-year-old male here with complaint of infection of the area around his right 4th finger nail  States that he had hangnail which he pulled out and he thinks it was ingrown  Area is now red, inflamed and tender  Skin around the nail has been sloughing  Review of Systems   Review of Systems   Constitutional: Negative for chills and fever  HENT: Negative for congestion  Respiratory: Negative for cough and shortness of breath  Skin: Positive for wound (infection around right 4th fingernail)         Current Medications     Current Outpatient Medications:     amoxicillin-clavulanate (AUGMENTIN) 875-125 mg per tablet, Take 1 tablet by mouth every 12 (twelve) hours for 10 days, Disp: 20 tablet, Rfl: 0    HUMIRA PEN-CD/UC/HS STARTER 80 MG/0 8ML PNKT, , Disp: , Rfl:     mesalamine (ROWASA) 4 g, Insert 60 mL (4 g total) into the rectum daily at bedtime, Disp: 60 mL, Rfl: 12    potassium chloride (MICRO-K) 10 MEQ CR capsule, Take 1 capsule (10 mEq total) by mouth daily (Patient not taking: Reported on 3/22/2020), Disp: 30 capsule, Rfl: 3    predniSONE 10 mg tablet, Take 4 tablets (40 mg total) by mouth daily (Patient not taking: Reported on 3/22/2020), Disp: 400 tablet, Rfl: 0    traMADol (ULTRAM) 50 mg tablet, Take 1 tablet (50 mg total) by mouth every 6 (six) hours as needed for severe pain (Patient not taking: Reported on 3/22/2020), Disp: 12 tablet, Rfl: 0    Current Allergies     Allergies as of 03/22/2020 - Reviewed 03/22/2020   Allergen Reaction Noted    Dilaudid [hydromorphone]  06/24/2019          The following portions of the patient's history were reviewed and updated as appropriate: allergies, current medications, past family history, past medical history, past social history, past surgical history and problem list    Past Medical History:   Diagnosis Date    Rhabdomyolysis 2011     History reviewed  No pertinent surgical history    Family History   Problem Relation Age of Onset    No Known Problems Mother     No Known Problems Father     Diabetes Paternal Grandfather      Social History     Socioeconomic History    Marital status: Single     Spouse name: Not on file    Number of children: Not on file    Years of education: Not on file    Highest education level: Not on file   Occupational History    Not on file   Social Needs    Financial resource strain: Not on file    Food insecurity:     Worry: Not on file     Inability: Not on file    Transportation needs:     Medical: Not on file     Non-medical: Not on file   Tobacco Use    Smoking status: Never Smoker    Smokeless tobacco: Never Used   Substance and Sexual Activity    Alcohol use: Not Currently     Frequency: Monthly or less     Drinks per session: 1 or 2    Drug use: Never    Sexual activity: Not Currently   Lifestyle    Physical activity:     Days per week: Not on file     Minutes per session: Not on file    Stress: Not on file   Relationships    Social connections:     Talks on phone: Not on file     Gets together: Not on file     Attends Pentecostal service: Not on file     Active member of club or organization: Not on file     Attends meetings of clubs or organizations: Not on file     Relationship status: Not on file    Intimate partner violence:     Fear of current or ex partner: Not on file     Emotionally abused: Not on file     Physically abused: Not on file     Forced sexual activity: Not on file   Other Topics Concern    Not on file   Social History Narrative    Not on file     Medications have been verified  Objective   /71   Pulse 76   Temp 97 5 °F (36 4 °C)   Resp 18   SpO2 98%      Physical Exam   Physical Exam   Constitutional: He appears well-developed and well-nourished  No distress  Cardiovascular: Normal rate, regular rhythm, normal heart sounds and intact distal pulses  No murmur heard  Pulmonary/Chest: Effort normal and breath sounds normal  No respiratory distress  Musculoskeletal:        Hands:  Nursing note and vitals reviewed

## 2020-03-22 NOTE — PATIENT INSTRUCTIONS
Start antibiotic and take as directed  Soak finger in warm water with epsom salts 3 times a day for 15-20 minutes

## 2020-03-30 ENCOUNTER — TELEPHONE (OUTPATIENT)
Dept: GASTROENTEROLOGY | Facility: MEDICAL CENTER | Age: 30
End: 2020-03-30

## 2020-03-30 NOTE — TELEPHONE ENCOUNTER
Called patient in regards to his OV with Dr Celis Said scheduled for 4/1/2020 at 8am  We would like to switch this appointment to Virtual  Left voicemail to give the office a call back

## 2020-04-01 ENCOUNTER — OFFICE VISIT (OUTPATIENT)
Dept: GASTROENTEROLOGY | Facility: MEDICAL CENTER | Age: 30
End: 2020-04-01
Payer: COMMERCIAL

## 2020-04-01 VITALS
BODY MASS INDEX: 28.18 KG/M2 | SYSTOLIC BLOOD PRESSURE: 132 MMHG | DIASTOLIC BLOOD PRESSURE: 78 MMHG | WEIGHT: 212.6 LBS | TEMPERATURE: 97.6 F | HEART RATE: 78 BPM | HEIGHT: 73 IN

## 2020-04-01 DIAGNOSIS — K51.019 ULCERATIVE PANCOLITIS WITH COMPLICATION (HCC): Primary | ICD-10-CM

## 2020-04-01 DIAGNOSIS — D84.9 IMMUNOCOMPROMISED STATE (HCC): ICD-10-CM

## 2020-04-01 DIAGNOSIS — R59.0 LYMPHADENOPATHY, INGUINAL: ICD-10-CM

## 2020-04-01 PROCEDURE — 1036F TOBACCO NON-USER: CPT | Performed by: INTERNAL MEDICINE

## 2020-04-01 PROCEDURE — 3008F BODY MASS INDEX DOCD: CPT | Performed by: INTERNAL MEDICINE

## 2020-04-01 PROCEDURE — 99215 OFFICE O/P EST HI 40 MIN: CPT | Performed by: INTERNAL MEDICINE

## 2020-06-23 ENCOUNTER — APPOINTMENT (OUTPATIENT)
Dept: LAB | Facility: MEDICAL CENTER | Age: 30
End: 2020-06-23
Payer: COMMERCIAL

## 2020-06-23 DIAGNOSIS — K51.019 ULCERATIVE PANCOLITIS WITH COMPLICATION (HCC): ICD-10-CM

## 2020-06-23 LAB
ALBUMIN SERPL BCP-MCNC: 4 G/DL (ref 3.5–5)
ALP SERPL-CCNC: 99 U/L (ref 46–116)
ALT SERPL W P-5'-P-CCNC: 22 U/L (ref 12–78)
ANION GAP SERPL CALCULATED.3IONS-SCNC: 5 MMOL/L (ref 4–13)
AST SERPL W P-5'-P-CCNC: 16 U/L (ref 5–45)
BASOPHILS # BLD AUTO: 0.08 THOUSANDS/ΜL (ref 0–0.1)
BASOPHILS NFR BLD AUTO: 1 % (ref 0–1)
BILIRUB SERPL-MCNC: 0.57 MG/DL (ref 0.2–1)
BUN SERPL-MCNC: 12 MG/DL (ref 5–25)
CALCIUM SERPL-MCNC: 8.9 MG/DL (ref 8.3–10.1)
CHLORIDE SERPL-SCNC: 105 MMOL/L (ref 100–108)
CO2 SERPL-SCNC: 30 MMOL/L (ref 21–32)
CREAT SERPL-MCNC: 1.19 MG/DL (ref 0.6–1.3)
CRP SERPL QL: <3 MG/L
EOSINOPHIL # BLD AUTO: 0.57 THOUSAND/ΜL (ref 0–0.61)
EOSINOPHIL NFR BLD AUTO: 9 % (ref 0–6)
ERYTHROCYTE [DISTWIDTH] IN BLOOD BY AUTOMATED COUNT: 13 % (ref 11.6–15.1)
GFR SERPL CREATININE-BSD FRML MDRD: 83 ML/MIN/1.73SQ M
GLUCOSE P FAST SERPL-MCNC: 82 MG/DL (ref 65–99)
HCT VFR BLD AUTO: 46.4 % (ref 36.5–49.3)
HGB BLD-MCNC: 15.4 G/DL (ref 12–17)
IMM GRANULOCYTES # BLD AUTO: 0.01 THOUSAND/UL (ref 0–0.2)
IMM GRANULOCYTES NFR BLD AUTO: 0 % (ref 0–2)
LYMPHOCYTES # BLD AUTO: 1.93 THOUSANDS/ΜL (ref 0.6–4.47)
LYMPHOCYTES NFR BLD AUTO: 31 % (ref 14–44)
MCH RBC QN AUTO: 29.2 PG (ref 26.8–34.3)
MCHC RBC AUTO-ENTMCNC: 33.2 G/DL (ref 31.4–37.4)
MCV RBC AUTO: 88 FL (ref 82–98)
MONOCYTES # BLD AUTO: 0.58 THOUSAND/ΜL (ref 0.17–1.22)
MONOCYTES NFR BLD AUTO: 9 % (ref 4–12)
NEUTROPHILS # BLD AUTO: 3.04 THOUSANDS/ΜL (ref 1.85–7.62)
NEUTS SEG NFR BLD AUTO: 50 % (ref 43–75)
NRBC BLD AUTO-RTO: 0 /100 WBCS
PLATELET # BLD AUTO: 247 THOUSANDS/UL (ref 149–390)
PMV BLD AUTO: 11.4 FL (ref 8.9–12.7)
POTASSIUM SERPL-SCNC: 3.2 MMOL/L (ref 3.5–5.3)
PROT SERPL-MCNC: 8.1 G/DL (ref 6.4–8.2)
RBC # BLD AUTO: 5.27 MILLION/UL (ref 3.88–5.62)
SODIUM SERPL-SCNC: 140 MMOL/L (ref 136–145)
WBC # BLD AUTO: 6.21 THOUSAND/UL (ref 4.31–10.16)

## 2020-06-23 PROCEDURE — 80145 DRUG ASSAY ADALIMUMAB: CPT

## 2020-06-23 PROCEDURE — 86140 C-REACTIVE PROTEIN: CPT

## 2020-06-23 PROCEDURE — 82397 CHEMILUMINESCENT ASSAY: CPT

## 2020-06-23 PROCEDURE — 80053 COMPREHEN METABOLIC PANEL: CPT

## 2020-06-23 PROCEDURE — 36415 COLL VENOUS BLD VENIPUNCTURE: CPT

## 2020-06-23 PROCEDURE — 85025 COMPLETE CBC W/AUTO DIFF WBC: CPT

## 2020-06-28 LAB
ADALIMUMAB AB SERPL-MCNC: <25 NG/ML
ADALIMUMAB SERPL-MCNC: 7.7 UG/ML

## 2020-07-06 ENCOUNTER — TELEPHONE (OUTPATIENT)
Dept: GASTROENTEROLOGY | Facility: AMBULARY SURGERY CENTER | Age: 30
End: 2020-07-06

## 2020-07-06 NOTE — TELEPHONE ENCOUNTER
PT set up for VR TEAMS visit with dr Lisa Wiseman /7/13 at 5 pm  Pt email and telephone number verified  Pt aware of VR visit instructions

## 2020-07-13 ENCOUNTER — TELEMEDICINE (OUTPATIENT)
Dept: GASTROENTEROLOGY | Facility: MEDICAL CENTER | Age: 30
End: 2020-07-13
Payer: COMMERCIAL

## 2020-07-13 DIAGNOSIS — D84.9 IMMUNOCOMPROMISED STATE (HCC): ICD-10-CM

## 2020-07-13 DIAGNOSIS — K21.9 GASTROESOPHAGEAL REFLUX DISEASE, ESOPHAGITIS PRESENCE NOT SPECIFIED: ICD-10-CM

## 2020-07-13 DIAGNOSIS — K51.019 ULCERATIVE PANCOLITIS WITH COMPLICATION (HCC): Primary | ICD-10-CM

## 2020-07-13 DIAGNOSIS — M76.892 TENDINITIS INVOLVING LEFT HIP ABDUCTORS: ICD-10-CM

## 2020-07-13 PROCEDURE — 1036F TOBACCO NON-USER: CPT | Performed by: INTERNAL MEDICINE

## 2020-07-13 PROCEDURE — 99215 OFFICE O/P EST HI 40 MIN: CPT | Performed by: INTERNAL MEDICINE

## 2020-07-13 RX ORDER — OMEPRAZOLE 40 MG/1
40 CAPSULE, DELAYED RELEASE ORAL
Qty: 30 CAPSULE | Refills: 3 | Status: SHIPPED | OUTPATIENT
Start: 2020-07-13 | End: 2021-06-23 | Stop reason: HOSPADM

## 2020-07-13 RX ORDER — SUCRALFATE ORAL 1 G/10ML
1 SUSPENSION ORAL 4 TIMES DAILY
Qty: 1200 ML | Refills: 1 | Status: SHIPPED | OUTPATIENT
Start: 2020-07-13 | End: 2020-08-20 | Stop reason: ALTCHOICE

## 2020-07-13 NOTE — PROGRESS NOTES
Virtual Regular Visit      Assessment/Plan:  The patient is a 28-year-old male with complaint of pan ulcerative colitis diagnosed 1 year ago and currently on Humira with a good level who presents for follow-up  The number of bowel movements per day has improved although he still does have occasional loose stool more often than not  He denies any blood in his stool  He has occasional left lower quadrant discomfort  Most notably has noticed worsening reflux and this has been very bothersome to him  He also notes that occasionally he will have some days where he may not have a bowel movement and then followed by some days where he may have more bowel movements and is not sure if this is from something he ate her irritable bowel  It is possible that he does have some concurrent irritable bowel syndrome on top of his inflammatory bowel disease  Additionally, he has worsening GERD  This may also reflect Crohn's esophagitis  Available labs and imaging reviewed  Problem List Items Addressed This Visit        Digestive    Ulcerative colitis ([facility] ) - Primary    Relevant Medications    omeprazole (PriLOSEC) 40 MG capsule    sucralfate (CARAFATE) 1 g/10 mL suspension       Other    Immunocompromised state ([facility] )      Other Visit Diagnoses     Gastroesophageal reflux disease, esophagitis presence not specified        Relevant Medications    omeprazole (PriLOSEC) 40 MG capsule    sucralfate (CARAFATE) 1 g/10 mL suspension    Tendinitis involving left hip abductors            Continue Humira  Repeat blood work at next visit, including quantiferon gold  Colonoscopy or flex sig in Fall 2020  Stool sample as previously ordered  If heartburn persists, will get an EGD to evaluate for upper GI Crohn's disease  Will start omeprazole 40mg to be taken 30 minutes before breakfast every day  Carafate to be used as needed     Avoid NSAIDs, smoking  Avoid live vaccines  Yearly flu shot  Pneumococcal vaccine, Prevnar-13, Shingrix  Routine dermatologic, ophthalmologic examinations    Follow-up in 3 months         Reason for visit is   Chief Complaint   Patient presents with    Virtual Regular Visit        Encounter provider Gabriel Flanagan MD    Provider located at [address] [address]      Recent Visits  No visits were found meeting these conditions  Showing recent visits within past 7 days and meeting all other requirements     Today's Visits  Date Type Provider Dept   07/13/20 Telemedicine Gabriel Flanagan MD [facility] [place]   Showing today's visits and meeting all other requirements     Future Appointments  No visits were found meeting these conditions  Showing future appointments within next 150 days and meeting all other requirements        The patient was identified by name and date of birth  Branden Montgomery was informed that this is a telemedicine visit and that the visit is being conducted through [facility]  My office door was closed  No one else was in the room  He acknowledged consent and understanding of privacy and security of the video platform  The patient has agreed to participate and understands they can discontinue the visit at any time  Patient is aware this is a billable service  Subjective  Branden Montgomery is a 31 y o  male with UC who presents for follow-up and complaint of GERD  Overall he feels that things are the same  He has good days and bad days  He has been having some days with bad heartburn  One day it lasted for 12 hours  He has been watching what he eats and has been eating slower  Now maybe 2 days out of the week  It is the whole esophagus  He vomited liquid a few times  Some regurgitation  + nausea  It feels like food does not go down easily and it feels slow, especially if dry  He tried to avoid dry food     Some occasional abdominal pain, sharp/pressure, mild, on the LLQ and near the hip bone  2 semi-formed BMs per day  He had blood a few times, when the heartburn was bad (for 2 days)  No black stools  Weight stable  No rashes, mouth sores, joint pains  Past Medical History:   Diagnosis Date    Rhabdomyolysis 2011       No past surgical history on file  Current Outpatient Medications   Medication Sig Dispense Refill    HUMIRA PEN-CD/UC/HS STARTER 80 MG/0 8ML PNKT       mesalamine (ROWASA) 4 g Insert 60 mL (4 g total) into the rectum daily at bedtime 60 mL 12    omeprazole (PriLOSEC) 40 MG capsule Take 1 capsule (40 mg total) by mouth daily before breakfast 30 capsule 3    potassium chloride (MICRO-K) 10 MEQ CR capsule Take 1 capsule (10 mEq total) by mouth daily (Patient not taking: Reported on 3/22/2020) 30 capsule 3    predniSONE 10 mg tablet Take 4 tablets (40 mg total) by mouth daily (Patient not taking: Reported on 3/22/2020) 400 tablet 0    sucralfate (CARAFATE) 1 g/10 mL suspension Take 10 mL (1 g total) by mouth 4 (four) times a day 1200 mL 1    traMADol (ULTRAM) 50 mg tablet Take 1 tablet (50 mg total) by mouth every 6 (six) hours as needed for severe pain (Patient not taking: Reported on 3/22/2020) 12 tablet 0     No current facility-administered medications for this visit  Allergies   Allergen Reactions    Dilaudid [Hydromorphone]      Chest pain/pressure       REVIEW OF SYSTEMS:    CONSTITUTIONAL: Denies any fever, chills, rigors, and weight loss  HEENT: No earache or tinnitus  Denies hearing loss or visual disturbances  CARDIOVASCULAR: No chest pain or palpitations  RESPIRATORY: Denies any cough, hemoptysis, shortness of breath or dyspnea on exertion  GASTROINTESTINAL: As noted in the History of Present Illness  GENITOURINARY: No problems with urination  Denies any hematuria or dysuria  NEUROLOGIC: No dizziness or vertigo, denies headaches  MUSCULOSKELETAL: Denies any muscle or joint pain     SKIN: Denies skin rashes or itching  ENDOCRINE: Denies excessive thirst  Denies intolerance to heat or cold  PSYCHOSOCIAL: Denies depression or anxiety  Denies any recent memory loss  PHYSICAL EXAMINATION:  Appearance and vitals taken from home devices    General Appearance:   Alert, cooperative, no distress   HEENT:  Normocephalic, atraumatic, anicteric  Neck supple, symmetrical, trachea midline  Lungs:   Equal chest rise and unlabored breathing, normal effort, no coughing  Cardiovascular:   No visualized JVD  Abdomen:   No abdominal distension  Skin:   No jaundice, rashes, or lesions  Musculoskeletal:   Normal range of motion visualized  Psych:  Normal affect and normal insight  Neuro:  Alert and appropriate  I spent 30 minutes directly with the patient during this visit      VIRTUAL VISIT [address] [place] [address] acknowledges that he has consented to an online visit or consultation  He understands that the online visit is based solely on information provided by him, and that, in the absence of a face-to-face physical evaluation by the physician, the diagnosis he receives is both limited and provisional in terms of accuracy and completeness  This is not intended to replace a full medical face-to-face evaluation by the physician  Branden Montgomery understands and accepts these terms

## 2020-07-14 ENCOUNTER — TELEPHONE (OUTPATIENT)
Dept: GASTROENTEROLOGY | Facility: AMBULARY SURGERY CENTER | Age: 30
End: 2020-07-14

## 2020-07-14 NOTE — TELEPHONE ENCOUNTER
Patients GI provider:  Dr Chauhan Reasoner    Number to return call: (  [phone]    Reason for call: Pt calling to follow up on [place] papers that he needs for his employer by Wednesday 7-15-20    Scheduled procedure/appointment date if applicable: Apt/procedure NA

## 2020-07-15 ENCOUNTER — TELEPHONE (OUTPATIENT)
Dept: GASTROENTEROLOGY | Facility: CLINIC | Age: 30
End: 2020-07-15

## 2020-07-15 NOTE — TELEPHONE ENCOUNTER
FMLA papers sent to Dr Hakan Montalvo MA to complete, she will have him sign Friday when in the office, will fax once signed

## 2020-07-15 NOTE — TELEPHONE ENCOUNTER
Correction**-[facility] paperwork sent to medical assistant to fill out  Dr Perlita Gonzalez is out of office until Friday

## 2020-07-15 NOTE — TELEPHONE ENCOUNTER
Pt came into office to  FMLA forms, I did notify patient by message on phone that Dr Graham Solomon would not be able to sign them until Friday, Pt dropped paperwork off around 330 on 7/14/2020  Pt demanded paperwork to be completed and became very angry at that point, security called by front office staff as I was speaking with patient, Spoke with [place] supervisor, paper work was completed by Dr Graham Solomon and given to patient  Pt left office,    Paperwork scanned in

## 2020-08-20 ENCOUNTER — OFFICE VISIT (OUTPATIENT)
Dept: FAMILY MEDICINE CLINIC | Facility: CLINIC | Age: 30
End: 2020-08-20
Payer: COMMERCIAL

## 2020-08-20 VITALS
WEIGHT: 217.8 LBS | HEIGHT: 73 IN | SYSTOLIC BLOOD PRESSURE: 132 MMHG | DIASTOLIC BLOOD PRESSURE: 80 MMHG | BODY MASS INDEX: 28.86 KG/M2 | TEMPERATURE: 98.2 F

## 2020-08-20 DIAGNOSIS — G89.29 CHRONIC RIGHT-SIDED LOW BACK PAIN WITH RIGHT-SIDED SCIATICA: Primary | ICD-10-CM

## 2020-08-20 DIAGNOSIS — M54.41 CHRONIC RIGHT-SIDED LOW BACK PAIN WITH RIGHT-SIDED SCIATICA: Primary | ICD-10-CM

## 2020-08-20 DIAGNOSIS — D22.9 MULTIPLE ATYPICAL SKIN MOLES: ICD-10-CM

## 2020-08-20 DIAGNOSIS — Z80.8 FAMILY HISTORY OF SKIN CANCER: ICD-10-CM

## 2020-08-20 DIAGNOSIS — N50.82 PAIN IN SCROTUM: ICD-10-CM

## 2020-08-20 PROCEDURE — 99214 OFFICE O/P EST MOD 30 MIN: CPT | Performed by: FAMILY MEDICINE

## 2020-08-20 PROCEDURE — 3008F BODY MASS INDEX DOCD: CPT | Performed by: FAMILY MEDICINE

## 2020-08-20 PROCEDURE — 1036F TOBACCO NON-USER: CPT | Performed by: FAMILY MEDICINE

## 2020-08-20 PROCEDURE — 3725F SCREEN DEPRESSION PERFORMED: CPT | Performed by: FAMILY MEDICINE

## 2020-08-20 RX ORDER — ADALIMUMAB 40MG/0.4ML
KIT SUBCUTANEOUS
COMMUNITY
Start: 2020-08-11 | End: 2020-09-02 | Stop reason: SDUPTHER

## 2020-08-20 NOTE — PATIENT INSTRUCTIONS

## 2020-08-20 NOTE — PROGRESS NOTES
Assessment/Plan: For his back pain, we will refer to physical therapy  For his groin pain, refer to Urology  For his moles, refer to Dermatology  Call us if symptoms continue or increase  Problem List Items Addressed This Visit        Nervous and Auditory    Chronic right-sided low back pain with right-sided sciatica - Primary    Relevant Orders    Ambulatory referral to Physical Therapy       Other    Pain in scrotum    Relevant Orders    Ambulatory referral to Urology      Other Visit Diagnoses     Family history of skin cancer        Relevant Orders    Ambulatory referral to Dermatology    Multiple atypical skin moles        Relevant Orders    Ambulatory referral to Dermatology    BMI 28 0-28 9,adult               Diagnoses and all orders for this visit:    Chronic right-sided low back pain with right-sided sciatica  -     Ambulatory referral to Physical Therapy; Future    Pain in scrotum  -     Ambulatory referral to Urology; Future    Family history of skin cancer  -     Ambulatory referral to Dermatology; Future    Multiple atypical skin moles  -     Ambulatory referral to Dermatology; Future    BMI 28 0-28 9,adult    Other orders  -     Humira Pen 40 MG/0 4ML PNKT        No problem-specific Assessment & Plan notes found for this encounter  Subjective:      Patient ID: Asher Peck is a 31 y o  male  Patient here today with a couple concerns  Patient states about 2 weeks ago, had an increase in his low back pain  Patient has had back troubles for years  This time it is a little different  Mostly on the right side, radiating down the right leg  Hurts 1st when he gets up from a seated position  Takes him awhile to straight now  No bowel or bladder problems  Patient usually was able to correct his back troubles with stretching, that is not helping currently  Next concern is that he gets shooting pain in the top of the scrotum off and on  It lasts about 3 seconds and then goes away  He states feels like intense gas pain  Happens more if he has intercourse a lot  It does not happen every day  Finally, patient has multiple moles that he would like looked at  Patient states he has a new mole on his left foot that appeared couple months ago  Patient has a family history of melanoma  Patient has had moles removed in the past       The following portions of the patient's history were reviewed and updated as appropriate:   He has a past medical history of Rhabdomyolysis (2011)  ,  does not have any pertinent problems on file  ,   has no past surgical history on file  ,  family history includes Diabetes in his paternal grandfather; No Known Problems in his father and mother  ,   reports that he has never smoked  He has never used smokeless tobacco  He reports previous alcohol use  He reports that he does not use drugs  ,  is allergic to dilaudid [hydromorphone]     Current Outpatient Medications   Medication Sig Dispense Refill    Humira Pen 40 MG/0 4ML PNKT       mesalamine (ROWASA) 4 g Insert 60 mL (4 g total) into the rectum daily at bedtime (Patient not taking: Reported on 8/20/2020) 60 mL 12    omeprazole (PriLOSEC) 40 MG capsule Take 1 capsule (40 mg total) by mouth daily before breakfast (Patient not taking: Reported on 8/20/2020) 30 capsule 3     No current facility-administered medications for this visit  Review of Systems   Constitutional: Negative  Respiratory: Negative  Cardiovascular: Negative  Gastrointestinal: Negative  Genitourinary:        As per HPI   Musculoskeletal: Positive for back pain  Skin:        Multiple moles  Family history of melanoma  Objective:  Vitals:    08/20/20 0752   BP: 132/80   Temp: 98 2 °F (36 8 °C)   Weight: 98 8 kg (217 lb 12 8 oz)   Height: 6' 1" (1 854 m)     Body mass index is 28 74 kg/m²  Physical Exam  Vitals signs reviewed  Constitutional:       General: He is not in acute distress       Appearance: He is well-developed  He is not diaphoretic  HENT:      Head: Normocephalic and atraumatic  Eyes:      Conjunctiva/sclera: Conjunctivae normal    Genitourinary:     Penis: Normal        Scrotum/Testes: Normal    Musculoskeletal:         General: Tenderness (Right lower lumbar paraspinal tenderness around L5) present  Skin:     Findings: Lesion ( multiple moles  1 5 mm mole on his left foot near his 1st toe) present  Neurological:      Mental Status: He is alert and oriented to person, place, and time  Psychiatric:         Behavior: Behavior normal          Thought Content: Thought content normal          Judgment: Judgment normal          BMI Counseling: Body mass index is 28 74 kg/m²  The BMI is above normal  Nutrition recommendations include reducing portion sizes, decreasing overall calorie intake, 3-5 servings of fruits/vegetables daily, reducing fast food intake, consuming healthier snacks, decreasing soda and/or juice intake, moderation in carbohydrate intake, increasing intake of lean protein, reducing intake of saturated fat and trans fat and reducing intake of cholesterol  Exercise recommendations include exercising 3-5 times per week

## 2020-08-26 ENCOUNTER — EVALUATION (OUTPATIENT)
Dept: PHYSICAL THERAPY | Facility: CLINIC | Age: 30
End: 2020-08-26
Payer: COMMERCIAL

## 2020-08-26 DIAGNOSIS — M54.41 CHRONIC RIGHT-SIDED LOW BACK PAIN WITH RIGHT-SIDED SCIATICA: Primary | ICD-10-CM

## 2020-08-26 DIAGNOSIS — G89.29 CHRONIC RIGHT-SIDED LOW BACK PAIN WITH RIGHT-SIDED SCIATICA: Primary | ICD-10-CM

## 2020-08-26 PROCEDURE — 97110 THERAPEUTIC EXERCISES: CPT | Performed by: PHYSICAL THERAPIST

## 2020-08-26 PROCEDURE — 97140 MANUAL THERAPY 1/> REGIONS: CPT | Performed by: PHYSICAL THERAPIST

## 2020-08-26 PROCEDURE — 97161 PT EVAL LOW COMPLEX 20 MIN: CPT | Performed by: PHYSICAL THERAPIST

## 2020-08-26 PROCEDURE — 97535 SELF CARE MNGMENT TRAINING: CPT | Performed by: PHYSICAL THERAPIST

## 2020-08-26 NOTE — PROGRESS NOTES
PT Evaluation     Today's date: 2020  Patient name: Cris Morillo  : 1990  MRN: [id number]  Referring provider: Radha Brooks DO  Dx:   Encounter Diagnosis     ICD-10-CM    1  Chronic right-sided low back pain with right-sided sciatica  M54 41 Ambulatory referral to Physical Therapy    G89 29                   Assessment  Understanding of Dx/Px/POC: good   Prognosis: good    Goals  STGs: To be complete within 4 weeks  - Decrease/centralize pain to < 2/10 at worst  - Increase lumbar extension ROM to WNL  - Increase bilateral hip flexor flexibility to WNL  - Increase core stability and posterior lateral LE chain strength to > 4+/5  - Improve postural awareness capacity to > 60min before deficit    LTGS: To be complete within 6 weeks  - Able to sit for any extended amount of time without pain or limitation for increased safety and functional capacity with ADLs and work-related duty  - Able to repetitively bend over at trunk without pain or limitation for increased safety and functional capacity with ADLs and and work-related duty  - Able to complete all lifting/carrying activity without pain or limitation for increased safety and functional capacity with ADLs and work-related duty  - Able to complete transfers repetitively without pain or limitation for increased safety and functional capacity with ADLs and work-related duty    Plan  Planned therapy interventions: manual therapy, neuromuscular re-education, self care, patient education, therapeutic activities and therapeutic exercise  Frequency: 2x week  Duration in weeks: 4       Pt is a 31 y o  male with LBP and R LE radicular sx who presents with functional deficits including decreased capacity with sitting, lifting/carrying, transfers, and bending over at the trunk   Upon completion of today's initial evaluation, Robin's sx remain consistent with R side post/lat lumbar derangement secondary to postural dysfunction and improper functional movement mechanics  Pt will benefit from skilled physical therapy to address current deficits  Subjective Evaluation    Pain  Current pain ratin  At best pain ratin  At worst pain ratin  Location: LBP with R LE radicular sx    Patient Goals  Patient goals for therapy: increased strength, decreased pain and increased motion         Pt reports he has had worsening LBP with R LE radicular sx for > 1 month  Pt reports his sx have continued to worsen to point where it is negatively effecting his overall safety and functional capacity with ADLs and work-related duty          Objective Pain level ranges 2-8/10  AROM: Lumbar spine 50% decreased, bilateral Hip extension and IR 50% decreased  Strength: Core stability and post/lat LE chain strength 3+/5  Postural Awareness: Poor (Ant pelvic tilt)  Repeated movement testing: (+) for R side post/lat lumbar derangement  Gait: Intermittent Mild to mod lateral limp  Hip flexor flexibility: (+) Dev Test bilaterally  FOTO: 53: GOAL 75  Unable sit without pain and limitation  Unable to bend over at trunk without pain and limitation  Unable to complete lifting/carrying activity without pain and limitation  Unable to complete transfers without pain and limitation    Flowsheet Rows      Most Recent Value   PT/OT G-Codes   Current Score  53   Projected Score  75               Precautions: Daniel principle for R side post/lat lumbar derangement    Daily Treatment Diary    HEP: Handout provided and discussed      Manuals 20            ART x15'            PROM/Stretch             IASTM             STM/Triggerpoint             JM x10'                         Neuro Re-Ed             GIAN x15'            Postural training x10'                                                                             Ther Ex             PPU 3x10                                                                                                                    Ther Activity Gait Training                                       Modalities             MHP             CP x10' Prone Wedge            US/Stim

## 2020-09-01 ENCOUNTER — OFFICE VISIT (OUTPATIENT)
Dept: PHYSICAL THERAPY | Facility: CLINIC | Age: 30
End: 2020-09-01
Payer: COMMERCIAL

## 2020-09-01 DIAGNOSIS — G89.29 CHRONIC RIGHT-SIDED LOW BACK PAIN WITH RIGHT-SIDED SCIATICA: Primary | ICD-10-CM

## 2020-09-01 DIAGNOSIS — M54.41 CHRONIC RIGHT-SIDED LOW BACK PAIN WITH RIGHT-SIDED SCIATICA: Primary | ICD-10-CM

## 2020-09-01 PROCEDURE — 97140 MANUAL THERAPY 1/> REGIONS: CPT | Performed by: PHYSICAL THERAPIST

## 2020-09-01 PROCEDURE — 97110 THERAPEUTIC EXERCISES: CPT | Performed by: PHYSICAL THERAPIST

## 2020-09-01 PROCEDURE — 97112 NEUROMUSCULAR REEDUCATION: CPT | Performed by: PHYSICAL THERAPIST

## 2020-09-01 NOTE — PROGRESS NOTES
Daily Note     Today's date: 2020  Patient name: Paula Edwards  : 1990  MRN: [id number]  Referring provider: Marisela Love DO  Dx:   Encounter Diagnosis     ICD-10-CM    1  Chronic right-sided low back pain with right-sided sciatica  M54 41     G89 29                   Subjective: Pt reports he is doing about 50% better since IE  Pain level reduced and continues to progress toward centralization  HEP continues to go well  Anxious to continue making progress  Objective: See treatment diary below      Assessment: Tolerated treatment well  Patient demonstrated fatigue post treatment, exhibited good technique with therapeutic exercises and would benefit from continued PT      Plan: Continue per plan of care  Progress treatment as tolerated           Precautions: Daniel principle for R side post/lat lumbar derangement    Daily Treatment Diary    HEP: Handout provided and discussed      Manuals 20           ART x15' x15'           PROM/Stretch             IASTM             STM/Triggerpoint             JM x10' x10'                        Neuro Re-Ed             GIAN x15' x20'           Postural training x10' x10'                                                                            Ther Ex             PPU 3x10 3x10           SL Prone Hip Ext (R Only)  3x10           Prone Hip Intern Rot  3x10 L2           Prone Hip Extern  3x10                                                                            Ther Activity                                       Gait Training                                       Modalities             MHP             CP x10' Prone Wedge x10'           US/Stim

## 2020-09-02 DIAGNOSIS — K51.019 ULCERATIVE PANCOLITIS WITH COMPLICATION (HCC): Primary | ICD-10-CM

## 2020-09-02 RX ORDER — ADALIMUMAB 40MG/0.4ML
KIT SUBCUTANEOUS
Qty: 6 EACH | Refills: 3 | Status: SHIPPED | OUTPATIENT
Start: 2020-09-02 | End: 2021-09-14 | Stop reason: SDUPTHER

## 2020-09-02 NOTE — TELEPHONE ENCOUNTER
Incoming call -22 [facility] pharmacy requesting script for patient's Humira Pen       8/11/20 Humira script was sent to [facility]/ ordered by Dr Karely Gallegos patient Hx -ulcerative pancolitis  -continue Humira  Script entered

## 2020-09-08 ENCOUNTER — OFFICE VISIT (OUTPATIENT)
Dept: PHYSICAL THERAPY | Facility: CLINIC | Age: 30
End: 2020-09-08
Payer: COMMERCIAL

## 2020-09-08 DIAGNOSIS — G89.29 CHRONIC RIGHT-SIDED LOW BACK PAIN WITH RIGHT-SIDED SCIATICA: Primary | ICD-10-CM

## 2020-09-08 DIAGNOSIS — M54.41 CHRONIC RIGHT-SIDED LOW BACK PAIN WITH RIGHT-SIDED SCIATICA: Primary | ICD-10-CM

## 2020-09-08 PROCEDURE — 97140 MANUAL THERAPY 1/> REGIONS: CPT | Performed by: PHYSICAL THERAPIST

## 2020-09-08 PROCEDURE — 97112 NEUROMUSCULAR REEDUCATION: CPT | Performed by: PHYSICAL THERAPIST

## 2020-09-08 PROCEDURE — 97110 THERAPEUTIC EXERCISES: CPT | Performed by: PHYSICAL THERAPIST

## 2020-09-08 NOTE — PROGRESS NOTES
Daily Note     Today's date: 2020  Patient name: Rosanna Khan  : 1990  MRN: [id number]  Referring provider: Marquise Singh DO  Dx:   Encounter Diagnosis     ICD-10-CM    1  Chronic right-sided low back pain with right-sided sciatica  M54 41     G89 29                   Subjective: Pt reports he is doing about 75% better since IE  Pain level reduced and continues to progress toward centralization  HEP continues to go well  Anxious to continue making progress  Objective: See treatment diary below      Assessment: Tolerated treatment well  Patient demonstrated fatigue post treatment, exhibited good technique with therapeutic exercises and would benefit from continued PT      Plan: Continue per plan of care  Progress treatment as tolerated           Precautions: Daniel principle for R side post/lat lumbar derangement    Daily Treatment Diary    HEP: Handout provided and discussed      Manuals 20           ART x15' x15'           PROM/Stretch             IASTM             STM/Triggerpoint             JM x10' x10'                        Neuro Re-Ed             GIAN x15' x20'           Postural training x10' x10'                                                                            Ther Ex             PPU 3x10 3x10           SL Prone Hip Ext (R Only)  3x10           Prone Hip Intern Rot  3x10 L2           Prone Hip Extern  3x10                                                                            Ther Activity                                       Gait Training                                       Modalities             MHP             CP x10' Prone Wedge x10'           US/Stim

## 2020-09-11 ENCOUNTER — APPOINTMENT (OUTPATIENT)
Dept: LAB | Facility: MEDICAL CENTER | Age: 30
End: 2020-09-11
Payer: COMMERCIAL

## 2020-09-11 DIAGNOSIS — K51.019 ULCERATIVE PANCOLITIS WITH COMPLICATION (HCC): ICD-10-CM

## 2020-09-11 PROCEDURE — 83993 ASSAY FOR CALPROTECTIN FECAL: CPT

## 2020-09-15 ENCOUNTER — OFFICE VISIT (OUTPATIENT)
Dept: PHYSICAL THERAPY | Facility: CLINIC | Age: 30
End: 2020-09-15
Payer: COMMERCIAL

## 2020-09-15 DIAGNOSIS — M54.41 CHRONIC RIGHT-SIDED LOW BACK PAIN WITH RIGHT-SIDED SCIATICA: Primary | ICD-10-CM

## 2020-09-15 DIAGNOSIS — G89.29 CHRONIC RIGHT-SIDED LOW BACK PAIN WITH RIGHT-SIDED SCIATICA: Primary | ICD-10-CM

## 2020-09-15 PROCEDURE — 97110 THERAPEUTIC EXERCISES: CPT | Performed by: PHYSICAL THERAPIST

## 2020-09-15 PROCEDURE — 97112 NEUROMUSCULAR REEDUCATION: CPT | Performed by: PHYSICAL THERAPIST

## 2020-09-15 PROCEDURE — 97140 MANUAL THERAPY 1/> REGIONS: CPT | Performed by: PHYSICAL THERAPIST

## 2020-09-15 NOTE — PROGRESS NOTES
Daily Note     Today's date: 9/15/2020  Patient name: Mariana Novoa  : 1990  MRN: [id number]  Referring provider: Carly Soriano DO  Dx:   Encounter Diagnosis     ICD-10-CM    1  Chronic right-sided low back pain with right-sided sciatica  M54 41     G89 29                   Subjective: Pt reports injuring himself at work last week  Reports bending, lifting, and twisting  Severe LBP and R LE radicular sx  Hoping to centralize sx from today's session  Objective: See treatment diary below      Assessment: Tolerated treatment well  Sx centralized by end of session  Will continue to monitor  Pt instructed on body mechanics ans HEP consistency  Patient demonstrated fatigue post treatment, exhibited good technique with therapeutic exercises and would benefit from continued PT      Plan: Continue per plan of care  Progress treatment as tolerated           Precautions: Daniel principle for R side post/lat lumbar derangement    Daily Treatment Diary    HEP: Handout provided and discussed      Manuals 8/26/20 9/8/20 9/15/20          ART x15' x15' x15'          PROM/Stretch             IASTM             STM/Triggerpoint             JM x10' x10' x10'                       Neuro Re-Ed             GIAN x15' x20' x20'          Postural training x10' x10' x10'                                                                           Ther Ex             PPU 3x10 3x10 3x10          SL Prone Hip Ext (R Only)  3x10 3x10          Prone Hip Intern Rot  3x10 L2 3x10 L2          Prone Hip Extern  3x10 3x10                                                                           Ther Activity                                       Gait Training                                       Modalities             MHP             CP x10' Prone Wedge x10' x10'          US/Stim

## 2020-09-19 LAB — CALPROTECTIN STL-MCNT: 179 UG/G (ref 0–120)

## 2020-09-22 ENCOUNTER — APPOINTMENT (OUTPATIENT)
Dept: PHYSICAL THERAPY | Facility: CLINIC | Age: 30
End: 2020-09-22
Payer: COMMERCIAL

## 2020-09-23 ENCOUNTER — APPOINTMENT (OUTPATIENT)
Dept: URGENT CARE | Facility: CLINIC | Age: 30
End: 2020-09-23
Payer: OTHER MISCELLANEOUS

## 2020-09-23 PROCEDURE — G0382 LEV 3 HOSP TYPE B ED VISIT: HCPCS | Performed by: PHYSICIAN ASSISTANT

## 2020-09-23 PROCEDURE — 99283 EMERGENCY DEPT VISIT LOW MDM: CPT | Performed by: PHYSICIAN ASSISTANT

## 2020-09-26 ENCOUNTER — APPOINTMENT (OUTPATIENT)
Dept: URGENT CARE | Facility: CLINIC | Age: 30
End: 2020-09-26
Payer: OTHER MISCELLANEOUS

## 2020-09-26 PROCEDURE — 99213 OFFICE O/P EST LOW 20 MIN: CPT

## 2020-09-29 ENCOUNTER — APPOINTMENT (OUTPATIENT)
Dept: PHYSICAL THERAPY | Facility: CLINIC | Age: 30
End: 2020-09-29
Payer: COMMERCIAL

## 2020-10-15 NOTE — PROGRESS NOTES
PT Discharge    Today's date: 10/15/2020  Patient name: Branden Montgomery  : 1990  MRN: [id number]  Referring provider: Verito Gan DO  Dx:   Encounter Diagnosis     ICD-10-CM    1  Chronic right-sided low back pain with right-sided sciatica  M54 41     G89 29        Start Time: 0940  Stop Time: 1100  Total time in clinic (min): 80 minutes      Goals  STGs: To be complete within 4 weeks (not met)  - Decrease/centralize pain to < 2/10 at worst  - Increase lumbar extension ROM to WNL  - Increase bilateral hip flexor flexibility to WNL  - Increase core stability and posterior lateral LE chain strength to > 4+/5  - Improve postural awareness capacity to > 60min before deficit    LTGS: To be complete within 6 weeks (not met)  - Able to sit for any extended amount of time without pain or limitation for increased safety and functional capacity with ADLs and work-related duty  - Able to repetitively bend over at trunk without pain or limitation for increased safety and functional capacity with ADLs and and work-related duty  - Able to complete all lifting/carrying activity without pain or limitation for increased safety and functional capacity with ADLs and work-related duty  - Able to complete transfers repetitively without pain or limitation for increased safety and functional capacity with ADLs and work-related duty       Pt will be D/C from physical therapy, as he has not reached out to schedule since 9/15/20  Goals not met          Subjective Evaluation    Pain  At best pain ratin  At worst pain ratin  Location: LBP with R LE radicular sx             Objective Pain level ranges 2-8/10  AROM: Lumbar spine 50% decreased, bilateral Hip extension and IR 50% decreased  Strength: Core stability and post/lat LE chain strength 3+/5  Postural Awareness: Poor (Ant pelvic tilt)  Repeated movement testing: (+) for R side post/lat lumbar derangement  Gait: Intermittent Mild to mod lateral limp  Hip flexor flexibility: (+) Dev Test bilaterally  FOTO: 53: GOAL 75  Unable sit without pain and limitation  Unable to bend over at trunk without pain and limitation  Unable to complete lifting/carrying activity without pain and limitation  Unable to complete transfers without pain and limitation

## 2020-10-28 ENCOUNTER — TELEPHONE (OUTPATIENT)
Dept: GASTROENTEROLOGY | Facility: CLINIC | Age: 30
End: 2020-10-28

## 2020-11-06 ENCOUNTER — APPOINTMENT (OUTPATIENT)
Dept: URGENT CARE | Facility: CLINIC | Age: 30
End: 2020-11-06
Payer: OTHER MISCELLANEOUS

## 2020-11-06 PROCEDURE — 99213 OFFICE O/P EST LOW 20 MIN: CPT

## 2020-11-19 ENCOUNTER — TELEPHONE (OUTPATIENT)
Dept: FAMILY MEDICINE CLINIC | Facility: CLINIC | Age: 30
End: 2020-11-19

## 2020-11-20 ENCOUNTER — TELEMEDICINE (OUTPATIENT)
Dept: FAMILY MEDICINE CLINIC | Facility: CLINIC | Age: 30
End: 2020-11-20
Payer: COMMERCIAL

## 2020-11-20 ENCOUNTER — TELEPHONE (OUTPATIENT)
Dept: FAMILY MEDICINE CLINIC | Facility: CLINIC | Age: 30
End: 2020-11-20

## 2020-11-20 VITALS — HEIGHT: 73 IN | TEMPERATURE: 99.8 F | BODY MASS INDEX: 28.76 KG/M2 | WEIGHT: 217 LBS

## 2020-11-20 DIAGNOSIS — Z03.818 ENCOUNTER FOR OBSERVATION FOR SUSPECTED EXPOSURE TO OTHER BIOLOGICAL AGENTS RULED OUT: ICD-10-CM

## 2020-11-20 DIAGNOSIS — B34.9 VIRAL INFECTION, UNSPECIFIED: ICD-10-CM

## 2020-11-20 PROCEDURE — U0003 INFECTIOUS AGENT DETECTION BY NUCLEIC ACID (DNA OR RNA); SEVERE ACUTE RESPIRATORY SYNDROME CORONAVIRUS 2 (SARS-COV-2) (CORONAVIRUS DISEASE [COVID-19]), AMPLIFIED PROBE TECHNIQUE, MAKING USE OF HIGH THROUGHPUT TECHNOLOGIES AS DESCRIBED BY CMS-2020-01-R: HCPCS | Performed by: FAMILY MEDICINE

## 2020-11-20 PROCEDURE — 3008F BODY MASS INDEX DOCD: CPT | Performed by: FAMILY MEDICINE

## 2020-11-20 PROCEDURE — 99214 OFFICE O/P EST MOD 30 MIN: CPT | Performed by: FAMILY MEDICINE

## 2020-11-20 PROCEDURE — 1036F TOBACCO NON-USER: CPT | Performed by: FAMILY MEDICINE

## 2020-11-20 RX ORDER — AZITHROMYCIN 250 MG/1
TABLET, FILM COATED ORAL
Qty: 6 TABLET | Refills: 0 | Status: SHIPPED | OUTPATIENT
Start: 2020-11-20 | End: 2020-11-25

## 2020-11-22 ENCOUNTER — TELEPHONE (OUTPATIENT)
Dept: OTHER | Facility: OTHER | Age: 30
End: 2020-11-22

## 2020-11-22 LAB — SARS-COV-2 RNA SPEC QL NAA+PROBE: DETECTED

## 2020-11-23 ENCOUNTER — TELEPHONE (OUTPATIENT)
Dept: FAMILY MEDICINE CLINIC | Facility: CLINIC | Age: 30
End: 2020-11-23

## 2020-11-30 ENCOUNTER — TELEPHONE (OUTPATIENT)
Dept: FAMILY MEDICINE CLINIC | Facility: CLINIC | Age: 30
End: 2020-11-30

## 2021-02-28 ENCOUNTER — APPOINTMENT (EMERGENCY)
Dept: CT IMAGING | Facility: HOSPITAL | Age: 31
End: 2021-02-28
Payer: COMMERCIAL

## 2021-02-28 ENCOUNTER — HOSPITAL ENCOUNTER (OUTPATIENT)
Facility: HOSPITAL | Age: 31
Setting detail: OBSERVATION
Discharge: HOME | End: 2021-03-02
Attending: EMERGENCY MEDICINE | Admitting: FAMILY MEDICINE
Payer: COMMERCIAL

## 2021-02-28 DIAGNOSIS — K35.80 ACUTE APPENDICITIS, UNSPECIFIED ACUTE APPENDICITIS TYPE: ICD-10-CM

## 2021-02-28 DIAGNOSIS — K51.90 ULCERATIVE COLITIS WITHOUT COMPLICATIONS, UNSPECIFIED LOCATION (HCC): ICD-10-CM

## 2021-02-28 DIAGNOSIS — R10.31 RLQ ABDOMINAL PAIN: Primary | ICD-10-CM

## 2021-02-28 PROBLEM — E87.6 HYPOKALEMIA: Status: ACTIVE | Noted: 2021-02-28

## 2021-02-28 LAB
ALBUMIN SERPL BCP-MCNC: 4.4 G/DL (ref 3.5–5)
ALP SERPL-CCNC: 103 U/L (ref 46–116)
ALT SERPL W P-5'-P-CCNC: 24 U/L (ref 12–78)
ANION GAP SERPL CALCULATED.3IONS-SCNC: 6 MMOL/L (ref 4–13)
AST SERPL W P-5'-P-CCNC: 18 U/L (ref 5–45)
BASOPHILS # BLD AUTO: 0.08 THOUSANDS/ΜL (ref 0–0.1)
BASOPHILS NFR BLD AUTO: 1 % (ref 0–1)
BILIRUB SERPL-MCNC: 0.6 MG/DL (ref 0.2–1)
BUN SERPL-MCNC: 18 MG/DL (ref 5–25)
CALCIUM SERPL-MCNC: 8.9 MG/DL (ref 8.3–10.1)
CHLORIDE SERPL-SCNC: 101 MMOL/L (ref 100–108)
CO2 SERPL-SCNC: 33 MMOL/L (ref 21–32)
CREAT SERPL-MCNC: 1.42 MG/DL (ref 0.6–1.3)
EOSINOPHIL # BLD AUTO: 0.26 THOUSAND/ΜL (ref 0–0.61)
EOSINOPHIL NFR BLD AUTO: 3 % (ref 0–6)
ERYTHROCYTE [DISTWIDTH] IN BLOOD BY AUTOMATED COUNT: 12.7 % (ref 11.6–15.1)
GFR SERPL CREATININE-BSD FRML MDRD: 66 ML/MIN/1.73SQ M
GLUCOSE SERPL-MCNC: 78 MG/DL (ref 65–140)
HCT VFR BLD AUTO: 48.6 % (ref 36.5–49.3)
HGB BLD-MCNC: 16.3 G/DL (ref 12–17)
IMM GRANULOCYTES # BLD AUTO: 0.01 THOUSAND/UL (ref 0–0.2)
IMM GRANULOCYTES NFR BLD AUTO: 0 % (ref 0–2)
LIPASE SERPL-CCNC: 97 U/L (ref 73–393)
LYMPHOCYTES # BLD AUTO: 2.32 THOUSANDS/ΜL (ref 0.6–4.47)
LYMPHOCYTES NFR BLD AUTO: 23 % (ref 14–44)
MCH RBC QN AUTO: 29.7 PG (ref 26.8–34.3)
MCHC RBC AUTO-ENTMCNC: 33.5 G/DL (ref 31.4–37.4)
MCV RBC AUTO: 89 FL (ref 82–98)
MONOCYTES # BLD AUTO: 0.73 THOUSAND/ΜL (ref 0.17–1.22)
MONOCYTES NFR BLD AUTO: 7 % (ref 4–12)
NEUTROPHILS # BLD AUTO: 6.67 THOUSANDS/ΜL (ref 1.85–7.62)
NEUTS SEG NFR BLD AUTO: 66 % (ref 43–75)
NRBC BLD AUTO-RTO: 0 /100 WBCS
PLATELET # BLD AUTO: 264 THOUSANDS/UL (ref 149–390)
PMV BLD AUTO: 11.1 FL (ref 8.9–12.7)
POTASSIUM SERPL-SCNC: 3.4 MMOL/L (ref 3.5–5.3)
PROT SERPL-MCNC: 8.4 G/DL (ref 6.4–8.2)
RBC # BLD AUTO: 5.48 MILLION/UL (ref 3.88–5.62)
SODIUM SERPL-SCNC: 140 MMOL/L (ref 136–145)
WBC # BLD AUTO: 10.07 THOUSAND/UL (ref 4.31–10.16)

## 2021-02-28 PROCEDURE — 80053 COMPREHEN METABOLIC PANEL: CPT | Performed by: EMERGENCY MEDICINE

## 2021-02-28 PROCEDURE — 99220 PR INITIAL OBSERVATION CARE/DAY 70 MINUTES: CPT | Performed by: PHYSICIAN ASSISTANT

## 2021-02-28 PROCEDURE — 85025 COMPLETE CBC W/AUTO DIFF WBC: CPT | Performed by: EMERGENCY MEDICINE

## 2021-02-28 PROCEDURE — 99285 EMERGENCY DEPT VISIT HI MDM: CPT | Performed by: EMERGENCY MEDICINE

## 2021-02-28 PROCEDURE — 99285 EMERGENCY DEPT VISIT HI MDM: CPT

## 2021-02-28 PROCEDURE — 83690 ASSAY OF LIPASE: CPT | Performed by: EMERGENCY MEDICINE

## 2021-02-28 PROCEDURE — 36415 COLL VENOUS BLD VENIPUNCTURE: CPT | Performed by: EMERGENCY MEDICINE

## 2021-02-28 PROCEDURE — G1004 CDSM NDSC: HCPCS

## 2021-02-28 PROCEDURE — 74177 CT ABD & PELVIS W/CONTRAST: CPT

## 2021-02-28 RX ORDER — SODIUM CHLORIDE 9 MG/ML
125 INJECTION, SOLUTION INTRAVENOUS CONTINUOUS
Status: DISCONTINUED | OUTPATIENT
Start: 2021-02-28 | End: 2021-03-01

## 2021-02-28 RX ADMIN — IOHEXOL 100 ML: 350 INJECTION, SOLUTION INTRAVENOUS at 21:20

## 2021-02-28 NOTE — LETTER
[facility]  [address] [place] [id number]  Dept: [phone]    March 2, 2021     Patient: Bora Geiger   YOB: 1990   Date of Visit: 2/28/2021       To Whom it May Concern:    Elian Buckley is under my professional care  He was seen in the hospital from 2/28/2021   to 03/02/21  He may return to work on 3/8/2021 with no weight lifting restrictions  If you have any questions or concerns, please don't hesitate to call           Sincerely,          Dara Silva PA-C

## 2021-03-01 ENCOUNTER — TELEPHONE (OUTPATIENT)
Dept: GASTROENTEROLOGY | Facility: CLINIC | Age: 31
End: 2021-03-01

## 2021-03-01 LAB
ALBUMIN SERPL BCP-MCNC: 3.8 G/DL (ref 3.5–5)
ALP SERPL-CCNC: 92 U/L (ref 46–116)
ALT SERPL W P-5'-P-CCNC: 23 U/L (ref 12–78)
ANION GAP SERPL CALCULATED.3IONS-SCNC: 9 MMOL/L (ref 4–13)
AST SERPL W P-5'-P-CCNC: 12 U/L (ref 5–45)
BILIRUB SERPL-MCNC: 0.5 MG/DL (ref 0.2–1)
BUN SERPL-MCNC: 17 MG/DL (ref 5–25)
CALCIUM SERPL-MCNC: 8.6 MG/DL (ref 8.3–10.1)
CHLORIDE SERPL-SCNC: 104 MMOL/L (ref 100–108)
CO2 SERPL-SCNC: 26 MMOL/L (ref 21–32)
CREAT SERPL-MCNC: 1.2 MG/DL (ref 0.6–1.3)
ERYTHROCYTE [DISTWIDTH] IN BLOOD BY AUTOMATED COUNT: 12.7 % (ref 11.6–15.1)
GFR SERPL CREATININE-BSD FRML MDRD: 81 ML/MIN/1.73SQ M
GLUCOSE SERPL-MCNC: 95 MG/DL (ref 65–140)
HCT VFR BLD AUTO: 45.6 % (ref 36.5–49.3)
HGB BLD-MCNC: 15.4 G/DL (ref 12–17)
MAGNESIUM SERPL-MCNC: 2.3 MG/DL (ref 1.6–2.6)
MCH RBC QN AUTO: 29.6 PG (ref 26.8–34.3)
MCHC RBC AUTO-ENTMCNC: 33.8 G/DL (ref 31.4–37.4)
MCV RBC AUTO: 88 FL (ref 82–98)
PHOSPHATE SERPL-MCNC: 4.5 MG/DL (ref 2.7–4.5)
PLATELET # BLD AUTO: 243 THOUSANDS/UL (ref 149–390)
PMV BLD AUTO: 10.9 FL (ref 8.9–12.7)
POTASSIUM SERPL-SCNC: 3.4 MMOL/L (ref 3.5–5.3)
PROT SERPL-MCNC: 7.6 G/DL (ref 6.4–8.2)
RBC # BLD AUTO: 5.21 MILLION/UL (ref 3.88–5.62)
SODIUM SERPL-SCNC: 139 MMOL/L (ref 136–145)
WBC # BLD AUTO: 7.38 THOUSAND/UL (ref 4.31–10.16)

## 2021-03-01 PROCEDURE — 99233 SBSQ HOSP IP/OBS HIGH 50: CPT | Performed by: INTERNAL MEDICINE

## 2021-03-01 PROCEDURE — 83735 ASSAY OF MAGNESIUM: CPT | Performed by: PHYSICIAN ASSISTANT

## 2021-03-01 PROCEDURE — 85027 COMPLETE CBC AUTOMATED: CPT | Performed by: PHYSICIAN ASSISTANT

## 2021-03-01 PROCEDURE — 99204 OFFICE O/P NEW MOD 45 MIN: CPT | Performed by: PHYSICIAN ASSISTANT

## 2021-03-01 PROCEDURE — 84100 ASSAY OF PHOSPHORUS: CPT | Performed by: PHYSICIAN ASSISTANT

## 2021-03-01 PROCEDURE — 80053 COMPREHEN METABOLIC PANEL: CPT | Performed by: PHYSICIAN ASSISTANT

## 2021-03-01 RX ORDER — PANTOPRAZOLE SODIUM 40 MG/1
40 INJECTION, POWDER, FOR SOLUTION INTRAVENOUS
Status: DISCONTINUED | OUTPATIENT
Start: 2021-03-02 | End: 2021-03-02 | Stop reason: HOSPADM

## 2021-03-01 RX ORDER — POTASSIUM CHLORIDE 14.9 MG/ML
20 INJECTION INTRAVENOUS ONCE
Status: COMPLETED | OUTPATIENT
Start: 2021-03-01 | End: 2021-03-01

## 2021-03-01 RX ORDER — ACETAMINOPHEN 325 MG/1
650 TABLET ORAL EVERY 6 HOURS PRN
Status: DISCONTINUED | OUTPATIENT
Start: 2021-03-01 | End: 2021-03-02 | Stop reason: HOSPADM

## 2021-03-01 RX ORDER — POTASSIUM CHLORIDE 14.9 MG/ML
20 INJECTION INTRAVENOUS
Status: COMPLETED | OUTPATIENT
Start: 2021-03-01 | End: 2021-03-01

## 2021-03-01 RX ORDER — ONDANSETRON 2 MG/ML
4 INJECTION INTRAMUSCULAR; INTRAVENOUS EVERY 6 HOURS PRN
Status: DISCONTINUED | OUTPATIENT
Start: 2021-03-01 | End: 2021-03-02 | Stop reason: HOSPADM

## 2021-03-01 RX ORDER — HEPARIN SODIUM 5000 [USP'U]/ML
5000 INJECTION, SOLUTION INTRAVENOUS; SUBCUTANEOUS EVERY 8 HOURS SCHEDULED
Status: DISCONTINUED | OUTPATIENT
Start: 2021-03-01 | End: 2021-03-01

## 2021-03-01 RX ORDER — SODIUM CHLORIDE 9 MG/ML
50 INJECTION, SOLUTION INTRAVENOUS CONTINUOUS
Status: DISCONTINUED | OUTPATIENT
Start: 2021-03-01 | End: 2021-03-02 | Stop reason: HOSPADM

## 2021-03-01 RX ADMIN — PIPERACILLIN AND TAZOBACTAM 3.38 G: 3; .375 INJECTION, POWDER, FOR SOLUTION INTRAVENOUS at 12:55

## 2021-03-01 RX ADMIN — POTASSIUM CHLORIDE 20 MEQ: 14.9 INJECTION, SOLUTION INTRAVENOUS at 10:26

## 2021-03-01 RX ADMIN — PIPERACILLIN AND TAZOBACTAM 3.38 G: 3; .375 INJECTION, POWDER, FOR SOLUTION INTRAVENOUS at 06:44

## 2021-03-01 RX ADMIN — POTASSIUM CHLORIDE 20 MEQ: 14.9 INJECTION, SOLUTION INTRAVENOUS at 17:16

## 2021-03-01 RX ADMIN — SODIUM CHLORIDE 125 ML/HR: 0.9 INJECTION, SOLUTION INTRAVENOUS at 00:23

## 2021-03-01 RX ADMIN — PIPERACILLIN AND TAZOBACTAM 3.38 G: 3; .375 INJECTION, POWDER, FOR SOLUTION INTRAVENOUS at 18:32

## 2021-03-01 RX ADMIN — SODIUM CHLORIDE 125 ML/HR: 0.9 INJECTION, SOLUTION INTRAVENOUS at 08:57

## 2021-03-01 RX ADMIN — PIPERACILLIN AND TAZOBACTAM 3.38 G: 3; .375 INJECTION, POWDER, FOR SOLUTION INTRAVENOUS at 00:30

## 2021-03-01 RX ADMIN — SODIUM CHLORIDE 125 ML/HR: 0.9 INJECTION, SOLUTION INTRAVENOUS at 00:41

## 2021-03-01 RX ADMIN — POTASSIUM CHLORIDE 20 MEQ: 14.9 INJECTION, SOLUTION INTRAVENOUS at 08:48

## 2021-03-01 NOTE — CONSULTS
Consultation -General Surgery  Michell Cardenas 36 y o  male MRN: [id number]  Unit/Bed#: 416-01 Encounter: [id number]        Inpatient consult to Acute Care Surgery  Consult performed by: Anamaria Cisneros PA-C  Consult ordered by: Moises Maldonado PA-C        ASSESSMENT/PLAN:  Problem List     * (Principal) Acute appendicitis    Ulcerative colitis ([facility] )    OMA (acute kidney injury) ([facility] )    Thrombocytosis ([facility] )    Immunocompromised state ([facility] )    Chronic right-sided low back pain with right-sided sciatica    Pain in scrotum    Hypokalemia      36 y o  M w/ hx of pan colitis on Humira (last dose yesterday) now found to have early signs of acute appendicitis  -sharp severe RLQ pain developed late 2/27, associated with nausea, not similar to prior flares  Currently pain much improved, pain with movement  Denies N/V, fever, chills  Last BM yesterday, baseline  -afebrile, bradycardic, other VSS  -remains without leukocytosis, hypokalemia 3 4, other labs WNL  -exam: soft, non-distended, tender in RLQ>LLQ, rebound tenderness, no guarding, rigidity  Plan:  -Given pain improvement and history/Humira use recommend for conservative management, will continue to closely monitor  -Continue NPO/IVF  -Continue Zosyn  -Replete hypokalemia  -PRN pain meds/antiemetic  -start DVT ppx  -Rest of care per primary    Reason for Consult / Principal Problem: Acute Appendicitis    HPI: Michell Cardenas is a 36y o  year old male with PMH of pan colitis on humira (last dose yesterday), rhabdomyolysis who presented to the ED on 2/28 with worsening abdominal pain x 1 day associated with N and constipation  He was found to be afebrile, without leukocytosis, CT performed revealing dilated appendix with minimal surrounding inflammatory changes  He was then admitted for observation and management of OMA and hypokalemia as well   Patient states that he developed localized, severe abrupt RLQ pain late on 2/27, described as sharp and not similar to prior colitis flares  He did attempt to go to bathroom shortly after with no BM/relief  He did not take anything for the pain  This AM, patient states he feels much better no longer nauseous, however, continues to have RLQ abdominal pain, worsened with movement, described as achy/sore, non-radiating  His last BM was yesterday morning, baseline, denies blood/diarrhea  Reports minimal chills yesterday AM  Has some pain in lower abdomen when starting urinary stream, denies dysuria, hematuria, urgency, hesitancy  Denies fever, chills, CP, SOB  Last colonoscopy was in 2019  He suspects both grandmothers to have had history of colon cancer  Review of Systems   Constitutional: Positive for activity change and appetite change  Negative for chills, diaphoresis, fatigue and fever  HENT: Negative  Eyes: Negative  Respiratory: Negative  Cardiovascular: Negative  Gastrointestinal: Positive for abdominal distention, abdominal pain and nausea  Negative for blood in stool, constipation, diarrhea and vomiting  Endocrine: Negative  Genitourinary: Negative  Musculoskeletal: Negative  Skin: Negative  Allergic/Immunologic: Negative  Neurological: Negative  Hematological: Negative  Psychiatric/Behavioral: Negative  Historical Information   Past Medical History:   Diagnosis Date    Rhabdomyolysis 2011     History reviewed  No pertinent surgical history    Social History   Social History     Substance and Sexual Activity   Alcohol Use Not Currently    Frequency: Monthly or less    Drinks per session: 1 or 2     Social History     Substance and Sexual Activity   Drug Use Never     Social History     Tobacco Use   Smoking Status Never Smoker   Smokeless Tobacco Never Used     Family History   Problem Relation Age of Onset    No Known Problems Mother     No Known Problems Father     Diabetes Paternal Grandfather        Meds/Allergies     Medications Prior to Admission   Medication    Humira Pen 40 MG/0 4ML PNKT    mesalamine (ROWASA) 4 g    omeprazole (PriLOSEC) 40 MG capsule     Current Facility-Administered Medications   Medication Dose Route Frequency    acetaminophen (TYLENOL) tablet 650 mg  650 mg Oral Q6H PRN    ondansetron (ZOFRAN) injection 4 mg  4 mg Intravenous Q6H PRN    piperacillin-tazobactam (ZOSYN) 3 375 g in sodium chloride 0 9 % 100 mL IVPB  3 375 g Intravenous Q6H    potassium chloride 20 mEq IVPB (premix)  20 mEq Intravenous Q2H    potassium chloride 20 mEq IVPB (premix)  20 mEq Intravenous Once    sodium chloride 0 9 % infusion  125 mL/hr Intravenous Continuous       Allergies   Allergen Reactions    Dilaudid [Hydromorphone]      Chest pain/pressure       Objective     Blood pressure 125/76, pulse (!) 50, temperature 97 6 °F (36 4 °C), temperature source Oral, resp  rate 16, height 6' 1" (1 854 m), weight 96 2 kg (212 lb 1 3 oz), SpO2 95 %  Intake/Output Summary (Last 24 hours) at 3/1/2021 0841  Last data filed at 3/1/2021 0747  Gross per 24 hour   Intake 0 ml   Output --   Net 0 ml       PHYSICAL EXAM  General appearance: alert and oriented, in no acute distress  Skin: Skin color, texture, turgor normal  No rashes or lesions  Head: Normocephalic, without obvious abnormality  Heart: regular rate and rhythm, S1, S2 normal, no murmur, click, rub or gallop  Lungs: clear to auscultation bilaterally  Abdomen: soft, non-distended, tender in RLQ>LLQ, rebound tenderness, no guarding, rigidity   BS x 4  Back: negative  Rectal: deferred  Neurological: normal without focal findings and mental status, speech normal, alert and oriented x3    Lab Results:   Admission on 02/28/2021   Component Date Value    Sodium 02/28/2021 140     Potassium 02/28/2021 3 4*    Chloride 02/28/2021 101     CO2 02/28/2021 33*    ANION GAP 02/28/2021 6     BUN 02/28/2021 18     Creatinine 02/28/2021 1 42*    Glucose 02/28/2021 78     Calcium 02/28/2021 8 9     AST 02/28/2021 18     ALT 02/28/2021 24  Alkaline Phosphatase 02/28/2021 103     Total Protein 02/28/2021 8 4*    Albumin 02/28/2021 4 4     Total Bilirubin 02/28/2021 0 60     eGFR 02/28/2021 66     WBC 02/28/2021 10 07     RBC 02/28/2021 5 48     Hemoglobin 02/28/2021 16 3     Hematocrit 02/28/2021 48 6     MCV 02/28/2021 89     MCH 02/28/2021 29 7     MCHC 02/28/2021 33 5     RDW 02/28/2021 12 7     MPV 02/28/2021 11 1     Platelets 48/88/3493 264     nRBC 02/28/2021 0     Neutrophils Relative 02/28/2021 66     Immat GRANS % 02/28/2021 0     Lymphocytes Relative 02/28/2021 23     Monocytes Relative 02/28/2021 7     Eosinophils Relative 02/28/2021 3     Basophils Relative 02/28/2021 1     Neutrophils Absolute 02/28/2021 6 67     Immature Grans Absolute 02/28/2021 0 01     Lymphocytes Absolute 02/28/2021 2 32     Monocytes Absolute 02/28/2021 0 73     Eosinophils Absolute 02/28/2021 0 26     Basophils Absolute 02/28/2021 0 08     Lipase 02/28/2021 97     Sodium 03/01/2021 139     Potassium 03/01/2021 3 4*    Chloride 03/01/2021 104     CO2 03/01/2021 26     ANION GAP 03/01/2021 9     BUN 03/01/2021 17     Creatinine 03/01/2021 1 20     Glucose 03/01/2021 95     Calcium 03/01/2021 8 6     AST 03/01/2021 12     ALT 03/01/2021 23     Alkaline Phosphatase 03/01/2021 92     Total Protein 03/01/2021 7 6     Albumin 03/01/2021 3 8     Total Bilirubin 03/01/2021 0 50     eGFR 03/01/2021 81     Magnesium 03/01/2021 2 3     Phosphorus 03/01/2021 4 5     WBC 03/01/2021 7 38     RBC 03/01/2021 5 21     Hemoglobin 03/01/2021 15 4     Hematocrit 03/01/2021 45 6     MCV 03/01/2021 88     MCH 03/01/2021 29 6     MCHC 03/01/2021 33 8     RDW 03/01/2021 12 7     Platelets 75/50/4483 243     MPV 03/01/2021 10 9      Imaging Studies: I have personally reviewed pertinent reports  Counseling / Coordination of Care  Total time spent today  20 minutes   Greater than 50% of total time was spent with the patient and / or family counseling and / or coordination of care

## 2021-03-01 NOTE — ASSESSMENT & PLAN NOTE
History and imaging findings suspicious for acute appendicitis - CT with a dilated appendix and minimal surrounding inflammatory changes  Patient is on Humira chronically, and at risk for infection with increased severity    Continue broad-spectrum antibiotic coverage with Pip-Alverto  As pain and symptoms have improved, and patient is not nauseous, will advance to clear liquid diet  Continue conservative management with IV fluids, antibiotics, and antiemetics  Surgery has been consulted and following

## 2021-03-01 NOTE — PLAN OF CARE
Problem: PAIN - ADULT  Goal: Verbalizes/displays adequate comfort level or baseline comfort level  Description: Interventions:  - Encourage patient to monitor pain and request assistance  - Assess pain using appropriate pain scale  - Administer analgesics based on type and severity of pain and evaluate response  - Implement non-pharmacological measures as appropriate and evaluate response  - Consider cultural and social influences on pain and pain management  - Notify physician/advanced practitioner if interventions unsuccessful or patient reports new pain  Outcome: Progressing     Problem: INFECTION - ADULT  Goal: Absence or prevention of progression during hospitalization  Description: INTERVENTIONS:  - Assess and monitor for signs and symptoms of infection  - Monitor lab/diagnostic results  - Monitor all insertion sites, i e  indwelling lines, tubes, and drains  - Monitor endotracheal if appropriate and nasal secretions for changes in amount and color  - [place] appropriate cooling/warming therapies per order  - Administer medications as ordered  - Instruct and encourage patient and family to use good hand hygiene technique  - Identify and instruct in appropriate isolation precautions for identified infection/condition  Outcome: Progressing     Problem: SAFETY ADULT  Goal: Patient will remain free of falls  Description: INTERVENTIONS:  - Assess patient frequently for physical needs  -  Identify cognitive and physical deficits and behaviors that affect risk of falls    -  [place] fall precautions as indicated by assessment   - Educate patient/family on patient safety including physical limitations  - Instruct patient to call for assistance with activity based on assessment  - Modify environment to reduce risk of injury  - Consider OT/PT consult to assist with strengthening/mobility  Outcome: Progressing  Goal: Maintain or return to baseline ADL function  Description: INTERVENTIONS:  -  Assess patient's ability to carry out ADLs; assess patient's baseline for ADL function and identify physical deficits which impact ability to perform ADLs (bathing, care of mouth/teeth, toileting, grooming, dressing, etc )  - Assess/evaluate cause of self-care deficits   - Assess range of motion  - Assess patient's mobility; develop plan if impaired  - Assess patient's need for assistive devices and provide as appropriate  - Encourage maximum independence but intervene and supervise when necessary  - Involve family in performance of ADLs  - Assess for home care needs following discharge   - Consider OT consult to assist with ADL evaluation and planning for discharge  - Provide patient education as appropriate  Outcome: Progressing  Goal: Maintain or return mobility status to optimal level  Description: INTERVENTIONS:  - Assess patient's baseline mobility status (ambulation, transfers, stairs, etc )    - Identify cognitive and physical deficits and behaviors that affect mobility  - Identify mobility aids required to assist with transfers and/or ambulation (gait belt, sit-to-stand, lift, walker, cane, etc )  - [place] fall precautions as indicated by assessment  - Record patient progress and toleration of activity level on Mobility SBAR; progress patient to next Phase/Stage  - Instruct patient to call for assistance with activity based on assessment  - Consider rehabilitation consult to assist with strengthening/weightbearing, etc   Outcome: Progressing     Problem: DISCHARGE PLANNING  Goal: Discharge to home or other facility with appropriate resources  Description: INTERVENTIONS:  - Identify barriers to discharge w/patient and caregiver  - Arrange for needed discharge resources and transportation as appropriate  - Identify discharge learning needs (meds, wound care, etc )  - Arrange for interpretive services to assist at discharge as needed  - Refer to Case Management Department for coordinating discharge planning if the patient needs post-hospital services based on physician/advanced practitioner order or complex needs related to functional status, cognitive ability, or social support system  Outcome: Progressing     Problem: Knowledge Deficit  Goal: Patient/family/caregiver demonstrates understanding of disease process, treatment plan, medications, and discharge instructions  Description: Complete learning assessment and assess knowledge base    Interventions:  - Provide teaching at level of understanding  - Provide teaching via preferred learning methods  Outcome: Progressing

## 2021-03-01 NOTE — ASSESSMENT & PLAN NOTE
AK in setting of decreased p o  Intake and acute illness, likely represents prerenal etiology  Inital creatinine of 1 42, improved to 1 2 this morning  OMA appears resolved  Continue IV fluids for now

## 2021-03-01 NOTE — TELEPHONE ENCOUNTER
Called and spoke with the patient  We discussed his current situation  Will see him tomorrow in [place]

## 2021-03-01 NOTE — ED PROVIDER NOTES
EMERGENCY DEPARTMENT ENCOUNTER NOTE    This note has been generated using a voice recognition software  There may be typographic, grammatic, or word substitution errors that have escaped editorial review  ? CHIEF COMPLAINT  Chief Complaint   Patient presents with    Abdominal Pain     RLQ intermittent pain; patient states his abdomen feels swollen; patient states he feels like he may be constipated; denies nausea, vomiting        HPI  Tawny Hernandez is a 36 y o  male with PMH of ulcerative colitis on Humira presenting with RLQ abdominal pain  Patient first noticed pain last night  It was worsened with movement including standing up or stretching back in bed  He has a sensation of fullness/swelling in Right lower quadrant  Patient did have chills around 6:00 a m  this morning as well as nausea  No vomiting  No fevers  No dysuria  Pain does occasionally radiate down to right groin with a spasm like sensation  Symptoms today are different than the pain that patient gets when he has ulcerative colitis flare up  During those flare ups, he usually has left-sided abdominal pain that is sharp and spasm like  No prior abdominal surgery  Patient  lifts 50 lb objects at work  He denies any bulges and right lower quadrant or groin other than the sensation of swelling  There is no dysuria or hematuria  REVIEW OF SYSTEMS  Constitutional: ?Denies fevers, reports chills  Eyes: No changes in vision  ENT: No runny nose, no sore throat  CV: No chest pain   Resp: No  shortness of breath or coughing  GI:  As above  : No urinary frequency, urgency, or hematuria  Rheumatologic:  Ulcerative colitis, on Humira  Neuro: No headaches  Ten systems were reviewed otherwise were unremarkable    PAST MEDICAL HISTORY  Past Medical History:   Diagnosis Date    Rhabdomyolysis 2011       SURGICAL HISTORY  History reviewed  No pertinent surgical history      FAMILY HISTORY  Family History   Problem Relation Age of Onset    No Known Problems Mother     No Known Problems Father     Diabetes Paternal Grandfather         CURRENT MEDICATIONS  No current facility-administered medications on file prior to encounter  Current Outpatient Medications on File Prior to Encounter   Medication Sig    Humira Pen 40 MG/0 4ML PNKT Inject 40 mg under the skin every 2 weeks      mesalamine (ROWASA) 4 g Insert 60 mL (4 g total) into the rectum daily at bedtime (Patient not taking: Reported on 8/20/2020)    omeprazole (PriLOSEC) 40 MG capsule Take 1 capsule (40 mg total) by mouth daily before breakfast (Patient not taking: Reported on 8/20/2020)       ALLERGIES  Allergies   Allergen Reactions    Dilaudid [Hydromorphone]      Chest pain/pressure       SOCIAL HISTORY  Social History     Socioeconomic History    Marital status: Single     Spouse name: None    Number of children: None    Years of education: None    Highest education level: None   Occupational History    None   Social Needs    Financial resource strain: None    Food insecurity     Worry: None     Inability: None    Transportation needs     Medical: None     Non-medical: None   Tobacco Use    Smoking status: Never Smoker    Smokeless tobacco: Never Used   Substance and Sexual Activity    Alcohol use: Not Currently     Frequency: Monthly or less     Drinks per session: 1 or 2    Drug use: Never    Sexual activity: Not Currently   Lifestyle    Physical activity     Days per week: None     Minutes per session: None    Stress: None   Relationships    Social connections     Talks on phone: None     Gets together: None     Attends Quaker service: None     Active member of club or organization: None     Attends meetings of clubs or organizations: None     Relationship status: None    Intimate partner violence     Fear of current or ex partner: None     Emotionally abused: None     Physically abused: None     Forced sexual activity: None   Other Topics Concern    None Social History Narrative    None       PHYSICAL EXAM    /73 (BP Location: Right arm)   Pulse 80   Temp 97 7 °F (36 5 °C) (Temporal)   Resp 18   Ht 6' 1" (1 854 m)   Wt 99 5 kg (219 lb 5 7 oz)   SpO2 96%   BMI 28 94 kg/m²   Vital signs and nursing notes reviewed  CONSTITUTIONAL: male appearing stated age resting in bed, in no acute distress  HEENT: atraumatic, normocephalic  Sclera anicteric, conjunctiva are not injected  Moist oral mucosa  CARDIOVASCULAR/CHEST: RRR, no M/R/G  2+ radial pulses  PULMONARY: Breathing comfortably on RA  Breath sounds are equal and clear to auscultation  ABDOMEN: non-distended  BS present, normoactive  Tender to palpation in right lower quadrant over McBurney's point  :  Exam performed in presence of chaperone, Dr Andrew Jovel  Exam reveals unremarkable external male genitalia, external inguinal rings are patent but without bowel palpated with Valsalva  Normal testicular lie  No inguinal lymphadenopathy     MSK: moves all extremities, no deformities, no peripheral edema  NEURO: Awake, alert, and oriented x 3  Face symmetric  Moves all extremities spontaneously  No focal neurologic deficits  SKIN: Warm, appears well-perfused  MENTAL STATUS: Normal affect      ?   LABS AND TESTS    Results Reviewed     Procedure Component Value Units Date/Time    Comprehensive metabolic panel [[id number]]  (Abnormal) Collected: 02/28/21 2037    Lab Status: Final result Specimen: Blood from Arm, Right Updated: 02/28/21 2100     Sodium 140 mmol/L      Potassium 3 4 mmol/L      Chloride 101 mmol/L      CO2 33 mmol/L      ANION GAP 6 mmol/L      BUN 18 mg/dL      Creatinine 1 42 mg/dL      Glucose 78 mg/dL      Calcium 8 9 mg/dL      AST 18 U/L      ALT 24 U/L      Alkaline Phosphatase 103 U/L      Total Protein 8 4 g/dL      Albumin 4 4 g/dL      Total Bilirubin 0 60 mg/dL      eGFR 66 ml/min/1 73sq m     Narrative:      [place] guidelines for Chronic Kidney Disease (CKD):     Stage 1 with normal or high GFR (GFR > 90 mL/min/1 73 square meters)    Stage 2 Mild CKD (GFR = 60-89 mL/min/1 73 square meters)    Stage 3A Moderate CKD (GFR = 45-59 mL/min/1 73 square meters)    Stage 3B Moderate CKD (GFR = 30-44 mL/min/1 73 square meters)    Stage 4 Severe CKD (GFR = 15-29 mL/min/1 73 square meters)    Stage 5 End Stage CKD (GFR <15 mL/min/1 73 square meters)  Note: GFR calculation is accurate only with a steady state creatinine    Lipase [[id number]]  (Normal) Collected: 02/28/21 2037    Lab Status: Final result Specimen: Blood from Arm, Right Updated: 02/28/21 2054     Lipase 97 u/L     CBC and differential [[id number]] Collected: 02/28/21 2037    Lab Status: Final result Specimen: Blood from Arm, Right Updated: 02/28/21 2045     WBC 10 07 Thousand/uL      RBC 5 48 Million/uL      Hemoglobin 16 3 g/dL      Hematocrit 48 6 %      MCV 89 fL      MCH 29 7 pg      MCHC 33 5 g/dL      RDW 12 7 %      MPV 11 1 fL      Platelets 938 Thousands/uL      nRBC 0 /100 WBCs      Neutrophils Relative 66 %      Immat GRANS % 0 %      Lymphocytes Relative 23 %      Monocytes Relative 7 %      Eosinophils Relative 3 %      Basophils Relative 1 %      Neutrophils Absolute 6 67 Thousands/µL      Immature Grans Absolute 0 01 Thousand/uL      Lymphocytes Absolute 2 32 Thousands/µL      Monocytes Absolute 0 73 Thousand/µL      Eosinophils Absolute 0 26 Thousand/µL      Basophils Absolute 0 08 Thousands/µL     UA w Reflex to Microscopic w Reflex to Culture [[id number]]     Lab Status: No result Specimen: Urine           CT abdomen pelvis with contrast   Final Result by Trevor Abdullahi DO (02/28 2156)      Dilated appendix with minimal surrounding inflammatory changes  Findings are suspicious for early acute appendicitis               I personally discussed this study with Mason Gonzalez on 2/28/2021 at 9:33 PM                      Workstation performed: [id number]             ED COURSE & MEDICAL DECISION MAKING  Procedures             Medications   iohexol (OMNIPAQUE) 350 MG/ML injection (MULTI-DOSE) 100 mL (100 mL Intravenous Given 2/28/21 240)     53-year-old male with history of ulcerative colitis presenting with right lower quadrant abdominal pain over the past 1 day  Vital signs reviewed, afebrile and within normal limits  Differential diagnosis includes acute appendicitis, nephrolithiasis, urinary tract infection, inguinal hernia, referred pain due to  source such as epididymo-orchitis, with other etiologies including atypical ulcerative colitis flare up, another form of colitis, etc   Patient declines anything for pain at present  Labs obtained, revealing CMP with creatinine of 1 42, slightly elevated above prior of 1 19  Lipase is not consistent with acute pancreatitis, CBCs without leukocytosis  CT abdomen/pelvis with contrast obtained, revealing findings concerning for early acute appendicitis  Case discussed with Dr Jose Ledezma (General Surgery)  From Dr Jose Ledezma recommends admission to Internal Medicine and patient will be seen by General surgery in the morning  He recommends starting patient on Zosyn  At this time, patient is already admitted to [facility], I relate this recommendation to my Internal Medicine counterpart      MDM  Number of Diagnoses or Management Options  RLQ abdominal pain: new and requires workup     Amount and/or Complexity of Data Reviewed  Clinical lab tests: ordered and reviewed  Tests in the radiology section of CPT®: reviewed  Review and summarize past medical records: yes  Discuss the patient with other providers: yes (Surgery, [facility])    Risk of Complications, Morbidity, and/or Mortality  Presenting problems: high  Diagnostic procedures: high  Management options: high    Patient Progress  Patient progress: stable      CLINICAL IMPRESSION  Final diagnoses:   RLQ abdominal pain       DISPOSITION  Time reflects when diagnosis was documented in both MDM as applicable and the Disposition within this note     Time User Action Codes Description Comment    2/28/2021 11:42 PM Maria G Ball Add [R10 31] RLQ abdominal pain     2/28/2021 11:51 PM Mateus Thurston Add [K35 80] Acute appendicitis, unspecified acute appendicitis type     3/2/2021 10:57 AM Valeriy Nolan Add [K51 90] Ulcerative colitis without complications, unspecified location [facility])       ED Disposition     ED Disposition Condition Date/Time Comment    Admit Stable Sun Feb 28, 2021 11:42 PM Case was discussed with Cindy Ly PA-C and the patient's admission status was agreed to be Admission Status: observation status to the service of Dr Chapo Alvarado MD  03/02/21 8934

## 2021-03-01 NOTE — ASSESSMENT & PLAN NOTE
He present to the ER with complaints of abdominal pain  Pain more likely secondary to acute appendicitis   No evidence of UC flare  Continue PTA medication  Continue outpatient GI follow up

## 2021-03-01 NOTE — H&P
[facility] Internal Medicine  H&P- Didi Short 1990, 36 y o  male MRN: [id number]    Unit/Bed#: 416-01 Encounter: [id number]    Primary Care Provider: Jamal Bourne DO   Date and time admitted to hospital: 2/28/2021  7:37 PM        * Acute appendicitis  Assessment & Plan  Presented to the ER with complaints of right lower quadrant  abdominal pain  CT shows-Dilated appendix with minimal surrounding inflammatory changes   Findings are suspicious for early acute appendicitis  ER attending discussed case with Dr Sue Aschoff (general surgery)  Admit on observation  NPO  IV fluids  Initiate IV zosyn  Pain medication PRN  Zofran PRN  Monitor for new onset fever, nausea,vomiting  Am labs  General surgery consult  Supportive care    OMA (acute kidney injury) ([facility] [address] )  Assessment & Plan  Creatine levels at 1 42  Baseline appears to be between 1 1 and 1 2  Avoid nephrotoxic agents  IV fluids  Monitor Urinary output  Monitor Electrolytes  Check AM CMP  Nephrology consult if no improvement    Ulcerative colitis ([facility] [address] )  Assessment & Plan  He present to the ER with complaints of abdominal pain  Pain more likely secondary to acute appendicitis   No evidence of UC flare  Continue PTA medication  Continue outpatient GI follow up    Hypokalemia  Assessment & Plan  Potassium mildly decreased at 3 4  Replace potassium  Monitor potassium levels    VTE Prophylaxis: Possible OR in the AM  / sequential compression device   Code Status: Level 1- Full code  POLST: POLST is not applicable to this patient  Discussion with family: None    Anticipated Length of Stay:  Patient will be admitted on an Observation basis with an anticipated length of stay of  < 2 midnights  Justification for Hospital Stay:  Acute appendicitis, acute kidney injury, hypokalemia    Total Time for Visit, including Counseling / Coordination of Care: 30 minutes    Greater than 50% of this total time spent on direct patient counseling and coordination of care     Chief Complaint:   Abdominal pain    History of Present Illness:    Mac Taylor is a 36 y o  male who presents to the ER with complaints of abdominal pain X 1 day getting progressively worst  Pain is associated with nausea he states that he also feels constipated  He denies chest pain, SOB, cough, fever, chills, vomiting, diarrhea urinary symptoms  Labs completed in the ER with results as shown below  CT abdomen and pelvis completed with results as shown below  ER Attending discussed case with Dr Eulalio Zepeda  (general surgery)  At bedside patient is awake and alert, no acute distress  Patient is being admitted on observation status med surg level car for further management of Acute appendicitis, OMA, Hypokalemia  Review of Systems:    Review of Systems   Constitutional: Positive for appetite change  Negative for chills, diaphoresis, fatigue and fever  HENT: Negative for congestion  Eyes: Negative for photophobia and visual disturbance  Respiratory: Negative for cough, chest tightness, shortness of breath and wheezing  Cardiovascular: Negative for chest pain, palpitations and leg swelling  Gastrointestinal: Positive for abdominal pain and nausea  Negative for constipation, diarrhea and vomiting  Endocrine: Negative for polydipsia, polyphagia and polyuria  Genitourinary: Negative for difficulty urinating, dysuria, flank pain, frequency and hematuria  Musculoskeletal: Negative for arthralgias, back pain, gait problem and joint swelling  Skin: Negative for color change, pallor, rash and wound  Neurological: Negative for dizziness, weakness, numbness and headaches  Psychiatric/Behavioral: Negative for agitation and confusion  The patient is not nervous/anxious  Past Medical and Surgical History:     Past Medical History:   Diagnosis Date    Rhabdomyolysis 2011       History reviewed  No pertinent surgical history      Meds/Allergies:    Prior to Admission medications Medication Sig Start Date End Date Taking? Authorizing Provider   Humira Pen 40 MG/0 4ML PNKT Inject 40 mg under the skin every 2 weeks  9/2/20  Yes Yadira Brito PA-C   mesalamine (ROWASA) 4 g Insert 60 mL (4 g total) into the rectum daily at bedtime  Patient not taking: Reported on 8/20/2020 12/6/19   Tiffanie Ortega PA-C   omeprazole (PriLOSEC) 40 MG capsule Take 1 capsule (40 mg total) by mouth daily before breakfast  Patient not taking: Reported on 8/20/2020 7/13/20   Donell Reed MD     I have reviewed home medications with patient personally  Allergies: Allergies   Allergen Reactions    Dilaudid [Hydromorphone]      Chest pain/pressure       Social History:     Marital Status: Single   Occupation: Construction  Patient Pre-hospital Living Situation: Lives with family  Patient Pre-hospital Level of Mobility: Active  Patient Pre-hospital Diet Restrictions: None reported  Substance Use History:   Social History     Substance and Sexual Activity   Alcohol Use Not Currently    Frequency: Monthly or less    Drinks per session: 1 or 2     Social History     Tobacco Use   Smoking Status Never Smoker   Smokeless Tobacco Never Used     Social History     Substance and Sexual Activity   Drug Use Never       Family History:    Family History   Problem Relation Age of Onset    No Known Problems Mother     No Known Problems Father     Diabetes Paternal Grandfather        Physical Exam:     Vitals:   Blood Pressure: 131/77 (03/01/21 0023)  Pulse: 80 (03/01/21 0023)  Temperature: 98 3 °F (36 8 °C) (03/01/21 0023)  Temp Source: Oral (03/01/21 0023)  Respirations: 16 (03/01/21 0023)  Height: 6' 1" (185 4 cm) (03/01/21 0023)  Weight - Scale: 96 2 kg (212 lb) (03/01/21 0023)  SpO2: 97 % (03/01/21 0023)    Physical Exam  Vitals signs reviewed  Constitutional:       Appearance: He is not ill-appearing or diaphoretic  Eyes:      Pupils: Pupils are equal, round, and reactive to light     Neck: Musculoskeletal: Normal range of motion and neck supple  Cardiovascular:      Rate and Rhythm: Normal rate and regular rhythm  Pulses: Normal pulses  Pulmonary:      Effort: No respiratory distress  Breath sounds: No wheezing, rhonchi or rales  Abdominal:      General: There is no distension  Palpations: Abdomen is soft  Tenderness: There is abdominal tenderness  Musculoskeletal: Normal range of motion  General: No tenderness  Right lower leg: No edema  Left lower leg: No edema  Skin:     General: Skin is warm  Capillary Refill: Capillary refill takes less than 2 seconds  Coloration: Skin is not pale  Findings: No bruising, erythema, lesion or rash  Neurological:      Mental Status: He is alert and oriented to person, place, and time  Psychiatric:         Mood and Affect: Mood normal            Additional Data:     Lab Results: I have personally reviewed pertinent reports  Results from last 7 days   Lab Units 02/28/21 2037   WBC Thousand/uL 10 07   HEMOGLOBIN g/dL 16 3   HEMATOCRIT % 48 6   PLATELETS Thousands/uL 264   NEUTROS PCT % 66   LYMPHS PCT % 23   MONOS PCT % 7   EOS PCT % 3     Results from last 7 days   Lab Units 02/28/21 2037   SODIUM mmol/L 140   POTASSIUM mmol/L 3 4*   CHLORIDE mmol/L 101   CO2 mmol/L 33*   BUN mg/dL 18   CREATININE mg/dL 1 42*   ANION GAP mmol/L 6   CALCIUM mg/dL 8 9   ALBUMIN g/dL 4 4   TOTAL BILIRUBIN mg/dL 0 60   ALK PHOS U/L 103   ALT U/L 24   AST U/L 18   GLUCOSE RANDOM mg/dL 78                       Imaging: I have personally reviewed pertinent reports  CT abdomen pelvis with contrast   Final Result by Trevor Abdullahi DO (02/28 2156)      Dilated appendix with minimal surrounding inflammatory changes  Findings are suspicious for early acute appendicitis               I personally discussed this study with Mason Gonzalez on 2/28/2021 at 9:33 PM                      Workstation performed: [id number] EKG, Pathology, and Other Studies Reviewed on Admission:   · EKG:     Allscripts / Epic Records Reviewed: Yes     ** Please Note: This note has been constructed using a voice recognition system   **

## 2021-03-01 NOTE — TELEPHONE ENCOUNTER
Patients GI provider:  Dr Tia De La Rosa    Number to return call: [phone]    Reason for call: Pt calling stating he is in hospital and wants to know if it would be good to have an appendectomy or not? Surgeon will be coming to talk to pt around noon      Scheduled procedure/appointment date if applicable: N/A

## 2021-03-01 NOTE — ASSESSMENT & PLAN NOTE
Presented to the ER with complaints of right lower quadrant  abdominal pain  CT shows-Dilated appendix with minimal surrounding inflammatory changes   Findings are suspicious for early acute appendicitis     ER attending discussed case with Dr Beatrice Walker (general surgery)  Admit on observation  NPO  IV fluids  Initiate IV zosyn  Pain medication PRN  Zofran PRN  Monitor for new onset fever, nausea,vomiting  Am labs  General surgery consult  Supportive care

## 2021-03-01 NOTE — PROGRESS NOTES
Progress Note - Mac Taylor 1990, 36 y o  male MRN: [id number]    Unit/Bed#: 416-01 Encounter: [id number]    Primary Care Provider: Tao Hillman DO   Date and time admitted to hospital: 2/28/2021  7:37 PM        * Acute appendicitis  Assessment & Plan  History and imaging findings suspicious for acute appendicitis - CT with a dilated appendix and minimal surrounding inflammatory changes  Patient is on Humira chronically, and at risk for infection with increased severity    Continue broad-spectrum antibiotic coverage with Pip-Alverto  As pain and symptoms have improved, and patient is not nauseous, will advance to clear liquid diet  Continue conservative management with IV fluids, antibiotics, and antiemetics  Surgery has been consulted and following  Ulcerative colitis ([facility] )  Assessment & Plan  Prior history of IBD, chronically maintained on Humira  Reports current symptoms are not typical of previous UC flares  OMA (acute kidney [facility]) ([facility] )  Assessment & Plan  AK in setting of decreased p o  Intake and acute illness, likely represents prerenal etiology  Inital creatinine of 1 42, improved to 1 2 this morning  OMA appears resolved  Continue IV fluids for now  Hypokalemia  Assessment & Plan  Replete as needed  VTE Pharmacologic Prophylaxis:   Pharmacologic: Enoxaparin (Lovenox)  Mechanical VTE Prophylaxis in Place: Yes    Patient Centered Rounds: I have performed bedside rounds with nursing staff today  Discussions with Specialists or Other Care Team Provider: Surgery    Education and Discussions with Family / Patient: Yes    Time Spent for Care: 30 minutes  More than 50% of total time spent on counseling and coordination of care as described above      Current Length of Stay: 0 day(s)    Current Patient Status: Observation   Certification Statement: The patient will continue to require additional inpatient hospital stay due to Continued monitoring of potential acute appendicitis  Discharge Plan:  TBD    Code Status: Level 1 - Full Code      Subjective:   Patient seen and examined - reports significant improvement in pain, essential resolution of nausea  No overnight events were reported  Remains afebrile  Objective:     Vitals:   Temp (24hrs), Av 9 °F (36 6 °C), Min:97 6 °F (36 4 °C), Max:98 3 °F (36 8 °C)    Temp:  [97 6 °F (36 4 °C)-98 3 °F (36 8 °C)] 97 6 °F (36 4 °C)  HR:  [50-90] 50  Resp:  [16-20] 16  BP: (116-142)/(69-82) 125/76  SpO2:  [95 %-98 %] 95 %  Body mass index is 27 98 kg/m²  Input and Output Summary (last 24 hours): Intake/Output Summary (Last 24 hours) at 3/1/2021 1843  Last data filed at 3/1/2021 1501  Gross per 24 hour   Intake 1980 ml   Output --   Net 1980 ml       Physical Exam:     Physical Exam  Constitutional:       General: He is not in acute distress  Appearance: Normal appearance  He is not toxic-appearing  Neck:      Musculoskeletal: Neck supple  Cardiovascular:      Rate and Rhythm: Normal rate and regular rhythm  Heart sounds: No murmur  No friction rub  No gallop  Pulmonary:      Effort: Pulmonary effort is normal  No respiratory distress  Breath sounds: Normal breath sounds  No wheezing or rhonchi  Abdominal:      General: Abdomen is flat  Bowel sounds are normal  There is no distension  Palpations: Abdomen is soft  Tenderness: There is abdominal tenderness  There is no guarding or rebound  Comments: RLQ tenderness with deep palpation  Abdomen is not acute  Musculoskeletal:         General: No swelling or tenderness  Neurological:      General: No focal deficit present  Mental Status: He is oriented to person, place, and time  Psychiatric:         Mood and Affect: Mood normal          Behavior: Behavior normal          Thought Content:  Thought content normal          Judgment: Judgment normal         Additional Data:     Labs:    Results from last 7 days   Lab Units 03/01/21 0437 02/28/21  2037   WBC Thousand/uL 7 38 10 07   HEMOGLOBIN g/dL 15 4 16 3   HEMATOCRIT % 45 6 48 6   PLATELETS Thousands/uL 243 264   NEUTROS PCT %  --  66   LYMPHS PCT %  --  23   MONOS PCT %  --  7   EOS PCT %  --  3     Results from last 7 days   Lab Units 03/01/21  0437   SODIUM mmol/L 139   POTASSIUM mmol/L 3 4*   CHLORIDE mmol/L 104   CO2 mmol/L 26   BUN mg/dL 17   CREATININE mg/dL 1 20   ANION GAP mmol/L 9   CALCIUM mg/dL 8 6   ALBUMIN g/dL 3 8   TOTAL BILIRUBIN mg/dL 0 50   ALK PHOS U/L 92   ALT U/L 23   AST U/L 12   GLUCOSE RANDOM mg/dL 95                           * I Have Reviewed All Lab Data Listed Above  * Additional Pertinent Lab Tests Reviewed: All Labs Within Last 24 Hours Reviewed    Imaging:    Imaging Reports Reviewed Today Include: CT a/p    Recent Cultures (last 7 days):           Last 24 Hours Medication List:   Current Facility-Administered Medications   Medication Dose Route Frequency Provider Last Rate    acetaminophen  650 mg Oral Q6H PRN Mateus Thurston PA-C      heparin (porcine)  5,000 Units Subcutaneous [facility] Stephanie Tabares PA-C      ondansetron  4 mg Intravenous Q6H PRN Mateus Thurston PA-C      [START ON 3/2/2021] pantoprazole  40 mg Intravenous Q24H [facility] Aundrea Betancourt MD      piperacillin-tazobactam  3 375 g Intravenous Q6H Mateus Thurston PA-C 3 375 g (03/01/21 1832)    potassium chloride  20 mEq Intravenous Once Aundrea Betancourt MD 20 mEq (03/01/21 1716)    sodium chloride  100 mL/hr Intravenous Continuous Aundrea Betancourt  mL/hr (03/01/21 0857)        Today, Patient Was Seen By: Aundrea Betancourt MD    ** Please Note: Dictation voice to text software may have been used in the creation of this document   **

## 2021-03-01 NOTE — PLAN OF CARE
Problem: PAIN - ADULT  Goal: Verbalizes/displays adequate comfort level or baseline comfort level  Description: Interventions:  - Encourage patient to monitor pain and request assistance  - Assess pain using appropriate pain scale (0-10 pain scale)  - Administer analgesics based on type and severity of pain and evaluate response  - Implement non-pharmacological measures as appropriate and evaluate response  - Consider cultural and social influences on pain and pain management  - Notify physician/advanced practitioner if interventions unsuccessful or patient reports new pain  Outcome: Progressing     Problem: INFECTION - ADULT  Goal: Absence or prevention of progression during hospitalization  Description: INTERVENTIONS:  - Assess and monitor for signs and symptoms of infection  - Monitor lab/diagnostic results  - Monitor all insertion sites, i e  indwelling lines  - Administer medications as ordered  - Instruct and encourage patient and family to use good hand hygiene technique  Outcome: Progressing     Problem: SAFETY ADULT  Goal: Patient will remain free of falls  Description: INTERVENTIONS:  - Assess patient frequently for physical needs  -  Identify cognitive and physical deficits and behaviors that affect risk of falls    -  [place] fall precautions as indicated by assessment  (moderate fall risk)  - Educate patient/family on patient safety including physical limitations  - Instruct patient to call for assistance with activity based on assessment  - Modify environment to reduce risk of injury  - Consider OT/PT consult to assist with strengthening/mobility  Outcome: Progressing  Goal: Maintain or return to baseline ADL function  Description: INTERVENTIONS:  -  Assess patient's ability to carry out ADLs; (independent)   - Assess range of motion  - Assess patient's mobility; (independent)  - Encourage maximum independence but intervene and supervise when necessary  - Assess for home care needs following discharge   - Consider OT consult to assist with ADL evaluation and planning for discharge  - Provide patient education as appropriate  Outcome: Progressing  Goal: Maintain or return mobility status to optimal level  Description: INTERVENTIONS:  - Assess patient's baseline mobility status (independent)    - Identify cognitive and physical deficits and behaviors that affect mobility  - [place] fall precautions as indicated by assessment (moderate fall risk)  - Record patient progress and toleration of activity level on Mobility SBAR; progress patient to next Phase/Stage  - Instruct patient to call for assistance with activity based on assessment  - Consider rehabilitation consult to assist with strengthening/weightbearing, etc   Outcome: Progressing     Problem: DISCHARGE PLANNING  Goal: Discharge to home or other facility with appropriate resources  Description: INTERVENTIONS:  - Identify barriers to discharge w/patient and caregiver  - Arrange for needed discharge resources and transportation as appropriate  - Identify discharge learning needs (meds, wound care, etc )  - Refer to Case Management Department for coordinating discharge planning if the patient needs post-hospital services based on physician/advanced practitioner order or complex needs related to functional status, cognitive ability, or social support system  Outcome: Progressing     Problem: Knowledge Deficit  Goal: Patient/family/caregiver demonstrates understanding of disease process, treatment plan, medications, and discharge instructions  Description: Complete learning assessment and assess knowledge base    Interventions:  - Provide teaching at level of understanding  - Provide teaching via preferred learning methods  Outcome: Progressing     Problem: GASTROINTESTINAL - ADULT  Goal: Minimal or absence of nausea and/or vomiting  Description: INTERVENTIONS:  - Administer IV fluids if ordered to ensure adequate hydration  - Maintain NPO status until nausea and vomiting are resolved  - Administer ordered antiemetic medications as needed  - Provide nonpharmacologic comfort measures as appropriate  - Advance diet as tolerated,(clear liquid diet)  - Consider nutrition services referral to assist patient with adequate nutrition and appropriate food choices  Outcome: Progressing  Goal: Maintains or returns to baseline bowel function  Description: INTERVENTIONS:  - Assess bowel function  - Encourage oral fluids to ensure adequate hydration  - Administer IV fluids if ordered to ensure adequate hydration  - Administer ordered medications as needed  - Encourage mobilization and activity  - Consider nutritional services referral to assist patient with adequate nutrition and appropriate food choices  Outcome: Progressing  Goal: Maintains adequate nutritional intake  Description: INTERVENTIONS:  - Monitor percentage of each meal consumed  - Identify factors contributing to decreased intake, treat as appropriate  - Assist with meals as needed  - Monitor I&O, weight, and lab values if indicated  - Obtain nutrition services referral as needed  Outcome: Progressing

## 2021-03-01 NOTE — ED NOTES
Assumed care for pt  Report received from [facility]  Pt without new complaints  Rambo Parkinson, MARIELLA  02/28/21 9582

## 2021-03-01 NOTE — ASSESSMENT & PLAN NOTE
Prior history of IBD, chronically maintained on Humira  Reports current symptoms are not typical of previous UC flares

## 2021-03-01 NOTE — ASSESSMENT & PLAN NOTE
Creatine levels at 1 42  Baseline appears to be between 1 1 and 1 2  Avoid nephrotoxic agents  IV fluids  Monitor Urinary output  Monitor Electrolytes  Check AM CMP  Nephrology consult if no improvement

## 2021-03-01 NOTE — CASE MANAGEMENT
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  Patient reports working FT  No Hx VNA, STR, MH, or SA identified  No POA identified, patient declined information from this CM  PCP: Elle Shrestha    Preferred Pharmacy: [facility]), no barriers identified to obtaining Rx from that location  CM reviewed discharge planning process including the following: identifying help at home, patient preference for discharge planning needs, pharmacy preference, and availability of treatment team to discuss questions or concerns patient and/or family may have regarding understanding medications and recognizing signs and symptoms once discharged  CM also encouraged patient to follow up with all recommended appointments after discharge  Patient advised of importance for patient and family to participate in managing patients medical well being  CM name and role reviewed  Discharge Checklist reviewed and CM will continue to monitor for progress toward discharge goals in nursing and provider rounds  Patient reports that his mother or GF will provide transportation home when cleared for discharge

## 2021-03-01 NOTE — TELEPHONE ENCOUNTER
Patient of Dr Joe Meek, last seen 7/13/20    History of UC on humira    Called and spoke with patient  He is currently inpatient at [facility] for appendicitis  He states he wanted Dr Ross Beaver opinion because he values his input the most  Per patient (and notes), his appendicitis is early and may be able to be treated without surgery, just antibiotics  Per surgery's note, they plan to treat conservatively given patient has UC and is on humira  Patient requests I reach out to Dr Joe Meek for his input  I let him know I can do so, but we do not want his treatment delayed because he is waiting for an answer, and he could speak with inpatient doctors about consulting GI if he is concerned   He verbalized understanding but again requested I reach out to Dr Joe Meek for his thoughts

## 2021-03-01 NOTE — UTILIZATION REVIEW
Initial Clinical Review    Admission: Date/Time/Statement:   Admission Orders (From admission, onward)     Ordered        02/28/21 2343  Place in Observation  Once                   Orders Placed This Encounter   Procedures    Place in Observation     Standing Status:   Standing     Number of Occurrences:   1     Order Specific Question:   Level of Care     Answer:   Med Surg [16]     ED Arrival Information     Expected Arrival Acuity Means of Arrival Escorted By Service Admission Type    - 2/28/2021 19:36 Urgent Walk-In Friend General Medicine Urgent    Arrival Complaint    abd pain        Chief Complaint   Patient presents with    Abdominal Pain     RLQ intermittent pain; patient states his abdomen feels swollen; patient states he feels like he may be constipated; denies nausea, vomiting      Assessment/Plan:  37 y/o male with PMHx of ulcerative colitis who presents to ED as a walk in with c/o abdominal pain  X 1 day getting progressively worse  Pain a/w nausea and feels constipated  In ED K 3 4, Cr 1 41   CT a/p suspicious for early appendicitis  Admit observation to M/S unit -- Npo, IVF's, IV abx, analgesics prn  Zofran prn  Surgery consulted  Avoid nephrotoxics  Recheck BMP in AM  Continue previous home po meds  Surgery consult 3/1 -- currently pain is much improved, still pain with movement  Recommend conservative management  Continue npo, IVF's  Zosyn  Replete hypokalemia  Prn analgesics/antiemetics  SCD's   Hep sq      ED Triage Vitals   Temperature Pulse Respirations Blood Pressure SpO2   02/28/21 1940 02/28/21 1940 02/28/21 1940 02/28/21 1942 02/28/21 1940   97 7 °F (36 5 °C) 90 16 142/82 98 %      Temp Source Heart Rate Source Patient Position - Orthostatic VS BP Location FiO2 (%)   02/28/21 1940 02/28/21 1940 02/28/21 1940 02/28/21 1940 --   Temporal Monitor Lying Right arm       Pain Score       02/28/21 1940       4          Wt Readings from Last 1 Encounters:   03/01/21 96 2 kg (212 lb 1 3 oz) Additional Vital Signs:   Date/Time  Temp  Pulse  Resp  BP  MAP (mmHg)  SpO2  O2 Device  Patient Position - Orthostatic VS   03/01/21 07:01:31  97 6 °F (36 4 °C)  50Abnormal   16  125/76  92  95 %  None (Room air)  Lying   03/01/21 0656  --  50Abnormal   --  --  --  95 %  --  --   03/01/21 0042  --  --  --  --  --  --  None (Room air)  --   03/01/21 00:23:38  98 3 °F (36 8 °C)  80  16  131/77  95  97 %  None (Room air)  Sitting   02/28/21 2330  --  79  20  124/77  95  96 %  --  --   02/28/21 2300  --  85  19  123/71  90  96 %  --  --   02/28/21 2230  --  73  18  134/69  93  98 %  None (Room air)  Sitting   02/28/21 2000  --  82  20  126/69  91  96 %  None (Room air)  Lying   02/28/21 1945  --  --  --  --  --  --  None (Room air)  --   02/28/21 1942  --  --  --  142/82  --  --  --  --   02/28/21 1940  97 7 °F (36 5 °C)  90  16  --  --  98 %  None (Room air)  Lying       Pertinent Labs/Diagnostic Test Results:   CT a/p 2/18 -- Dilated appendix with minimal surrounding inflammatory changes   Findings are suspicious for early acute appendicitis         Results from last 7 days   Lab Units 03/01/21 0437 02/28/21 2037   WBC Thousand/uL 7 38 10 07   HEMOGLOBIN g/dL 15 4 16 3   HEMATOCRIT % 45 6 48 6   PLATELETS Thousands/uL 243 264   NEUTROS ABS Thousands/µL  --  6 67       Results from last 7 days   Lab Units 03/01/21 0437 02/28/21 2037   SODIUM mmol/L 139 140   POTASSIUM mmol/L 3 4* 3 4*   CHLORIDE mmol/L 104 101   CO2 mmol/L 26 33*   ANION GAP mmol/L 9 6   BUN mg/dL 17 18   CREATININE mg/dL 1 20 1 42*   EGFR ml/min/1 73sq m 81 66   CALCIUM mg/dL 8 6 8 9   MAGNESIUM mg/dL 2 3  --    PHOSPHORUS mg/dL 4 5  --      Results from last 7 days   Lab Units 03/01/21  0437 02/28/21 2037   AST U/L 12 18   ALT U/L 23 24   ALK PHOS U/L 92 103   TOTAL PROTEIN g/dL 7 6 8 4*   ALBUMIN g/dL 3 8 4 4   TOTAL BILIRUBIN mg/dL 0 50 0 60     Results from last 7 days   Lab Units 03/01/21 0437 02/28/21 2037   GLUCOSE RANDOM mg/dL 95 78 Results from last 7 days   Lab Units 02/28/21 2037   LIPASE u/L 97     ED Treatment:   Medication Administration from 02/28/2021 1934 to 03/01/2021 0013       Date/Time Order Dose Route Action     02/28/2021 2120 iohexol (OMNIPAQUE) 350 MG/ML injection (MULTI-DOSE) 100 mL 100 mL Intravenous Given     Past Medical History:   Diagnosis Date    Rhabdomyolysis 2011     Present on Admission:   Ulcerative colitis ([facility] )      Admitting Diagnosis: Abdominal pain [R10 9]  RLQ abdominal pain [R10 31]  Acute appendicitis, unspecified acute appendicitis type [K35 80]  Age/Sex: 36 y o  male  Admission Orders:  Scheduled Medications:  piperacillin-tazobactam, 3 375 g, Intravenous, Q6H  potassium chloride, 20 mEq, Intravenous, Q2H  potassium chloride, 20 mEq, Intravenous, Once    Continuous IV Infusions:  sodium chloride, 125 mL/hr, Intravenous, Continuous    PRN Meds:  acetaminophen, 650 mg, Oral, Q6H PRN  ondansetron, 4 mg, Intravenous, Q6H PRN        Network Utilization Review Department  ATTENTION: Please call with any questions or concerns to [phone] and carefully listen to the prompts so that you are directed to the right person  All voicemails are confidential   Jeri Dumont all requests for admission clinical reviews, approved or denied determinations and any other requests to dedicated fax number below belonging to the campus where the patient is receiving treatment   List of dedicated fax numbers for the Facilities:  [address] DENIALS (Administrative/Medical Necessity) [phone]   [address] [place] [address] (Maternity/NICU/Pediatrics) 261 [facility] [address] Dakota Bui 1277 (Jeffrey Jimenez "Mirian" 103) [facility] [address] [place] [address] Kenisha Osborn 1481 [phone]   [place] [address]27831 [phone]

## 2021-03-02 ENCOUNTER — TELEPHONE (OUTPATIENT)
Dept: GASTROENTEROLOGY | Facility: MEDICAL CENTER | Age: 31
End: 2021-03-02

## 2021-03-02 VITALS
RESPIRATION RATE: 16 BRPM | BODY MASS INDEX: 27.47 KG/M2 | TEMPERATURE: 97.3 F | DIASTOLIC BLOOD PRESSURE: 68 MMHG | SYSTOLIC BLOOD PRESSURE: 123 MMHG | HEIGHT: 73 IN | OXYGEN SATURATION: 99 % | WEIGHT: 207.23 LBS | HEART RATE: 67 BPM

## 2021-03-02 LAB
ANION GAP SERPL CALCULATED.3IONS-SCNC: 8 MMOL/L (ref 4–13)
BASOPHILS # BLD AUTO: 0.06 THOUSANDS/ΜL (ref 0–0.1)
BASOPHILS NFR BLD AUTO: 1 % (ref 0–1)
BUN SERPL-MCNC: 8 MG/DL (ref 5–25)
CALCIUM SERPL-MCNC: 8.6 MG/DL (ref 8.3–10.1)
CHLORIDE SERPL-SCNC: 106 MMOL/L (ref 100–108)
CO2 SERPL-SCNC: 27 MMOL/L (ref 21–32)
CREAT SERPL-MCNC: 1.29 MG/DL (ref 0.6–1.3)
EOSINOPHIL # BLD AUTO: 0.29 THOUSAND/ΜL (ref 0–0.61)
EOSINOPHIL NFR BLD AUTO: 4 % (ref 0–6)
ERYTHROCYTE [DISTWIDTH] IN BLOOD BY AUTOMATED COUNT: 12.7 % (ref 11.6–15.1)
GFR SERPL CREATININE-BSD FRML MDRD: 74 ML/MIN/1.73SQ M
GLUCOSE SERPL-MCNC: 87 MG/DL (ref 65–140)
HCT VFR BLD AUTO: 43.2 % (ref 36.5–49.3)
HGB BLD-MCNC: 14.3 G/DL (ref 12–17)
IMM GRANULOCYTES # BLD AUTO: 0.02 THOUSAND/UL (ref 0–0.2)
IMM GRANULOCYTES NFR BLD AUTO: 0 % (ref 0–2)
LYMPHOCYTES # BLD AUTO: 2.87 THOUSANDS/ΜL (ref 0.6–4.47)
LYMPHOCYTES NFR BLD AUTO: 40 % (ref 14–44)
MAGNESIUM SERPL-MCNC: 1.9 MG/DL (ref 1.6–2.6)
MCH RBC QN AUTO: 29.5 PG (ref 26.8–34.3)
MCHC RBC AUTO-ENTMCNC: 33.1 G/DL (ref 31.4–37.4)
MCV RBC AUTO: 89 FL (ref 82–98)
MONOCYTES # BLD AUTO: 0.54 THOUSAND/ΜL (ref 0.17–1.22)
MONOCYTES NFR BLD AUTO: 8 % (ref 4–12)
NEUTROPHILS # BLD AUTO: 3.32 THOUSANDS/ΜL (ref 1.85–7.62)
NEUTS SEG NFR BLD AUTO: 47 % (ref 43–75)
NRBC BLD AUTO-RTO: 0 /100 WBCS
PLATELET # BLD AUTO: 233 THOUSANDS/UL (ref 149–390)
PMV BLD AUTO: 10.8 FL (ref 8.9–12.7)
POTASSIUM SERPL-SCNC: 3.8 MMOL/L (ref 3.5–5.3)
RBC # BLD AUTO: 4.84 MILLION/UL (ref 3.88–5.62)
SODIUM SERPL-SCNC: 141 MMOL/L (ref 136–145)
WBC # BLD AUTO: 7.1 THOUSAND/UL (ref 4.31–10.16)

## 2021-03-02 PROCEDURE — 99217 PR OBSERVATION CARE DISCHARGE MANAGEMENT: CPT | Performed by: INTERNAL MEDICINE

## 2021-03-02 PROCEDURE — 83735 ASSAY OF MAGNESIUM: CPT | Performed by: INTERNAL MEDICINE

## 2021-03-02 PROCEDURE — 85025 COMPLETE CBC W/AUTO DIFF WBC: CPT | Performed by: INTERNAL MEDICINE

## 2021-03-02 PROCEDURE — 99214 OFFICE O/P EST MOD 30 MIN: CPT | Performed by: SURGERY

## 2021-03-02 PROCEDURE — NC001 PR NO CHARGE: Performed by: INTERNAL MEDICINE

## 2021-03-02 PROCEDURE — 80048 BASIC METABOLIC PNL TOTAL CA: CPT | Performed by: INTERNAL MEDICINE

## 2021-03-02 RX ORDER — AMOXICILLIN AND CLAVULANATE POTASSIUM 875; 125 MG/1; MG/1
1 TABLET, FILM COATED ORAL EVERY 12 HOURS SCHEDULED
Qty: 28 TABLET | Refills: 0 | Status: SHIPPED | OUTPATIENT
Start: 2021-03-02 | End: 2021-03-16

## 2021-03-02 RX ORDER — POTASSIUM CHLORIDE 20 MEQ/1
20 TABLET, EXTENDED RELEASE ORAL ONCE
Status: COMPLETED | OUTPATIENT
Start: 2021-03-02 | End: 2021-03-02

## 2021-03-02 RX ORDER — ACETAMINOPHEN 325 MG/1
650 TABLET ORAL EVERY 6 HOURS PRN
Refills: 0
Start: 2021-03-02 | End: 2021-06-23 | Stop reason: HOSPADM

## 2021-03-02 RX ADMIN — PIPERACILLIN AND TAZOBACTAM 3.38 G: 3; .375 INJECTION, POWDER, FOR SOLUTION INTRAVENOUS at 06:00

## 2021-03-02 RX ADMIN — MAGNESIUM OXIDE TAB 400 MG (241.3 MG ELEMENTAL MG) 400 MG: 400 (241.3 MG) TAB at 08:06

## 2021-03-02 RX ADMIN — PIPERACILLIN AND TAZOBACTAM 3.38 G: 3; .375 INJECTION, POWDER, FOR SOLUTION INTRAVENOUS at 12:17

## 2021-03-02 RX ADMIN — SODIUM CHLORIDE 100 ML/HR: 0.9 INJECTION, SOLUTION INTRAVENOUS at 04:03

## 2021-03-02 RX ADMIN — POTASSIUM CHLORIDE 20 MEQ: 1500 TABLET, EXTENDED RELEASE ORAL at 08:06

## 2021-03-02 RX ADMIN — PIPERACILLIN AND TAZOBACTAM 3.38 G: 3; .375 INJECTION, POWDER, FOR SOLUTION INTRAVENOUS at 00:00

## 2021-03-02 NOTE — CONSULTS
Consultation - [facility] ([facility]) [facility]  Lynn Chamberlain 36 y o  male MRN: [id number]  Unit/Bed#: 173-39 Encounter: [id number]        ASSESSMENT/PLAN:   Lynn Chamberlain is a 36 y o  male with UC (dx 2019) obn Humira and doing well who p/w RLQ abdominal pain and imaging c/f Acute appendicitis  Notably, his symptoms are mild and no fevers  WBC is 7  Normal Hgb, albumin, CMP  Lipase normal  Imaging reviewed with dilated appendix but minimal surrounding inflammatory changes  No colitis otherwise on imaging  I suspect his UC is under good control with the humira in clinical remission  Appreciate surgical assessment with plan for conservative management  Continue Humira as an outpt once antibiotics complete  Outpatient colonoscopy in 6+ weeks  Check Humira level as an outpt   Close outpatient follow-up  DVT ppx      Inpatient consult to gastroenterology  Consult performed by: Benito Mesa MD  Consult ordered by: Charlotte Ornelas DO          Reason for Consult / Principal Problem: Acute appendicitis    HPI: Lynn Chamberlain is a 36y o  year old male with UC (dx 2019) obn Humira and doing well who p/w RLQ abdominal pain and imaging c/f Acute appendicitis  The patient reports he has been doing well in terms of his UC, on Humira, with relatively formed stools and infrequent abdominal pain  Recently he has been constipated  He has occasional LLQ abdominal cramps  Prior to presentation he started having RLQ abdominal pain which is different  He presented and imaging notable for mild acute pancreatitis  No nausea, vomiting, BRBPR, black stools  He was started on antibiotics with loose stools  No fevers, chills  Review of Systems: as per HPI  10 point ROS reviewed and negative, except as above    Historical Information   Past Medical History:   Diagnosis Date    Rhabdomyolysis 2011     History reviewed  No pertinent surgical history    Social History   Social History     Substance and Sexual Activity Alcohol Use Not Currently    Frequency: Monthly or less    Drinks per session: 1 or 2     Social History     Substance and Sexual Activity   Drug Use Never     Social History     Tobacco Use   Smoking Status Never Smoker   Smokeless Tobacco Never Used     Family History   Problem Relation Age of Onset    No Known Problems Mother     No Known Problems Father     Diabetes Paternal Grandfather        Meds/Allergies     Medications Prior to Admission   Medication    Humira Pen 40 MG/0 4ML PNKT    mesalamine (ROWASA) 4 g    omeprazole (PriLOSEC) 40 MG capsule     Current Facility-Administered Medications   Medication Dose Route Frequency    acetaminophen (TYLENOL) tablet 650 mg  650 mg Oral Q6H PRN    enoxaparin (LOVENOX) subcutaneous injection 40 mg  40 mg Subcutaneous Q24H ROMA    ondansetron (ZOFRAN) injection 4 mg  4 mg Intravenous Q6H PRN    pantoprazole (PROTONIX) injection 40 mg  40 mg Intravenous Q24H ROMA    piperacillin-tazobactam (ZOSYN) 3 375 g in sodium chloride 0 9 % 100 mL IVPB  3 375 g Intravenous Q6H    sodium chloride 0 9 % infusion  50 mL/hr Intravenous Continuous       Allergies   Allergen Reactions    Dilaudid [Hydromorphone]      Chest pain/pressure       Objective     Blood pressure 123/68, pulse 67, temperature (!) 97 3 °F (36 3 °C), temperature source Oral, resp  rate 16, height 6' 1" (1 854 m), weight 94 kg (207 lb 3 7 oz), SpO2 99 %  Intake/Output Summary (Last 24 hours) at 3/2/2021 1328  Last data filed at 3/2/2021 1300  Gross per 24 hour   Intake 2160 ml   Output --   Net 2160 ml       PHYSICAL EXAMINATION:    General Appearance:   Alert, cooperative, no distress   HEENT:  Normocephalic, atraumatic, anicteric  Neck supple, symmetrical, trachea midline  Lungs:   Equal chest rise and unlabored breathing, normal effort, no coughing  Cardiovascular:   No visualized JVD  Abdomen:   No abdominal distension  Skin:   No jaundice, rashes, or lesions      Musculoskeletal: Normal range of motion visualized  Psych:  Normal affect and normal insight  Neuro:  Alert and appropriate         Lab Results:   CBC:   Lab Results   Component Value Date    WBC 7 10 03/02/2021    HGB 14 3 03/02/2021    HCT 43 2 03/02/2021    MCV 89 03/02/2021     03/02/2021    MCH 29 5 03/02/2021    MCHC 33 1 03/02/2021    RDW 12 7 03/02/2021    MPV 10 8 03/02/2021    NRBC 0 03/02/2021   ,   CMP:   Lab Results   Component Value Date    K 3 8 03/02/2021     03/02/2021    CO2 27 03/02/2021    BUN 8 03/02/2021    CREATININE 1 29 03/02/2021    CALCIUM 8 6 03/02/2021    EGFR 74 03/02/2021   ,   Lipase: No results found for: LIPASE,  PT/INR: No results found for: PT, INR,   Troponin: No results found for: TROPONINI,   Magnesium: No components found for: MAG,   Phosphorous: No results found for: PHOS  Imaging Studies: I have personally reviewed pertinent films in PACS

## 2021-03-02 NOTE — CASE MANAGEMENT
Discussed with patient preferences on discharge;understanding how to manage health at home; purpose of taking medications; importance of follow up care/appointments; and symptoms to watch out for once discharged home  Pt is being dc'd home on this date and will be following up with GI and PCP as an outpatient  [facility] re: an appt

## 2021-03-02 NOTE — PLAN OF CARE
Problem: PAIN - ADULT  Goal: Verbalizes/displays adequate comfort level or baseline comfort level  Description: Interventions:  - Encourage patient to monitor pain and request assistance  - Assess pain using appropriate pain scale (0-10 pain scale)  - Administer analgesics based on type and severity of pain and evaluate response  - Implement non-pharmacological measures as appropriate and evaluate response  - Consider cultural and social influences on pain and pain management  - Notify physician/advanced practitioner if interventions unsuccessful or patient reports new pain  Outcome: Progressing     Problem: INFECTION - ADULT  Goal: Absence or prevention of progression during hospitalization  Description: INTERVENTIONS:  - Assess and monitor for signs and symptoms of infection  - Monitor lab/diagnostic results  - Monitor all insertion sites, i e  indwelling lines  - Administer medications as ordered  - Instruct and encourage patient and family to use good hand hygiene technique  Outcome: Progressing     Problem: SAFETY ADULT  Goal: Patient will remain free of falls  Description: INTERVENTIONS:  - Assess patient frequently for physical needs  -  Identify cognitive and physical deficits and behaviors that affect risk of falls    -  [place] fall precautions as indicated by assessment  (moderate fall risk)  - Educate patient/family on patient safety including physical limitations  - Instruct patient to call for assistance with activity based on assessment  - Modify environment to reduce risk of injury  - Consider OT/PT consult to assist with strengthening/mobility  Outcome: Progressing  Goal: Maintain or return to baseline ADL function  Description: INTERVENTIONS:  -  Assess patient's ability to carry out ADLs; (independent)   - Assess range of motion  - Assess patient's mobility; (independent)  - Encourage maximum independence but intervene and supervise when necessary  - Assess for home care needs following discharge   - Consider OT consult to assist with ADL evaluation and planning for discharge  - Provide patient education as appropriate  Outcome: Progressing  Goal: Maintain or return mobility status to optimal level  Description: INTERVENTIONS:  - Assess patient's baseline mobility status (independent)    - Identify cognitive and physical deficits and behaviors that affect mobility  - [place] fall precautions as indicated by assessment (moderate fall risk)  - Record patient progress and toleration of activity level on Mobility SBAR; progress patient to next Phase/Stage  - Instruct patient to call for assistance with activity based on assessment  - Consider rehabilitation consult to assist with strengthening/weightbearing, etc   Outcome: Progressing     Problem: DISCHARGE PLANNING  Goal: Discharge to home or other facility with appropriate resources  Description: INTERVENTIONS:  - Identify barriers to discharge w/patient and caregiver  - Arrange for needed discharge resources and transportation as appropriate  - Identify discharge learning needs (meds, wound care, etc )  - Refer to Case Management Department for coordinating discharge planning if the patient needs post-hospital services based on physician/advanced practitioner order or complex needs related to functional status, cognitive ability, or social support system  Outcome: Progressing     Problem: Knowledge Deficit  Goal: Patient/family/caregiver demonstrates understanding of disease process, treatment plan, medications, and discharge instructions  Description: Complete learning assessment and assess knowledge base    Interventions:  - Provide teaching at level of understanding  - Provide teaching via preferred learning methods  Outcome: Progressing     Problem: GASTROINTESTINAL - ADULT  Goal: Minimal or absence of nausea and/or vomiting  Description: INTERVENTIONS:  - Administer IV fluids if ordered to ensure adequate hydration  - Maintain NPO status until nausea and vomiting are resolved  - Administer ordered antiemetic medications as needed  - Provide nonpharmacologic comfort measures as appropriate  - Advance diet as tolerated,(clear liquid diet)  - Consider nutrition services referral to assist patient with adequate nutrition and appropriate food choices  Outcome: Progressing  Goal: Maintains or returns to baseline bowel function  Description: INTERVENTIONS:  - Assess bowel function  - Encourage oral fluids to ensure adequate hydration  - Administer IV fluids if ordered to ensure adequate hydration  - Administer ordered medications as needed  - Encourage mobilization and activity  - Consider nutritional services referral to assist patient with adequate nutrition and appropriate food choices  Outcome: Progressing  Goal: Maintains adequate nutritional intake  Description: INTERVENTIONS:  - Monitor percentage of each meal consumed  - Identify factors contributing to decreased intake, treat as appropriate  - Assist with meals as needed  - Monitor I&O, weight, and lab values if indicated  - Obtain nutrition services referral as needed  Outcome: Progressing

## 2021-03-02 NOTE — ASSESSMENT & PLAN NOTE
History and imaging findings suspicious for acute appendicitis - CT with a dilated appendix and minimal surrounding inflammatory changes  Patient is on Humira chronically, and at risk for infection with increased severity  Was maintained on broad-spectrum antibiotic coverage with Pip-Alverto  As pain and symptoms have considerably improved, and patient is tolerating diet, plan by surgeries for discharge with completion of a 2 week course with oral Augmentin  Will insure follow-up with PCP in GI as outpatient  Recommendations by surgery are for repeat colonoscopy in 6-8 weeks

## 2021-03-02 NOTE — ASSESSMENT & PLAN NOTE
AK in setting of decreased p o  Intake and acute illness, likely represents prerenal etiology  Inital creatinine of 1 42, improved and stable at 1 2 this morning  OMA appears resolved

## 2021-03-02 NOTE — PROGRESS NOTES
Progress Note - General Surgery   Ailyn Stern 36 y o  male MRN: [id number]  Unit/Bed#: 467-46 Encounter: [id number]    Assessment:  36 y o  M w/ hx of pan colitis on Humira (last dose 2/28) found to have early signs of acute appendicitis  -denies RLQ pain, states only tender with palpation, tolerated clears eager to eat, denies N/V  Denies fever/chills  -afebrile, VSS  -remains without leukocytosis, hypokalemia resolved, other labs WNL  -exam: soft, non-distended, tenderness to deep palpation in RLQ  No guarding, rebound, or rigidity  Plan:  -Advance diet, if tolerated okay for discharge from surgical standpoint with 2 weeks of Augmentin  -Reduce IVF  -Zosyn  -PRN pain meds  -DVT ppx  -Rest of care per primary  -Recommend repeat cscope in approximately 6-8 weeks after he recovers from appendicitis    Subjective: Patient doing well with no overnight issues  States he has no abdominal pain, tolerated clears without N/V and eager to eat  Ambulating  Denies fever, chills, CP, SOB, or calf tenderness  Objective:     Blood pressure 123/68, pulse 67, temperature (!) 97 3 °F (36 3 °C), temperature source Oral, resp  rate 16, height 6' 1" (1 854 m), weight 94 kg (207 lb 3 7 oz), SpO2 99 %  ,Body mass index is 27 34 kg/m²  Intake/Output Summary (Last 24 hours) at 3/2/2021 1134  Last data filed at 3/2/2021 4516  Gross per 24 hour   Intake 1880 ml   Output --   Net 1880 ml       Invasive Devices     Peripheral Intravenous Line            Peripheral IV 02/28/21 Right Antecubital 1 day                Physical Exam  Constitutional:       General: He is not in acute distress  Appearance: Normal appearance  He is not ill-appearing, toxic-appearing or diaphoretic  Cardiovascular:      Rate and Rhythm: Normal rate and regular rhythm  Pulmonary:      Effort: Pulmonary effort is normal       Breath sounds: Normal breath sounds  No wheezing, rhonchi or rales  Musculoskeletal:         General: No tenderness  Right lower leg: No edema  Left lower leg: No edema  Neurological:      Mental Status: He is alert  Psychiatric:         Mood and Affect: Mood normal         Abdomen: soft, non-distended, tenderness to deep palpation in RLQ  No guarding, rebound, or rigidity  Extremities: no calf tenderness or pedal edema    Scheduled Meds:  Current Facility-Administered Medications   Medication Dose Route Frequency Provider Last Rate    acetaminophen  650 mg Oral Q6H PRN Mateus Thurston PA-C      enoxaparin  40 mg Subcutaneous Q24H [address] Griffin Wallis MD      ondansetron  4 mg Intravenous Q6H PRN Mateus Thurston PA-C      pantoprazole  40 mg Intravenous Q24H [address] Griffin Wallis MD      piperacillin-tazobactam  3 375 g Intravenous Q6H Mateus Thurston PA-C 3 375 g (03/02/21 0600)    sodium chloride  100 mL/hr Intravenous Continuous Griffin Wallis  mL/hr (03/02/21 0403)     Continuous Infusions:sodium chloride, 100 mL/hr, Last Rate: 100 mL/hr (03/02/21 0403)      PRN Meds:   acetaminophen    ondansetron      Lab, Imaging and other studies:I have personally reviewed pertinent lab results      VTE Pharmacologic Prophylaxis: Enoxaparin (Lovenox)  VTE Mechanical Prophylaxis: sequential compression device      Lory Mchugh PA-C  3/2/2021 11:34 AM

## 2021-03-02 NOTE — TELEPHONE ENCOUNTER
Hi,    Can we set him up for an appointment with me in 2 weeks (in person) and a colonoscopy with me in 6-10 weeks?     Thank you

## 2021-03-02 NOTE — INCIDENTAL FINDINGS
The following findings require follow up:  Radiographic finding   Finding: Acute Appendicitis   Follow up required: Repeat Colonoscopy, Follow-up clinically and symptomatically   Follow up should be done within 1-2 week(s)

## 2021-03-02 NOTE — UTILIZATION REVIEW
Continued Stay Review    Date: 3/2/21                         Current Patient Class:  Observation  Current Level of Care: Med Surg    HPI:31 y o  male with PMH of ulcerative colitis on Humira, initially admitted as Observation on 2/28/21 for evaluation and treatment of acute appendicitis  Patient presented to the ED with abdominal ain associated with nausea and constipaton  Placed on IV antibiotics, IV fluids  General Surgery on consult, recommended conservative management with IVF, IV antibiotics, anti-emetics  3/2/21  Discharge Summary: Was maintained on broad-spectrum antibiotic coverage with Pip-Alverto  As pain and symptoms have considerably improved, and patient is tolerating diet, plan by surgeries for discharge with completion of a 2 week course with oral Augmentin  Will insure follow-up with PCP in GI as outpatient  Recommendations by surgery are for repeat colonoscopy in 6-8 weeks      Pertinent Labs/Diagnostic Results:       Results from last 7 days   Lab Units 03/02/21 0530 03/01/21 0437 02/28/21 2037   WBC Thousand/uL 7 10 7 38 10 07   HEMOGLOBIN g/dL 14 3 15 4 16 3   HEMATOCRIT % 43 2 45 6 48 6   PLATELETS Thousands/uL 233 243 264   NEUTROS ABS Thousands/µL 3 32  --  6 67         Results from last 7 days   Lab Units 03/02/21  0530 03/01/21 0437 02/28/21 2037   SODIUM mmol/L 141 139 140   POTASSIUM mmol/L 3 8 3 4* 3 4*   CHLORIDE mmol/L 106 104 101   CO2 mmol/L 27 26 33*   ANION GAP mmol/L 8 9 6   BUN mg/dL 8 17 18   CREATININE mg/dL 1 29 1 20 1 42*   EGFR ml/min/1 73sq m 74 81 66   CALCIUM mg/dL 8 6 8 6 8 9   MAGNESIUM mg/dL 1 9 2 3  --    PHOSPHORUS mg/dL  --  4 5  --      Results from last 7 days   Lab Units 03/01/21  0437 02/28/21 2037   AST U/L 12 18   ALT U/L 23 24   ALK PHOS U/L 92 103   TOTAL PROTEIN g/dL 7 6 8 4*   ALBUMIN g/dL 3 8 4 4   TOTAL BILIRUBIN mg/dL 0 50 0 60         Results from last 7 days   Lab Units 03/02/21  0530 03/01/21 0437 02/28/21 2037   GLUCOSE RANDOM mg/dL 87 95 78                           Results from last 7 days   Lab Units 02/28/21 2037   LIPASE u/L 97       Vital Signs:     Date/Time  Temp  Pulse  Resp  BP  MAP   SpO2  O2 Device    03/02/21 07:03:04  97 3 °F (36 3 °C)Abnormal   67  16  123/68  86  --  None (Room air)    03/01/21 22:56:45  97 5 °F (36 4 °C)  67  18  129/65  86  99 %  --          Medications:   Scheduled Medications:      enoxaparin, 40 mg, Subcutaneous, Q24H ROMA  pantoprazole, 40 mg, Intravenous, Q24H ROMA  piperacillin-tazobactam, 3 375 g, Intravenous, Q6H      Continuous IV Infusions:      sodium chloride, 100 mL/hr, Intravenous, Continuous      PRN Meds:  acetaminophen, 650 mg, Oral, Q6H PRN  ondansetron, 4 mg, Intravenous, Q6H PRN        Discharge Plan: D        Network Utilization Review Department  ATTENTION: Please call with any questions or concerns to [phone] and carefully listen to the prompts so that you are directed to the right person  All voicemails are confidential   Samira Zhang all requests for admission clinical reviews, approved or denied determinations and any other requests to dedicated fax number below belonging to the campus where the patient is receiving treatment   List of dedicated fax numbers for the Facilities:  [address] DENIALS (Administrative/Medical Necessity) [phone]   [address] (Maternity/NICU/Pediatrics) [phone] [address] Dakota Bui [address] (  Gerald Jimenez "[address]" [address]) [address] [place] [address] Kenisha Os[address] [facility]) [address] [place] [address] [phone]

## 2021-03-02 NOTE — DISCHARGE SUMMARY
Discharge- Carissa Harkins 1990, 36 y o  male MRN: [id number]    Unit/Bed#: 416-01 Encounter: [id number]    Primary Care Provider: Cas Mireles DO   Date and time admitted to hospital: 2/28/2021  7:37 PM        * Acute appendicitis  Assessment & Plan  History and imaging findings suspicious for acute appendicitis - CT with a dilated appendix and minimal surrounding inflammatory changes  Patient is on Humira chronically, and at risk for infection with increased severity  Was maintained on broad-spectrum antibiotic coverage with Pip-Alverto  As pain and symptoms have considerably improved, and patient is tolerating diet, plan by surgeries for discharge with completion of a 2 week course with oral Augmentin  Will insure follow-up with PCP in GI as outpatient  Recommendations by surgery are for repeat colonoscopy in 6-8 weeks  Ulcerative colitis ([facility] )  Assessment & Plan  Prior history of IBD, chronically maintained on Humira  Reports current symptoms are not typical of previous UC flares  OMA (acute kidney injury) ([facility] [address] )  Assessment & Plan  AK in setting of decreased p o  Intake and acute illness, likely represents prerenal etiology  Inital creatinine of 1 42, improved and stable at 1 2 this morning  OMA appears resolved  Hypokalemia  Assessment & Plan  Repleted and normalized  Discharging Physician / Practitioner: Fidencio Singh MD  PCP: Cas Mireles DO  Admission Date:   Admission Orders (From admission, onward)     Ordered        02/28/21 2343  Place in Observation  Once                   Discharge Date: 03/02/21    Resolved Problems  Date Reviewed: 3/2/2021    None          Consultations During Hospital Stay:  · Surgery    Procedures Performed:   · None    Significant Findings / Test Results:   Ct Abdomen Pelvis With Contrast    Result Date: 2/28/2021  Impression: Dilated appendix with minimal surrounding inflammatory changes    Findings are suspicious for early acute appendicitis  I personally discussed this study with Steven Burgosalexander on 2/28/2021 at 9:33 PM  Workstation performed: [id number]     Incidental Findings:   · As above     Test Results Pending at Discharge (will require follow up): · None     Outpatient Tests Requested:  · None    Complications:  None    Reason for Admission: Acute Appendicitis    HPI from original H&P:     Isabel Sousa is a 36 y o  male who presents to the ER with complaints of abdominal pain X 1 day getting progressively worst  Pain is associated with nausea he states that he also feels constipated  He denies chest pain, SOB, cough, fever, chills, vomiting, diarrhea urinary symptoms  Labs completed in the ER with results as shown below  CT abdomen and pelvis completed with results as shown below  ER Attending discussed case with Dr Ximena Bear  (general surgery)  At bedside patient is awake and alert, no acute distress  Patient is being admitted on observation status med surg level car for further management of Acute appendicitis, OMA, Hypokalemia  Please see above list of diagnoses and related plan for additional information on hospital course  Condition at Discharge: stable     Discharge Day Visit / Exam:   Vitals: Blood Pressure: 123/68 (03/02/21 0703)  Pulse: 67 (03/02/21 0703)  Temperature: (!) 97 3 °F (36 3 °C) (03/02/21 0703)  Temp Source: Oral (03/02/21 0703)  Respirations: 16 (03/02/21 0703)  Height: 6' 1" (185 4 cm) (03/01/21 0023)  Weight - Scale: 94 kg (207 lb 3 7 oz) (03/02/21 0550)  SpO2: 99 % (03/01/21 2256)  Exam:     Physical Exam  Constitutional:       General: He is not in acute distress  Appearance: Normal appearance  He is not ill-appearing  Cardiovascular:      Rate and Rhythm: Normal rate and regular rhythm  Heart sounds: No murmur  Pulmonary:      Effort: Pulmonary effort is normal       Breath sounds: Normal breath sounds  No wheezing, rhonchi or rales     Abdominal:      General: Bowel sounds are normal  There is no distension  Palpations: Abdomen is soft  Tenderness: There is abdominal tenderness  There is no guarding or rebound  Comments: Now minimal RLQ tenderness on deep palpation, without rebound tenderness  Musculoskeletal:         General: No swelling or tenderness  Neurological:      General: No focal deficit present  Mental Status: He is alert and oriented to person, place, and time  Psychiatric:         Mood and Affect: Mood normal          Behavior: Behavior normal          Thought Content: Thought content normal          Judgment: Judgment normal         Discharge instructions/Information to patient and family:   See after visit summary for information provided to patient and family  Provisions for Follow-Up Care:  See after visit summary for information related to follow-up care and any pertinent home health orders  Disposition:     Home    For Discharges to [facility] SNF:   · Not Applicable to this Patient - Not Applicable to this Patient    Planned Readmission: No     Discharge Statement:  I spent 20 minutes discharging the patient  This time was spent on the day of discharge  I had direct contact with the patient on the day of discharge  Greater than 50% of the total time was spent examining patient, answering all patient questions, arranging and discussing plan of care with patient as well as directly providing post-discharge instructions  Additional time then spent on discharge activities  Discharge Medications:  See after visit summary for reconciled discharge medications provided to patient and family        ** Please Note: This note has been constructed using a voice recognition system **

## 2021-03-02 NOTE — PLAN OF CARE
Problem: PAIN - ADULT  Goal: Verbalizes/displays adequate comfort level or baseline comfort level  Description: Interventions:  - Encourage patient to monitor pain and request assistance  - Assess pain using appropriate pain scale (0-10 pain scale)  - Administer analgesics based on type and severity of pain and evaluate response  - Implement non-pharmacological measures as appropriate and evaluate response  - Consider cultural and social influences on pain and pain management  - Notify physician/advanced practitioner if interventions unsuccessful or patient reports new pain  3/2/2021 1300 by Bogdan Friedman RN  Outcome: Adequate for Discharge  3/2/2021 0727 by Bogdan Friedman RN  Outcome: Progressing     Problem: INFECTION - ADULT  Goal: Absence or prevention of progression during hospitalization  Description: INTERVENTIONS:  - Assess and monitor for signs and symptoms of infection  - Monitor lab/diagnostic results  - Monitor all insertion sites, i e  indwelling lines  - Administer medications as ordered  - Instruct and encourage patient and family to use good hand hygiene technique  3/2/2021 1300 by Bogdan Friedman RN  Outcome: Adequate for Discharge  3/2/2021 3086 by Bogdan Friedman RN  Outcome: Progressing     Problem: SAFETY ADULT  Goal: Patient will remain free of falls  Description: INTERVENTIONS:  - Assess patient frequently for physical needs  -  Identify cognitive and physical deficits and behaviors that affect risk of falls    -  [place] fall precautions as indicated by assessment  (moderate fall risk)  - Educate patient/family on patient safety including physical limitations  - Instruct patient to call for assistance with activity based on assessment  - Modify environment to reduce risk of injury  - Consider OT/PT consult to assist with strengthening/mobility  3/2/2021 1300 by Bogdan Friedman RN  Outcome: Adequate for Discharge  3/2/2021 8109 by Bogdan Friedman RN  Outcome: Progressing  Goal: Maintain or return to baseline ADL function  Description: INTERVENTIONS:  -  Assess patient's ability to carry out ADLs; (independent)   - Assess range of motion  - Assess patient's mobility; (independent)  - Encourage maximum independence but intervene and supervise when necessary  - Assess for home care needs following discharge   - Consider OT consult to assist with ADL evaluation and planning for discharge  - Provide patient education as appropriate  3/2/2021 1300 by Polo Armas RN  Outcome: Adequate for Discharge  3/2/2021 0727 by Polo Armas RN  Outcome: Progressing  Goal: Maintain or return mobility status to optimal level  Description: INTERVENTIONS:  - Assess patient's baseline mobility status (independent)    - Identify cognitive and physical deficits and behaviors that affect mobility  - [place] fall precautions as indicated by assessment (moderate fall risk)  - Record patient progress and toleration of activity level on Mobility SBAR; progress patient to next Phase/Stage  - Instruct patient to call for assistance with activity based on assessment  - Consider rehabilitation consult to assist with strengthening/weightbearing, etc   3/2/2021 1300 by Polo Armas RN  Outcome: Adequate for Discharge  3/2/2021 0727 by Polo Armas RN  Outcome: Progressing     Problem: DISCHARGE PLANNING  Goal: Discharge to home or other facility with appropriate resources  Description: INTERVENTIONS:  - Identify barriers to discharge w/patient and caregiver  - Arrange for needed discharge resources and transportation as appropriate  - Identify discharge learning needs (meds, wound care, etc )  - Refer to Case Management Department for coordinating discharge planning if the patient needs post-hospital services based on physician/advanced practitioner order or complex needs related to functional status, cognitive ability, or social support system  3/2/2021 1300 by Polo Armas RN  Outcome: Adequate for Discharge  3/2/2021 5796 by Gracia Dempsey RN  Outcome: Progressing     Problem: Knowledge Deficit  Goal: Patient/family/caregiver demonstrates understanding of disease process, treatment plan, medications, and discharge instructions  Description: Complete learning assessment and assess knowledge base    Interventions:  - Provide teaching at level of understanding  - Provide teaching via preferred learning methods  3/2/2021 1300 by Gracia Dempsey RN  Outcome: Adequate for Discharge  3/2/2021 0727 by Gracia Dempsey RN  Outcome: Progressing     Problem: GASTROINTESTINAL - ADULT  Goal: Minimal or absence of nausea and/or vomiting  Description: INTERVENTIONS:  - Administer IV fluids if ordered to ensure adequate hydration  - Maintain NPO status until nausea and vomiting are resolved  - Administer ordered antiemetic medications as needed  - Provide nonpharmacologic comfort measures as appropriate  - Advance diet as tolerated,(clear liquid diet)  - Consider nutrition services referral to assist patient with adequate nutrition and appropriate food choices  3/2/2021 1300 by Gracia Dempsey RN  Outcome: Adequate for Discharge  3/2/2021 0924 by Gracia Dempsey RN  Outcome: Progressing  Goal: Maintains or returns to baseline bowel function  Description: INTERVENTIONS:  - Assess bowel function  - Encourage oral fluids to ensure adequate hydration  - Administer IV fluids if ordered to ensure adequate hydration  - Administer ordered medications as needed  - Encourage mobilization and activity  - Consider nutritional services referral to assist patient with adequate nutrition and appropriate food choices  3/2/2021 1300 by Gracia Dempsey RN  Outcome: Adequate for Discharge  3/2/2021 8171 by Gracia Dempsey RN  Outcome: Progressing  Goal: Maintains adequate nutritional intake  Description: INTERVENTIONS:  - Monitor percentage of each meal consumed  - Identify factors contributing to decreased intake, treat as appropriate  - Assist with meals as needed  - Monitor I&O, weight, and lab values if indicated  - Obtain nutrition services referral as needed  3/2/2021 1300 by Bogdan Friedman RN  Outcome: Adequate for Discharge  3/2/2021 6211 by Bogdan Friedman RN  Outcome: Progressing

## 2021-03-03 ENCOUNTER — TRANSITIONAL CARE MANAGEMENT (OUTPATIENT)
Dept: FAMILY MEDICINE CLINIC | Facility: CLINIC | Age: 31
End: 2021-03-03

## 2021-03-03 ENCOUNTER — PREP FOR PROCEDURE (OUTPATIENT)
Dept: PULMONOLOGY | Facility: CLINIC | Age: 31
End: 2021-03-03

## 2021-03-03 DIAGNOSIS — K51.90 ULCERATIVE COLITIS WITHOUT COMPLICATIONS, UNSPECIFIED LOCATION (HCC): Primary | ICD-10-CM

## 2021-03-03 NOTE — TELEPHONE ENCOUNTER
Pt eladio for 3/17 with dr Fatoumata Hedrick and 5/26 for colonoscopy I went over prep and mailed to pt miralx/ducolax prep

## 2021-03-05 ENCOUNTER — TELEPHONE (OUTPATIENT)
Dept: GASTROENTEROLOGY | Facility: CLINIC | Age: 31
End: 2021-03-05

## 2021-03-05 NOTE — LETTER
March 5, 2021    [address]  [facility]  [address] [facility] [address] [facility]      To Whom It May Concern:    Vania Rivas has been under my care for treatment of his current medical condition  He may return to work on 3/15/21  If you have any questions or concerns, please don't hesitate to call [phone]      Sincerely,           MARIELLA Koehler Dr

## 2021-03-05 NOTE — TELEPHONE ENCOUNTER
Patients GI provider:  Dr Izabella Morgan    Number to return call: (    Reason for call: Pt calling stating he needs a return to work note extended  Pt stated he was supposed to return to on Monday 03/08/21 and would like to extend it to 03/15/21  Pt states he continues to have diarrhea and is not feeling well       Scheduled procedure/appointment date if applicable: Appt - 20/06/43

## 2021-03-05 NOTE — TELEPHONE ENCOUNTER
Hx ulcerative pancolitis, gerd    Patient recently inpatient for appendicitis  He is reuquesting a note to extend his return to work until 3/15/21  He has 7 liquid BMs daily/ no blood, fatigue, increased thirst and does not feel ready to return to work  Denies n/v, pain or fever  Increasing po fluids for hydration  Ok to provide note?

## 2021-03-15 ENCOUNTER — LAB (OUTPATIENT)
Dept: LAB | Facility: MEDICAL CENTER | Age: 31
End: 2021-03-15
Payer: COMMERCIAL

## 2021-03-15 ENCOUNTER — OFFICE VISIT (OUTPATIENT)
Dept: FAMILY MEDICINE CLINIC | Facility: CLINIC | Age: 31
End: 2021-03-15
Payer: COMMERCIAL

## 2021-03-15 VITALS
DIASTOLIC BLOOD PRESSURE: 76 MMHG | HEIGHT: 73 IN | TEMPERATURE: 97.5 F | WEIGHT: 215.2 LBS | BODY MASS INDEX: 28.52 KG/M2 | SYSTOLIC BLOOD PRESSURE: 128 MMHG

## 2021-03-15 DIAGNOSIS — K51.90 ULCERATIVE COLITIS WITHOUT COMPLICATIONS, UNSPECIFIED LOCATION (HCC): ICD-10-CM

## 2021-03-15 DIAGNOSIS — K35.80 ACUTE APPENDICITIS, UNSPECIFIED ACUTE APPENDICITIS TYPE: Primary | ICD-10-CM

## 2021-03-15 LAB
ANION GAP SERPL CALCULATED.3IONS-SCNC: 4 MMOL/L (ref 4–13)
BASOPHILS # BLD AUTO: 0.08 THOUSANDS/ΜL (ref 0–0.1)
BASOPHILS NFR BLD AUTO: 1 % (ref 0–1)
BUN SERPL-MCNC: 14 MG/DL (ref 5–25)
CALCIUM SERPL-MCNC: 9.2 MG/DL (ref 8.3–10.1)
CHLORIDE SERPL-SCNC: 107 MMOL/L (ref 100–108)
CO2 SERPL-SCNC: 30 MMOL/L (ref 21–32)
CREAT SERPL-MCNC: 1.37 MG/DL (ref 0.6–1.3)
EOSINOPHIL # BLD AUTO: 0.6 THOUSAND/ΜL (ref 0–0.61)
EOSINOPHIL NFR BLD AUTO: 10 % (ref 0–6)
ERYTHROCYTE [DISTWIDTH] IN BLOOD BY AUTOMATED COUNT: 12.8 % (ref 11.6–15.1)
GFR SERPL CREATININE-BSD FRML MDRD: 69 ML/MIN/1.73SQ M
GLUCOSE P FAST SERPL-MCNC: 86 MG/DL (ref 65–99)
HCT VFR BLD AUTO: 50 % (ref 36.5–49.3)
HGB BLD-MCNC: 16.5 G/DL (ref 12–17)
IMM GRANULOCYTES # BLD AUTO: 0.01 THOUSAND/UL (ref 0–0.2)
IMM GRANULOCYTES NFR BLD AUTO: 0 % (ref 0–2)
LYMPHOCYTES # BLD AUTO: 2.39 THOUSANDS/ΜL (ref 0.6–4.47)
LYMPHOCYTES NFR BLD AUTO: 39 % (ref 14–44)
MCH RBC QN AUTO: 29.4 PG (ref 26.8–34.3)
MCHC RBC AUTO-ENTMCNC: 33 G/DL (ref 31.4–37.4)
MCV RBC AUTO: 89 FL (ref 82–98)
MONOCYTES # BLD AUTO: 0.45 THOUSAND/ΜL (ref 0.17–1.22)
MONOCYTES NFR BLD AUTO: 7 % (ref 4–12)
NEUTROPHILS # BLD AUTO: 2.55 THOUSANDS/ΜL (ref 1.85–7.62)
NEUTS SEG NFR BLD AUTO: 43 % (ref 43–75)
NRBC BLD AUTO-RTO: 0 /100 WBCS
PLATELET # BLD AUTO: 249 THOUSANDS/UL (ref 149–390)
PMV BLD AUTO: 11.3 FL (ref 8.9–12.7)
POTASSIUM SERPL-SCNC: 4.1 MMOL/L (ref 3.5–5.3)
RBC # BLD AUTO: 5.62 MILLION/UL (ref 3.88–5.62)
SODIUM SERPL-SCNC: 141 MMOL/L (ref 136–145)
WBC # BLD AUTO: 6.08 THOUSAND/UL (ref 4.31–10.16)

## 2021-03-15 PROCEDURE — 99495 TRANSJ CARE MGMT MOD F2F 14D: CPT | Performed by: FAMILY MEDICINE

## 2021-03-15 PROCEDURE — 80048 BASIC METABOLIC PNL TOTAL CA: CPT | Performed by: FAMILY MEDICINE

## 2021-03-15 PROCEDURE — 36415 COLL VENOUS BLD VENIPUNCTURE: CPT | Performed by: FAMILY MEDICINE

## 2021-03-15 PROCEDURE — 86140 C-REACTIVE PROTEIN: CPT | Performed by: FAMILY MEDICINE

## 2021-03-15 PROCEDURE — 1111F DSCHRG MED/CURRENT MED MERGE: CPT | Performed by: FAMILY MEDICINE

## 2021-03-15 PROCEDURE — 85025 COMPLETE CBC W/AUTO DIFF WBC: CPT | Performed by: FAMILY MEDICINE

## 2021-03-15 NOTE — LETTER
March 15, 2021     Patient: Waqar Vela   YOB: 1990   Date of Visit: 3/15/2021       To Whom it May Concern:    Christianne Devlin is under my professional care  He was seen in my office on 3/15/2021  He may return to work on 3/18/2021 with no restrictions  Please excuse from work 2/28/2021 through 3/17/2021 due to appendicitis  If you have any questions or concerns, please don't hesitate to call           Sincerely,          Glen Horn DO        CC: No Recipients

## 2021-03-15 NOTE — PROGRESS NOTES
Transition of Care  Follow-up After Hospitalization    Rosemary Boyd 36 y o  male   Date:  3/15/2021    TCM Call (since 2/12/2021)     Date and time call was made  3/3/2021  [address] reviewed  Records reviewed    Patient was hospitialized at  [address] [facility]        Date of Admission  02/28/21    Date of discharge  03/02/21    Diagnosis  ACUTE APPENDICITIS; INCIDENTAL FINDINGS F/U TO REPEAT COLONOSCOPY 1-2 WEEKS    Disposition  Home    Were the patients medications reviewed and updated  No    Current Symptoms  Loose Stools (Comment)  FROM THE ANTIBIOTICS    Loose stool severity  Moderate      TCM Call (since 2/12/2021)     Post hospital issues  None    Should patient be enrolled in anticoag monitoring? No    Scheduled for follow up? Yes (Comment)  03/15/21 at 9:30am     Referrals needed  AMBULATORY REFERRAL TO [facility]    Did you obtain your prescribed medications  Yes    Do you need help managing your prescriptions or medications  No    Is transportation to your appointment needed  No    I have advised the patient to call PCP with any new or worsening symptoms  Elly Carson          Hospital records were reviewed  Medications upon discharge reviewed/updated  Discharge Disposition:    Follow up visits with other specialists:       Assessment and Plan: We will get blood work on him today  Follow-up with GI on Wednesday 3/17  Follow-up here as scheduled or p r n  Catracho Chavis was seen today for transition of care management  Diagnoses and all orders for this visit:    Acute appendicitis, unspecified acute appendicitis type  -     Basic metabolic panel  -     CBC and differential            HPI:  Patient here today for OLIVIA  Patient was admitted for acute appendicitis  They decided to treat the patient with antibiotic 2 to him just having his Humira injection  Patient is currently taking Augmentin    Since leaving the hospital continues to have some discomfort in the right lower quadrant  Saturday night he had acute pain lasting a couple minutes  No fever chills  No nausea vomiting or diarrhea  ROS: Review of Systems   Constitutional: Negative  Respiratory: Negative  Cardiovascular: Negative  Gastrointestinal:        As per HPI   Genitourinary: Negative  Past Medical History:   Diagnosis Date    Rhabdomyolysis 2011       No past surgical history on file      Social History     Socioeconomic History    Marital status: Single     Spouse name: None    Number of children: None    Years of education: None    Highest education level: None   Occupational History    None   Social Needs    Financial resource strain: None    Food insecurity     Worry: None     Inability: None    Transportation needs     Medical: None     Non-medical: None   Tobacco Use    Smoking status: Never Smoker    Smokeless tobacco: Never Used   Substance and Sexual Activity    Alcohol use: Not Currently     Frequency: Monthly or less     Drinks per session: 1 or 2    Drug use: Never    Sexual activity: Not Currently   Lifestyle    Physical activity     Days per week: None     Minutes per session: None    Stress: None   Relationships    Social connections     Talks on phone: None     Gets together: None     Attends Latter day service: None     Active member of club or organization: None     Attends meetings of clubs or organizations: None     Relationship status: None    Intimate partner violence     Fear of current or ex partner: None     Emotionally abused: None     Physically abused: None     Forced sexual activity: None   Other Topics Concern    None   Social History Narrative    None       Family History   Problem Relation Age of Onset    No Known Problems Mother     No Known Problems Father     Diabetes Paternal Grandfather        Allergies   Allergen Reactions    Dilaudid [Hydromorphone]      Chest pain/pressure         Current Outpatient Medications:    amoxicillin-clavulanate (AUGMENTIN) 875-125 mg per tablet, Take 1 tablet by mouth every 12 (twelve) hours for 14 days, Disp: 28 tablet, Rfl: 0    Humira Pen 40 MG/0 4ML PNKT, Inject 40 mg under the skin every 2 weeks  , Disp: 6 each, Rfl: 3    acetaminophen (TYLENOL) 325 mg tablet, Take 2 tablets (650 mg total) by mouth every 6 (six) hours as needed for mild pain (Patient not taking: Reported on 3/15/2021), Disp:  , Rfl: 0    mesalamine (ROWASA) 4 g, Insert 60 mL (4 g total) into the rectum daily at bedtime (Patient not taking: Reported on 8/20/2020), Disp: 60 mL, Rfl: 12    omeprazole (PriLOSEC) 40 MG capsule, Take 1 capsule (40 mg total) by mouth daily before breakfast (Patient not taking: Reported on 8/20/2020), Disp: 30 capsule, Rfl: 3      Physical Exam:  /76   Temp 97 5 °F (36 4 °C)   Ht 6' 1" (1 854 m)   Wt 97 6 kg (215 lb 3 2 oz)   BMI 28 39 kg/m²     Physical Exam  Vitals signs reviewed  Constitutional:       General: He is not in acute distress  Appearance: Normal appearance  He is well-developed  He is not diaphoretic  HENT:      Head: Normocephalic and atraumatic  Eyes:      Conjunctiva/sclera: Conjunctivae normal    Cardiovascular:      Rate and Rhythm: Normal rate and regular rhythm  Heart sounds: Normal heart sounds  No murmur  No friction rub  No gallop  Pulmonary:      Effort: Pulmonary effort is normal  No respiratory distress  Breath sounds: Normal breath sounds  No wheezing or rales  Abdominal:      Tenderness: There is abdominal tenderness (Right lower quadrant)  Musculoskeletal:      Right lower leg: No edema  Left lower leg: No edema  Neurological:      Mental Status: He is alert and oriented to person, place, and time  Psychiatric:         Mood and Affect: Mood normal          Behavior: Behavior normal          Thought Content:  Thought content normal          Judgment: Judgment normal              Labs:  Lab Results   Component Value Date WBC 7 10 03/02/2021    HGB 14 3 03/02/2021    HCT 43 2 03/02/2021    MCV 89 03/02/2021     03/02/2021     Lab Results   Component Value Date     03/31/2015    K 3 8 03/02/2021     03/02/2021    CO2 27 03/02/2021    ANIONGAP 9 03/31/2015    BUN 8 03/02/2021    CREATININE 1 29 03/02/2021    GLUCOSE 93 03/31/2015    GLUF 82 06/23/2020    CALCIUM 8 6 03/02/2021    AST 12 03/01/2021    ALT 23 03/01/2021    ALKPHOS 92 03/01/2021    PROT 7 4 03/31/2015    BILITOT 0 6 03/31/2015    EGFR 74 03/02/2021

## 2021-03-16 ENCOUNTER — TELEPHONE (OUTPATIENT)
Dept: GASTROENTEROLOGY | Facility: CLINIC | Age: 31
End: 2021-03-16

## 2021-03-17 ENCOUNTER — OFFICE VISIT (OUTPATIENT)
Dept: GASTROENTEROLOGY | Facility: CLINIC | Age: 31
End: 2021-03-17
Payer: COMMERCIAL

## 2021-03-17 VITALS
RESPIRATION RATE: 18 BRPM | HEIGHT: 73 IN | SYSTOLIC BLOOD PRESSURE: 112 MMHG | TEMPERATURE: 97.6 F | BODY MASS INDEX: 28.1 KG/M2 | DIASTOLIC BLOOD PRESSURE: 72 MMHG | HEART RATE: 70 BPM | WEIGHT: 212 LBS

## 2021-03-17 DIAGNOSIS — K51.90 ULCERATIVE COLITIS WITHOUT COMPLICATIONS, UNSPECIFIED LOCATION (HCC): ICD-10-CM

## 2021-03-17 DIAGNOSIS — K35.80 ACUTE APPENDICITIS, UNSPECIFIED ACUTE APPENDICITIS TYPE: Primary | ICD-10-CM

## 2021-03-17 DIAGNOSIS — D84.9 IMMUNOCOMPROMISED STATE (HCC): ICD-10-CM

## 2021-03-17 LAB — CRP SERPL QL: <3 MG/L

## 2021-03-17 PROCEDURE — 3008F BODY MASS INDEX DOCD: CPT | Performed by: INTERNAL MEDICINE

## 2021-03-17 PROCEDURE — 99214 OFFICE O/P EST MOD 30 MIN: CPT | Performed by: INTERNAL MEDICINE

## 2021-03-17 PROCEDURE — 1036F TOBACCO NON-USER: CPT | Performed by: INTERNAL MEDICINE

## 2021-03-17 NOTE — PATIENT INSTRUCTIONS
Called to get his CT Abdomen scheduled on 3/24/2021 @8:30 am,  2 month follow up with Dr Khanh Sanchez  On 6/23/2021 @ 2:20 pm  Fay Grief over instructions, patient expressed understanding

## 2021-03-17 NOTE — PROGRESS NOTES
[facility] - Outpatient Follow-up Note  Ariane Goff 36 y o  male MRN: [id number]  Encounter: [id number]          ASSESSMENT AND PLAN:    Ariane Goff is a 36 y o  male who presents with complaint of pan-UC diagnosed 2019 doing well on Humira but with recent pain and hospitalization for acute appendicitis, seen by surgery with plan to treat conservatively with antibiotics  Since discharge he has had intermittent and transient pain but overall is well  His colitis improved significantly with Humira and put him close to clinical remission  No significant GERD anymore  Last Bmp with Cr of 1 37  Last LFTs normal (including albumin)  Normal CBC  Last fecal calprotectin 179  Last Humira level 6/2020 was good at 7 7 without ADA  Available labs and imaging reviewed  1  Acute appendicitis, unspecified acute appendicitis type    2  Ulcerative colitis without complications, unspecified location ([facility] [address] )    3  Immunocompromised state ([facility] [address] )        Orders Placed This Encounter   Procedures    CT abdomen pelvis w contrast    C-reactive protein     CT scan to evaluate for changes of his appendicitis  Continue Humira  Check a Humira level at his next visit  Repeat CRP today  Repeat blood work at his next visit  Upcoming colonoscopy  Avoid NSAIDs, smoking  IBD HCM to be discussed  Routine dermatology follow-up  ______________________________________________________________________    SUBJECTIVE:    Ariane Goff is a 36 y o  male who presents with complaint of UC and appendicitis    Recent hospitalization for appendicitis  He had some intermittent abdominal pain since he left the hospital, close to the most severe it was but not there  This past Saturday he was sitting in bed and had severe pain for 1 5 minutes and then it went away  No pain until the next day which was not as bad  Last evening sharp pain  Always in the right lower quadrant  He has some bloating   Normal BMs for the most part  He has some need for a second BM  No blood in the stool or black stools  Weight has been stable since he was hospital  Eating okay  He avoids foods that cause heartburn or dysphagia and change his eating habits to help with heartburn and dysphagia    REVIEW OF SYSTEMS IS OTHERWISE NEGATIVE  10 point ROS reviewed and negative, except as above      Historical Information   Past Medical History:   Diagnosis Date    Rhabdomyolysis 2011     No past surgical history on file  Social History   Social History     Substance and Sexual Activity   Alcohol Use Not Currently    Frequency: Monthly or less    Drinks per session: 1 or 2     Social History     Substance and Sexual Activity   Drug Use Never     Social History     Tobacco Use   Smoking Status Never Smoker   Smokeless Tobacco Never Used     Family History   Problem Relation Age of Onset    No Known Problems Mother     No Known Problems Father     Diabetes Paternal Grandfather        Meds/Allergies       Current Outpatient Medications:     acetaminophen (TYLENOL) 325 mg tablet    Humira Pen 40 MG/0 4ML PNKT    mesalamine (ROWASA) 4 g    omeprazole (PriLOSEC) 40 MG capsule    Allergies   Allergen Reactions    Dilaudid [Hydromorphone]      Chest pain/pressure           Objective     Blood pressure 112/72, pulse 70, temperature 97 6 °F (36 4 °C), temperature source Tympanic, resp  rate 18, height 6' 1" (1 854 m), weight 96 2 kg (212 lb)  Body mass index is 27 97 kg/m²  PHYSICAL EXAMINATION:    General Appearance:   Alert, cooperative, no distress   HEENT:  Normocephalic, atraumatic, anicteric  Neck supple, symmetrical, trachea midline  Lungs:   Equal chest rise and unlabored breathing, normal effort, no coughing  Cardiovascular:   No visualized JVD  Abdomen:   No abdominal distension  Skin:   No jaundice, rashes, or lesions  Musculoskeletal:   Normal range of motion visualized  Psych:  Normal affect and normal insight     Neuro:  Alert and appropriate  Lab Results:   No visits with results within 1 Day(s) from this visit     Latest known visit with results is:   Office Visit on 03/15/2021   Component Date Value    Sodium 03/15/2021 141     Potassium 03/15/2021 4 1     Chloride 03/15/2021 107     CO2 03/15/2021 30     ANION GAP 03/15/2021 4     BUN 03/15/2021 14     Creatinine 03/15/2021 1 37*    Glucose, Fasting 03/15/2021 86     Calcium 03/15/2021 9 2     eGFR 03/15/2021 69     WBC 03/15/2021 6 08     RBC 03/15/2021 5 62     Hemoglobin 03/15/2021 16 5     Hematocrit 03/15/2021 50 0*    MCV 03/15/2021 89     MCH 03/15/2021 29 4     MCHC 03/15/2021 33 0     RDW 03/15/2021 12 8     MPV 03/15/2021 11 3     Platelets 33/76/2862 249     nRBC 03/15/2021 0     Neutrophils Relative 03/15/2021 43     Immat GRANS % 03/15/2021 0     Lymphocytes Relative 03/15/2021 39     Monocytes Relative 03/15/2021 7     Eosinophils Relative 03/15/2021 10*    Basophils Relative 03/15/2021 1     Neutrophils Absolute 03/15/2021 2 55     Immature Grans Absolute 03/15/2021 0 01     Lymphocytes Absolute 03/15/2021 2 39     Monocytes Absolute 03/15/2021 0 45     Eosinophils Absolute 03/15/2021 0 60     Basophils Absolute 03/15/2021 0 08        Lab Results   Component Value Date    WBC 6 08 03/15/2021    HGB 16 5 03/15/2021    HCT 50 0 (H) 03/15/2021    MCV 89 03/15/2021     03/15/2021       Lab Results   Component Value Date     03/31/2015    SODIUM 141 03/15/2021    K 4 1 03/15/2021     03/15/2021    CO2 30 03/15/2021    ANIONGAP 9 03/31/2015    AGAP 4 03/15/2021    BUN 14 03/15/2021    CREATININE 1 37 (H) 03/15/2021    GLUC 87 03/02/2021    GLUF 86 03/15/2021    CALCIUM 9 2 03/15/2021    AST 12 03/01/2021    ALT 23 03/01/2021    ALKPHOS 92 03/01/2021    PROT 7 4 03/31/2015    TP 7 6 03/01/2021    BILITOT 0 6 03/31/2015    TBILI 0 50 03/01/2021    EGFR 69 03/15/2021       Lab Results   Component Value Date    CRP <3 0 06/23/2020       Lab Results   Component Value Date    [id number] 0 954 01/26/2018       No results found for: IRON, TIBC, FERRITIN    Radiology Results:   Ct Abdomen Pelvis With Contrast    Result Date: 2/28/2021  Narrative: CT ABDOMEN AND PELVIS WITH IV CONTRAST INDICATION:   RLQ abdominal pain, appendicitis suspected (Age => 16y) RLQ abdominal pain  COMPARISON:  June 24, 2019 TECHNIQUE:  CT examination of the abdomen and pelvis was performed  Axial, sagittal, and coronal 2D reformatted images were created from the source data and submitted for interpretation  Radiation dose length product (DLP) for this visit:  693 49 mGy-cm   This examination, like all CT scans performed in the [facility], was performed utilizing techniques to minimize radiation dose exposure, including the use of iterative  reconstruction and automated exposure control  IV Contrast:  100 mL of iohexol (OMNIPAQUE) Enteric Contrast:  Enteric contrast was not administered  FINDINGS: ABDOMEN LOWER CHEST:  No clinically significant abnormality identified in the visualized lower chest  LIVER/BILIARY TREE:  Unremarkable  GALLBLADDER:  No calcified gallstones  No pericholecystic inflammatory change  SPLEEN:  Unremarkable  PANCREAS:  Unremarkable  ADRENAL GLANDS:  Unremarkable  KIDNEYS/URETERS:  Unremarkable  No hydronephrosis  STOMACH AND BOWEL:  Underdistended bowel limiting its evaluation  No evidence of bowel obstruction  APPENDIX:  The appendix measures up to 8 mm with minimal surrounding inflammatory changes ABDOMINOPELVIC CAVITY:  No ascites  No pneumoperitoneum  No lymphadenopathy  VESSELS:  Unremarkable for patient's age  PELVIS REPRODUCTIVE ORGANS:  Unremarkable for patient's age  URINARY BLADDER:  Unremarkable  ABDOMINAL WALL/INGUINAL REGIONS:  Unremarkable  OSSEOUS STRUCTURES:  No acute fracture or destructive osseous lesion  Impression: Dilated appendix with minimal surrounding inflammatory changes    Findings are suspicious for early acute appendicitis    I personally discussed this study with Julio Ramachandran on 2/28/2021 at 9:33 PM  Workstation performed: [id number]

## 2021-03-24 ENCOUNTER — HOSPITAL ENCOUNTER (OUTPATIENT)
Dept: CT IMAGING | Facility: HOSPITAL | Age: 31
Discharge: HOME | End: 2021-03-24
Attending: INTERNAL MEDICINE
Payer: COMMERCIAL

## 2021-03-24 ENCOUNTER — TELEPHONE (OUTPATIENT)
Dept: FAMILY MEDICINE CLINIC | Facility: CLINIC | Age: 31
End: 2021-03-24

## 2021-03-24 DIAGNOSIS — K35.80 ACUTE APPENDICITIS, UNSPECIFIED ACUTE APPENDICITIS TYPE: ICD-10-CM

## 2021-03-24 PROCEDURE — G1004 CDSM NDSC: HCPCS

## 2021-03-24 PROCEDURE — 74177 CT ABD & PELVIS W/CONTRAST: CPT

## 2021-03-24 RX ADMIN — IOHEXOL 100 ML: 350 INJECTION, SOLUTION INTRAVENOUS at 08:47

## 2021-03-24 NOTE — TELEPHONE ENCOUNTER
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 Anything to do to fix it? I noticed it was not a result on the 2- ct scan but for this scan I also drank the barium

## 2021-05-28 ENCOUNTER — HOSPITAL ENCOUNTER (OUTPATIENT)
Dept: PERIOP | Facility: HOSPITAL | Age: 31
Setting detail: OUTPATIENT SURGERY
Discharge: HOME | End: 2021-05-28
Attending: INTERNAL MEDICINE

## 2021-06-16 ENCOUNTER — TELEPHONE (OUTPATIENT)
Dept: GASTROENTEROLOGY | Facility: MEDICAL CENTER | Age: 31
End: 2021-06-16

## 2021-06-16 NOTE — TELEPHONE ENCOUNTER
Hi,    Can you see if the patient is willing to reschedule his cancelled colonoscopy with me  Perhaps he can do it next week or the following week       Thank you

## 2021-06-18 ENCOUNTER — PREP FOR PROCEDURE (OUTPATIENT)
Dept: SURGERY | Facility: CLINIC | Age: 31
End: 2021-06-18

## 2021-06-18 DIAGNOSIS — K51.90 ULCERATIVE COLITIS WITHOUT COMPLICATIONS, UNSPECIFIED LOCATION (HCC): Primary | ICD-10-CM

## 2021-06-18 NOTE — TELEPHONE ENCOUNTER
Pt r/s colon for 6/23 has all instructions, he was also to have a follow up 6/23 so I canceled that please let us know when he needs to follow up after his colon next week!  Thanks!!

## 2021-06-21 ENCOUNTER — TELEPHONE (OUTPATIENT)
Dept: SURGERY | Facility: HOSPITAL | Age: 31
End: 2021-06-21

## 2021-06-22 ENCOUNTER — TELEPHONE (OUTPATIENT)
Dept: SURGERY | Facility: HOSPITAL | Age: 31
End: 2021-06-22

## 2021-06-23 ENCOUNTER — ANESTHESIA EVENT (OUTPATIENT)
Dept: PERIOP | Facility: HOSPITAL | Age: 31
End: 2021-06-23

## 2021-06-23 ENCOUNTER — HOSPITAL ENCOUNTER (OUTPATIENT)
Dept: PERIOP | Facility: HOSPITAL | Age: 31
Setting detail: OUTPATIENT SURGERY
Discharge: HOME | End: 2021-06-23
Attending: INTERNAL MEDICINE | Admitting: INTERNAL MEDICINE
Payer: COMMERCIAL

## 2021-06-23 ENCOUNTER — ANESTHESIA (OUTPATIENT)
Dept: PERIOP | Facility: HOSPITAL | Age: 31
End: 2021-06-23
Payer: COMMERCIAL

## 2021-06-23 VITALS
HEART RATE: 68 BPM | DIASTOLIC BLOOD PRESSURE: 80 MMHG | OXYGEN SATURATION: 98 % | SYSTOLIC BLOOD PRESSURE: 116 MMHG | RESPIRATION RATE: 18 BRPM | TEMPERATURE: 97.7 F

## 2021-06-23 DIAGNOSIS — K51.90 ULCERATIVE COLITIS WITHOUT COMPLICATIONS, UNSPECIFIED LOCATION (HCC): ICD-10-CM

## 2021-06-23 PROCEDURE — 45380 COLONOSCOPY AND BIOPSY: CPT | Performed by: INTERNAL MEDICINE

## 2021-06-23 PROCEDURE — 88302 TISSUE EXAM BY PATHOLOGIST: CPT | Performed by: PATHOLOGY

## 2021-06-23 PROCEDURE — 88305 TISSUE EXAM BY PATHOLOGIST: CPT | Performed by: PATHOLOGY

## 2021-06-23 RX ORDER — SODIUM CHLORIDE, SODIUM LACTATE, POTASSIUM CHLORIDE, CALCIUM CHLORIDE 600; 310; 30; 20 MG/100ML; MG/100ML; MG/100ML; MG/100ML
125 INJECTION, SOLUTION INTRAVENOUS CONTINUOUS
Status: DISCONTINUED | OUTPATIENT
Start: 2021-06-23 | End: 2021-06-27 | Stop reason: HOSPADM

## 2021-06-23 RX ORDER — LIDOCAINE HYDROCHLORIDE 10 MG/ML
INJECTION, SOLUTION EPIDURAL; INFILTRATION; INTRACAUDAL; PERINEURAL AS NEEDED
Status: DISCONTINUED | OUTPATIENT
Start: 2021-06-23 | End: 2021-06-23

## 2021-06-23 RX ORDER — PROPOFOL 10 MG/ML
INJECTION, EMULSION INTRAVENOUS AS NEEDED
Status: DISCONTINUED | OUTPATIENT
Start: 2021-06-23 | End: 2021-06-23

## 2021-06-23 RX ORDER — ONDANSETRON 2 MG/ML
4 INJECTION INTRAMUSCULAR; INTRAVENOUS ONCE AS NEEDED
Status: DISCONTINUED | OUTPATIENT
Start: 2021-06-23 | End: 2021-06-27 | Stop reason: HOSPADM

## 2021-06-23 RX ADMIN — PROPOFOL 100 MG: 10 INJECTION, EMULSION INTRAVENOUS at 12:52

## 2021-06-23 RX ADMIN — PROPOFOL 50 MG: 10 INJECTION, EMULSION INTRAVENOUS at 13:00

## 2021-06-23 RX ADMIN — PROPOFOL 50 MG: 10 INJECTION, EMULSION INTRAVENOUS at 12:57

## 2021-06-23 RX ADMIN — LIDOCAINE HYDROCHLORIDE 50 MG: 10 INJECTION, SOLUTION EPIDURAL; INFILTRATION; INTRACAUDAL; PERINEURAL at 12:48

## 2021-06-23 RX ADMIN — SODIUM CHLORIDE, SODIUM LACTATE, POTASSIUM CHLORIDE, AND CALCIUM CHLORIDE 125 ML/HR: .6; .31; .03; .02 INJECTION, SOLUTION INTRAVENOUS at 11:54

## 2021-06-23 RX ADMIN — PROPOFOL 150 MG: 10 INJECTION, EMULSION INTRAVENOUS at 12:48

## 2021-06-23 RX ADMIN — PROPOFOL 50 MG: 10 INJECTION, EMULSION INTRAVENOUS at 13:05

## 2021-06-23 NOTE — ANESTHESIA POSTPROCEDURE EVALUATION
Post-Op Assessment Note    CV Status:  Stable  Pain Score: 0    Pain management: adequate     Mental Status:  Sleepy   Hydration Status:  Euvolemic   PONV Controlled:  Controlled   Airway Patency:  Patent      Post Op Vitals Reviewed: Yes      Staff: CRNA         No complications documented      BP      Temp     Pulse     Resp      SpO2

## 2021-06-23 NOTE — H&P
History and Physical -  [facility]  Yvonne Ca 36 y o  male MRN: [id number]                  HPI: Yvonne Ca is a 36y o  year old male who presents for UC and recent appendicitis  REVIEW OF SYSTEMS: Per the HPI, and otherwise unremarkable  Historical Information   Past Medical History:   Diagnosis Date    Rhabdomyolysis 2011    Ulcerative colitis ([facility] [address] )      Past Surgical History:   Procedure Laterality Date    COLONOSCOPY      WISDOM TOOTH EXTRACTION       Social History   Social History     Substance and Sexual Activity   Alcohol Use Not Currently     Social History     Substance and Sexual Activity   Drug Use Never     Social History     Tobacco Use   Smoking Status Never Smoker   Smokeless Tobacco Never Used     Family History   Problem Relation Age of Onset    No Known Problems Mother     No Known Problems Father     Diabetes Paternal Grandfather        Meds/Allergies     (Not in a hospital admission)      Allergies   Allergen Reactions    Dilaudid [Hydromorphone]      Chest pain/pressure       Objective     Blood pressure 124/74, pulse 71, temperature (!) 97 2 °F (36 2 °C), temperature source Tympanic, resp  rate 18, SpO2 98 %  PHYSICAL EXAMINATION:    General Appearance:   Alert, cooperative, no distress   HEENT:  Normocephalic, atraumatic, anicteric  Neck supple, symmetrical, trachea midline  Lungs:   Equal chest rise and unlabored breathing, normal effort, no coughing  Cardiovascular:   No visualized JVD  Abdomen:   No abdominal distension  Skin:   No jaundice, rashes, or lesions  Musculoskeletal:   Normal range of motion visualized  Psych:  Normal affect and normal insight  Neuro:  Alert and appropriate  ASSESSMENT/PLAN:  This is a 36y o  year old male here for colonoscopy, and he is stable and optimized for his procedure

## 2021-06-23 NOTE — ANESTHESIA PREPROCEDURE EVALUATION
Procedure:  COLONOSCOPY    Relevant Problems   ANESTHESIA (within normal limits)      CARDIO (within normal limits)      ENDO (within normal limits)      /RENAL   (+) OMA (acute kidney injury) ([facility] [address] )      HEMATOLOGY   (+) Immunocompromised state ([facility] )      MUSCULOSKELETAL   (+) Chronic right-sided low back pain with right-sided sciatica      PULMONARY (within normal limits)        Physical Exam    Airway    Mallampati score: I  TM Distance: >3 FB  Neck ROM: full     Dental   No notable dental hx     Cardiovascular  Rhythm: regular, Rate: normal,     Pulmonary  Breath sounds clear to auscultation,     Other Findings        Anesthesia Plan  ASA Score- 2     Anesthesia Type- IV sedation with anesthesia with ASA Monitors  Additional Monitors:   Airway Plan:           Plan Factors-Exercise tolerance (METS): >4 METS  Chart reviewed  Existing labs reviewed  Patient summary reviewed  Patient is not a current smoker  Induction- intravenous  Postoperative Plan-     Informed Consent- Anesthetic plan and risks discussed with patient  I personally reviewed this patient with the CRNA  Discussed and agreed on the Anesthesia Plan with the CRNA  Mallika Ga

## 2021-09-14 ENCOUNTER — NURSE TRIAGE (OUTPATIENT)
Dept: OTHER | Facility: OTHER | Age: 31
End: 2021-09-14

## 2021-09-14 DIAGNOSIS — K51.019 ULCERATIVE PANCOLITIS WITH COMPLICATION (HCC): ICD-10-CM

## 2021-09-14 RX ORDER — ADALIMUMAB 40MG/0.4ML
KIT SUBCUTANEOUS
Qty: 1 EACH | Refills: 0 | Status: SHIPPED | OUTPATIENT
Start: 2021-09-14 | End: 2021-09-15 | Stop reason: SDUPTHER

## 2021-09-14 NOTE — TELEPHONE ENCOUNTER
Regarding: Med refill- Humira Pen 40 MG/0 4ML PNKT  ----- Message from Fallon Deleon sent at 9/14/2021  6:08 PM EDT -----  "I am completely out of my prescription for Humira Pen 40 MG/0 4ML PNKT and have been going back and forth with the pharmacy for days  "

## 2021-09-14 NOTE — TELEPHONE ENCOUNTER
Reason for Disposition   [1] Caller requesting a prescription renewal (no refills left), no triage required, AND [2] triager able to renew prescription per department policy    Answer Assessment - Initial Assessment Questions  1  NAME of MEDICATION: "What medicine are you calling about?"      Humira Pen 40mg/0 4ml    2  QUESTION: "What is your question?" (e g , medication refill, side effect)      All out of    3  PRESCRIBING HCP: "Who prescribed it?" Reason: if prescribed by specialist, call should be referred to that group       Dr Griffin Croft    Protocols used: MEDICATION QUESTION CALL-ADULT-

## 2021-09-15 ENCOUNTER — DOCUMENTATION (OUTPATIENT)
Dept: GASTROENTEROLOGY | Facility: MEDICAL CENTER | Age: 31
End: 2021-09-15

## 2021-09-15 ENCOUNTER — TELEPHONE (OUTPATIENT)
Dept: GASTROENTEROLOGY | Facility: CLINIC | Age: 31
End: 2021-09-15

## 2021-09-15 DIAGNOSIS — K51.019 ULCERATIVE PANCOLITIS WITH COMPLICATION (HCC): ICD-10-CM

## 2021-09-15 RX ORDER — ADALIMUMAB 40MG/0.4ML
KIT SUBCUTANEOUS
Qty: 4 EACH | Refills: 6 | Status: SHIPPED | OUTPATIENT
Start: 2021-09-15 | End: 2021-10-05

## 2021-09-15 NOTE — TELEPHONE ENCOUNTER
I called and spoke with patient he needs new script with refills , please place new script with refills,  I then can call in verbal      Thank You

## 2021-09-15 NOTE — TELEPHONE ENCOUNTER
Hi,     New script placed in the chart      Thank you          Documentation    You  Hattie Palacios MD 1 hour ago (8:08 AM)   AB     I called and spoke with patient he needs new script with refills , please place new script with refills,  I then can call in verbal        Thank You         Documentation    Hattie Palacios MD  You 1 hour ago (8:02 AM)   YS     Hi Desiree,     Can we make sure he has enough Humira Pens?     Thank you          Documentation    Rush Shown Person, RN  Hattie Palacios MD 14 hours ago (7:17 PM)     Ordered one Humira pen for patient, patient would like the rest of the pens ordered    Routing comment

## 2021-10-05 DIAGNOSIS — K51.019 ULCERATIVE PANCOLITIS WITH COMPLICATION (HCC): ICD-10-CM

## 2021-10-05 RX ORDER — ADALIMUMAB 40MG/0.4ML
KIT SUBCUTANEOUS
Qty: 2 EACH | Refills: 0 | Status: SHIPPED | OUTPATIENT
Start: 2021-10-05 | End: 2021-10-31 | Stop reason: SDUPTHER

## 2021-10-31 DIAGNOSIS — K51.019 ULCERATIVE PANCOLITIS WITH COMPLICATION (HCC): ICD-10-CM

## 2021-10-31 RX ORDER — ADALIMUMAB 40MG/0.4ML
KIT SUBCUTANEOUS
Qty: 2 EACH | Refills: 0 | Status: SHIPPED | OUTPATIENT
Start: 2021-10-31 | End: 2021-12-02

## 2021-12-01 ENCOUNTER — HOSPITAL ENCOUNTER (EMERGENCY)
Facility: HOSPITAL | Age: 31
Discharge: HOME | End: 2021-12-01
Attending: EMERGENCY MEDICINE | Admitting: EMERGENCY MEDICINE
Payer: OTHER MISCELLANEOUS

## 2021-12-01 ENCOUNTER — APPOINTMENT (EMERGENCY)
Dept: CT IMAGING | Facility: HOSPITAL | Age: 31
End: 2021-12-01
Payer: OTHER MISCELLANEOUS

## 2021-12-01 VITALS
SYSTOLIC BLOOD PRESSURE: 138 MMHG | HEART RATE: 91 BPM | RESPIRATION RATE: 18 BRPM | TEMPERATURE: 98.2 F | DIASTOLIC BLOOD PRESSURE: 82 MMHG | OXYGEN SATURATION: 94 % | WEIGHT: 211.64 LBS | BODY MASS INDEX: 27.92 KG/M2

## 2021-12-01 DIAGNOSIS — M54.9 BACK PAIN: Primary | ICD-10-CM

## 2021-12-01 PROCEDURE — 99284 EMERGENCY DEPT VISIT MOD MDM: CPT

## 2021-12-01 PROCEDURE — 99284 EMERGENCY DEPT VISIT MOD MDM: CPT | Performed by: PHYSICIAN ASSISTANT

## 2021-12-01 PROCEDURE — G1004 CDSM NDSC: HCPCS

## 2021-12-01 PROCEDURE — 72131 CT LUMBAR SPINE W/O DYE: CPT

## 2021-12-01 RX ORDER — CYCLOBENZAPRINE HCL 5 MG
5 TABLET ORAL
Qty: 20 TABLET | Refills: 0 | Status: SHIPPED | OUTPATIENT
Start: 2021-12-01

## 2021-12-02 ENCOUNTER — APPOINTMENT (OUTPATIENT)
Dept: URGENT CARE | Facility: CLINIC | Age: 31
End: 2021-12-02
Payer: OTHER MISCELLANEOUS

## 2021-12-02 DIAGNOSIS — K51.019 ULCERATIVE PANCOLITIS WITH COMPLICATION (HCC): ICD-10-CM

## 2021-12-02 PROCEDURE — G0382 LEV 3 HOSP TYPE B ED VISIT: HCPCS

## 2021-12-02 PROCEDURE — 99283 EMERGENCY DEPT VISIT LOW MDM: CPT

## 2021-12-02 RX ORDER — ADALIMUMAB 40MG/0.4ML
KIT SUBCUTANEOUS
Qty: 2 EACH | Refills: 0 | Status: SHIPPED | OUTPATIENT
Start: 2021-12-02 | End: 2021-12-30 | Stop reason: SDUPTHER

## 2021-12-03 ENCOUNTER — APPOINTMENT (OUTPATIENT)
Dept: URGENT CARE | Facility: CLINIC | Age: 31
End: 2021-12-03
Payer: OTHER MISCELLANEOUS

## 2021-12-03 PROCEDURE — 99214 OFFICE O/P EST MOD 30 MIN: CPT

## 2021-12-08 ENCOUNTER — APPOINTMENT (OUTPATIENT)
Dept: URGENT CARE | Facility: CLINIC | Age: 31
End: 2021-12-08
Payer: OTHER MISCELLANEOUS

## 2021-12-08 PROCEDURE — 99214 OFFICE O/P EST MOD 30 MIN: CPT | Performed by: FAMILY MEDICINE

## 2021-12-13 ENCOUNTER — TELEPHONE (OUTPATIENT)
Dept: OBGYN CLINIC | Facility: HOSPITAL | Age: 31
End: 2021-12-13

## 2021-12-15 ENCOUNTER — APPOINTMENT (OUTPATIENT)
Dept: URGENT CARE | Facility: CLINIC | Age: 31
End: 2021-12-15
Payer: OTHER MISCELLANEOUS

## 2021-12-15 PROCEDURE — 99214 OFFICE O/P EST MOD 30 MIN: CPT

## 2021-12-28 ENCOUNTER — APPOINTMENT (OUTPATIENT)
Dept: URGENT CARE | Facility: CLINIC | Age: 31
End: 2021-12-28
Payer: OTHER MISCELLANEOUS

## 2021-12-28 PROCEDURE — 99214 OFFICE O/P EST MOD 30 MIN: CPT

## 2021-12-30 DIAGNOSIS — K51.019 ULCERATIVE PANCOLITIS WITH COMPLICATION (HCC): ICD-10-CM

## 2021-12-30 RX ORDER — ADALIMUMAB 40MG/0.4ML
KIT SUBCUTANEOUS
Qty: 2 EACH | Refills: 0 | Status: SHIPPED | OUTPATIENT
Start: 2021-12-30 | End: 2022-01-31 | Stop reason: SDUPTHER

## 2022-01-19 ENCOUNTER — APPOINTMENT (OUTPATIENT)
Dept: URGENT CARE | Facility: CLINIC | Age: 32
End: 2022-01-19
Payer: OTHER MISCELLANEOUS

## 2022-01-19 PROCEDURE — 99213 OFFICE O/P EST LOW 20 MIN: CPT

## 2022-02-16 ENCOUNTER — APPOINTMENT (OUTPATIENT)
Dept: URGENT CARE | Facility: CLINIC | Age: 32
End: 2022-02-16
Payer: OTHER MISCELLANEOUS

## 2022-02-16 PROCEDURE — 99213 OFFICE O/P EST LOW 20 MIN: CPT

## 2022-02-17 ENCOUNTER — TELEPHONE (OUTPATIENT)
Dept: GASTROENTEROLOGY | Facility: CLINIC | Age: 32
End: 2022-02-17

## 2022-02-17 NOTE — TELEPHONE ENCOUNTER
Patients GI provider:  Dr Chadd Ladd    Number to return call: [phone]    Reason for call: Pt called wanting to see if the provider can fill out his FMLA re-certification that was originally filled out by Chadd Ladd or will he have to schedule an appt to be seen so it can be filled out   Above is his number     Scheduled procedure/appointment date if applicable: Apt/procedure NA

## 2022-02-17 NOTE — TELEPHONE ENCOUNTER
Called and spoke with patient  I advised he should have follow up appt regardless of FMLA because he has hx of UC and is on humira, last appt was 1 year ago  He verbalized understanding   Scheduled follow up with Dr Emerson Ascencio

## 2022-02-24 ENCOUNTER — TELEPHONE (OUTPATIENT)
Dept: GASTROENTEROLOGY | Facility: MEDICAL CENTER | Age: 32
End: 2022-02-24

## 2022-02-25 ENCOUNTER — TELEMEDICINE (OUTPATIENT)
Dept: GASTROENTEROLOGY | Facility: MEDICAL CENTER | Age: 32
End: 2022-02-25
Payer: COMMERCIAL

## 2022-02-25 DIAGNOSIS — D84.9 IMMUNOCOMPROMISED STATE (HCC): ICD-10-CM

## 2022-02-25 DIAGNOSIS — R10.9 ABDOMINAL PAIN, UNSPECIFIED ABDOMINAL LOCATION: ICD-10-CM

## 2022-02-25 DIAGNOSIS — E55.9 VITAMIN D DEFICIENCY: ICD-10-CM

## 2022-02-25 DIAGNOSIS — K51.90 ULCERATIVE COLITIS WITHOUT COMPLICATIONS, UNSPECIFIED LOCATION (HCC): Primary | ICD-10-CM

## 2022-02-25 DIAGNOSIS — R12 HEARTBURN: ICD-10-CM

## 2022-02-25 DIAGNOSIS — R11.0 NAUSEA: ICD-10-CM

## 2022-02-25 DIAGNOSIS — R14.0 BLOATING: ICD-10-CM

## 2022-02-25 PROCEDURE — 99214 OFFICE O/P EST MOD 30 MIN: CPT | Performed by: INTERNAL MEDICINE

## 2022-02-25 NOTE — PROGRESS NOTES
Virtual Regular Visit    Verification of patient location:    Patient is located in the following state in which I hold an active license [place]      Assessment/Plan:  28 yo man with pan-UC diagnosed in 2019 and doing well on Humira, prior appendicitis treated conservatively who presents for follow-up  He has been mostly well on the Humira with occasional increased stools secondary to dietary indiscretion or constipatipon  He has occasional abdominal pain relieved with a BM, that could reflect some IBS or colon spasm  Last colonoscopy with mostly quiescent colitis  Last BMP with Cr 1 37  Last LFTs normal  CRP normal  Overall he is in clinical remission       Problem List Items Addressed This Visit        Digestive    Ulcerative colitis ([facility] ) - Primary    Relevant Orders    CBC and differential    Comprehensive metabolic panel    C-reactive protein    Quantiferon TB Gold Plus    Chronic Hepatitis Panel    ADALIMUMAB CONCENTRATION AND ANTI-ADALIMUMAB AB    Vitamin D 25 hydroxy       Other    Immunocompromised state ([facility] )    Relevant Orders    CBC and differential    Comprehensive metabolic panel    C-reactive protein    Quantiferon TB Gold Plus    Chronic Hepatitis Panel    ADALIMUMAB CONCENTRATION AND ANTI-ADALIMUMAB AB    Vitamin D 25 hydroxy      Other Visit Diagnoses     Bloating        Heartburn        Nausea        Abdominal pain, unspecified abdominal location        Vitamin D deficiency        Relevant Orders    Vitamin D 25 hydroxy        Continue Humira every other week  Repeat blood work including Humira level  IBD HCM discussed with the patient  Consider repeat colonoscopy in Spring 2740-7529  No need for PPI at this time and recommend dietary control         Reason for visit is   Chief Complaint   Patient presents with    Virtual Regular Visit        Encounter provider Romario Dudley MD    Provider located at [address]  [address]  [facility] [address]      Recent Visits  Date Type Provider Dept   02/24/22 Telephone Jeana Brown   Showing recent visits within past 7 days and meeting all other requirements  Today's Visits  Date Type Provider Dept   02/25/22 Telemedicine MD Joshua [place]   Showing today's visits and meeting all other requirements  Future Appointments  No visits were found meeting these conditions  Showing future appointments within next 150 days and meeting all other requirements       The patient was identified by name and date of birth  Avni Davila was informed that this is a telemedicine visit and that the visit is being conducted through [facility], located within [facility] and patient was informed this is a secure, HIPAA-complaint platform  He agrees to proceed     My office door was closed  No one else was in the room  He acknowledged consent and understanding of privacy and security of the video platform  The patient has agreed to participate and understands they can discontinue the visit at any time  Patient is aware this is a billable service  Subjective  Avni Davila is a 29 y o  male with UC here for follow-up  He feels like the Humira works well and if he is late with a dose he feels it  He has 2-3 formed or semi-formed BMs per day on average (unless he eats certain foods, in particular peppers, spicy foods and he tries to avoid them)  They can also cause heartburn  Those days it can be semi-solid or mucous  He had a small amount of orange color in January but usually no blood  If he is constipated that might be followed by a day with increased stools  He can get bloated when constipated  He has occasional abdominal pain, last episode yesterday  Left sided 2 inches below his umbilicus  He gets pain there if constipated but he can get it randomly as well  Usually no urgency but it can happen occasionally  He is able to hold it in  No rashes, mouth sores, joint pains  He has some black and blue marks at the injection site for the Humira  Some occasional heartburn  He has to drink something first to prevent symptoms  He might have nausea and vomiting from the heartburn  No dysphagia, odynophagia (but sometimes it feels like his food goes down very slowly)    Past Medical History:   Diagnosis Date    Rhabdomyolysis 2011    Ulcerative colitis ([address] )        Past Surgical History:   Procedure Laterality Date    COLONOSCOPY      WISDOM TOOTH EXTRACTION         Current Outpatient Medications   Medication Sig Dispense Refill    cyclobenzaprine (FLEXERIL) 5 mg tablet Take 1 tablet (5 mg total) by mouth daily at bedtime 20 tablet 0    Humira Pen 40 MG/0 4ML PNKT INJECT 40 MG UNDER THE SKIN (SUBCUTANEOUS INJECTION) EVERY TWO WEEKS 4 each 6     No current facility-administered medications for this visit  Allergies   Allergen Reactions    Dilaudid [Hydromorphone]      Chest pain/pressure       REVIEW OF SYSTEMS:  10 point ROS reviewed and negative, except as above          PHYSICAL EXAMINATION:  Appearance and vitals taken from home devices    General Appearance:   Alert, cooperative, no distress   HEENT:  Normocephalic, atraumatic, anicteric  Neck supple, symmetrical, trachea midline  Lungs:   Equal chest rise and unlabored breathing, normal effort, no coughing  Cardiovascular:   No visualized JVD  Abdomen:   No abdominal distension  Skin:   No jaundice, rashes, or lesions  Musculoskeletal:   Normal range of motion visualized  Psych:  Normal affect and normal insight  Neuro:  Alert and appropriate  I spent 20 minutes directly with the patient during this visit    Javier Calloway verbally agrees to participate in [employer]   Pt is aware that [employer] could be limited without vital signs or the ability to perform a full hands-on physical exam  Robin Hector understands he or the provider may request at any time to terminate the video visit and request the patient to seek care or treatment in person

## 2022-02-28 ENCOUNTER — TELEPHONE (OUTPATIENT)
Dept: GASTROENTEROLOGY | Facility: CLINIC | Age: 32
End: 2022-02-28

## 2022-02-28 NOTE — TELEPHONE ENCOUNTER
Called LMMARISELA to call the office back, wanted to let him know that the paper work is filled out that [address] filled them out

## 2022-02-28 NOTE — TELEPHONE ENCOUNTER
Patient called the office for me to e-mail his paperwork work to his email  I did send it to his email

## 2022-02-28 NOTE — TELEPHONE ENCOUNTER
Patient calling asking where exactly the paperwork is ready for  and if it can be emailed or sent via my chart to save him the trip? Please advise

## 2022-03-07 ENCOUNTER — TELEPHONE (OUTPATIENT)
Dept: GASTROENTEROLOGY | Facility: CLINIC | Age: 32
End: 2022-03-07

## 2022-03-07 NOTE — TELEPHONE ENCOUNTER
I called the patient to let him know that I do  have all the paperwork filled out by Dr Bonnie Mauro  He expressed understanding  He is going to come into the office to  the paperwork

## 2022-03-19 ENCOUNTER — APPOINTMENT (OUTPATIENT)
Dept: LAB | Facility: MEDICAL CENTER | Age: 32
End: 2022-03-19
Payer: COMMERCIAL

## 2022-03-19 DIAGNOSIS — E55.9 VITAMIN D DEFICIENCY: ICD-10-CM

## 2022-03-19 DIAGNOSIS — K51.90 ULCERATIVE COLITIS WITHOUT COMPLICATIONS, UNSPECIFIED LOCATION (HCC): ICD-10-CM

## 2022-03-19 DIAGNOSIS — D84.9 IMMUNOCOMPROMISED STATE (HCC): ICD-10-CM

## 2022-03-19 LAB
25(OH)D3 SERPL-MCNC: 16.1 NG/ML (ref 30–100)
ALBUMIN SERPL BCP-MCNC: 4.1 G/DL (ref 3.5–5)
ALP SERPL-CCNC: 90 U/L (ref 46–116)
ALT SERPL W P-5'-P-CCNC: 38 U/L (ref 12–78)
ANION GAP SERPL CALCULATED.3IONS-SCNC: 7 MMOL/L (ref 4–13)
AST SERPL W P-5'-P-CCNC: 25 U/L (ref 5–45)
BASOPHILS # BLD AUTO: 0.08 THOUSANDS/ΜL (ref 0–0.1)
BASOPHILS NFR BLD AUTO: 1 % (ref 0–1)
BILIRUB SERPL-MCNC: 0.5 MG/DL (ref 0.2–1)
BUN SERPL-MCNC: 14 MG/DL (ref 5–25)
CALCIUM SERPL-MCNC: 8.8 MG/DL (ref 8.3–10.1)
CHLORIDE SERPL-SCNC: 108 MMOL/L (ref 100–108)
CO2 SERPL-SCNC: 26 MMOL/L (ref 21–32)
CREAT SERPL-MCNC: 1.39 MG/DL (ref 0.6–1.3)
CRP SERPL QL: <3 MG/L
EOSINOPHIL # BLD AUTO: 0.52 THOUSAND/ΜL (ref 0–0.61)
EOSINOPHIL NFR BLD AUTO: 9 % (ref 0–6)
ERYTHROCYTE [DISTWIDTH] IN BLOOD BY AUTOMATED COUNT: 12.6 % (ref 11.6–15.1)
GFR SERPL CREATININE-BSD FRML MDRD: 67 ML/MIN/1.73SQ M
GLUCOSE P FAST SERPL-MCNC: 90 MG/DL (ref 65–99)
HBV CORE AB SER QL: NORMAL
HBV CORE IGM SER QL: NORMAL
HBV SURFACE AG SER QL: NORMAL
HCT VFR BLD AUTO: 47.4 % (ref 36.5–49.3)
HCV AB SER QL: NORMAL
HGB BLD-MCNC: 16.2 G/DL (ref 12–17)
IMM GRANULOCYTES # BLD AUTO: 0.02 THOUSAND/UL (ref 0–0.2)
IMM GRANULOCYTES NFR BLD AUTO: 0 % (ref 0–2)
LYMPHOCYTES # BLD AUTO: 1.96 THOUSANDS/ΜL (ref 0.6–4.47)
LYMPHOCYTES NFR BLD AUTO: 34 % (ref 14–44)
MCH RBC QN AUTO: 29.6 PG (ref 26.8–34.3)
MCHC RBC AUTO-ENTMCNC: 34.2 G/DL (ref 31.4–37.4)
MCV RBC AUTO: 87 FL (ref 82–98)
MONOCYTES # BLD AUTO: 0.5 THOUSAND/ΜL (ref 0.17–1.22)
MONOCYTES NFR BLD AUTO: 9 % (ref 4–12)
NEUTROPHILS # BLD AUTO: 2.64 THOUSANDS/ΜL (ref 1.85–7.62)
NEUTS SEG NFR BLD AUTO: 47 % (ref 43–75)
NRBC BLD AUTO-RTO: 0 /100 WBCS
PLATELET # BLD AUTO: 171 THOUSANDS/UL (ref 149–390)
PMV BLD AUTO: 11.4 FL (ref 8.9–12.7)
POTASSIUM SERPL-SCNC: 3.9 MMOL/L (ref 3.5–5.3)
PROT SERPL-MCNC: 8 G/DL (ref 6.4–8.2)
RBC # BLD AUTO: 5.47 MILLION/UL (ref 3.88–5.62)
SODIUM SERPL-SCNC: 141 MMOL/L (ref 136–145)
WBC # BLD AUTO: 5.72 THOUSAND/UL (ref 4.31–10.16)

## 2022-03-19 PROCEDURE — 87340 HEPATITIS B SURFACE AG IA: CPT

## 2022-03-19 PROCEDURE — 80145 DRUG ASSAY ADALIMUMAB: CPT

## 2022-03-19 PROCEDURE — 82306 VITAMIN D 25 HYDROXY: CPT

## 2022-03-19 PROCEDURE — 86140 C-REACTIVE PROTEIN: CPT

## 2022-03-19 PROCEDURE — 86704 HEP B CORE ANTIBODY TOTAL: CPT

## 2022-03-19 PROCEDURE — 80053 COMPREHEN METABOLIC PANEL: CPT

## 2022-03-19 PROCEDURE — 86803 HEPATITIS C AB TEST: CPT

## 2022-03-19 PROCEDURE — 86480 TB TEST CELL IMMUN MEASURE: CPT

## 2022-03-19 PROCEDURE — 82397 CHEMILUMINESCENT ASSAY: CPT

## 2022-03-19 PROCEDURE — 85025 COMPLETE CBC W/AUTO DIFF WBC: CPT

## 2022-03-19 PROCEDURE — 86705 HEP B CORE ANTIBODY IGM: CPT

## 2022-03-19 PROCEDURE — 36415 COLL VENOUS BLD VENIPUNCTURE: CPT

## 2022-03-22 LAB
GAMMA INTERFERON BACKGROUND BLD IA-ACNC: 0.04 IU/ML
M TB IFN-G BLD-IMP: NEGATIVE
M TB IFN-G CD4+ BCKGRND COR BLD-ACNC: 0 IU/ML
M TB IFN-G CD4+ BCKGRND COR BLD-ACNC: 0 IU/ML
MITOGEN IGNF BCKGRD COR BLD-ACNC: >10 IU/ML

## 2022-03-28 LAB
ADALIMUMAB AB SERPL-MCNC: <25 NG/ML
ADALIMUMAB SERPL-MCNC: 7.5 UG/ML

## 2022-06-13 ENCOUNTER — TELEPHONE (OUTPATIENT)
Dept: GASTROENTEROLOGY | Facility: CLINIC | Age: 32
End: 2022-06-13

## 2022-06-13 NOTE — TELEPHONE ENCOUNTER
He came into the office for  his FMLA paper work he was requesting more FMLA paperwork, pages were missing from previous paperwork  I  Had the original yet and sent Dr Paola Dubin a message and a emailed them to him to sign  I ask Dr Paola Dubin to e-mail them back to me  I explained to Kj Smith that I was going to have Dr Paola Dubin sign them  Kj Smith expressed understanding

## 2022-06-13 NOTE — TELEPHONE ENCOUNTER
Called patient to let him know  That I e-mailed him his FMLA papers  LMOM to contact the office  If he has any issues with them

## 2022-06-13 NOTE — TELEPHONE ENCOUNTER
Patient came into office requesting billing information for lab test on 3/19/22  Print out was given to patient of how much total was and how much he paid and was responsible for  He also stated his work was requesting more FMLA paperwork, pages were missing from previous paperwork

## 2022-06-14 NOTE — TELEPHONE ENCOUNTER
I called the patient 2 x to let him know I  did email him the [place] paperwork, and I have the originals in the office if he would like to pick them up in the office   LMOM

## 2022-08-02 ENCOUNTER — TELEPHONE (OUTPATIENT)
Dept: OTHER | Facility: OTHER | Age: 32
End: 2022-08-02

## 2022-08-02 NOTE — TELEPHONE ENCOUNTER
Patient contacted the office stating he is currently waiting for authorization for his Humira  He is 1 week past due and needs it ASAP as he is afraid of flair up  Please contact patient ASAP with updates

## 2022-08-02 NOTE — TELEPHONE ENCOUNTER
Called patient to update on situation and that there is a 72 hour turn around for [facility]  Pharmacy was originally sending medication but medication never came  Patient called and then they said it was an authorization information  He was due to take this on Friday last week  I offered a sample of Humira to  here at the office   Patient is aware that he needs to  medication by 4:30pm

## 2022-08-04 ENCOUNTER — TELEPHONE (OUTPATIENT)
Dept: OTHER | Facility: OTHER | Age: 32
End: 2022-08-04

## 2022-08-04 NOTE — TELEPHONE ENCOUNTER
Yanira Samaniego from a [facility] called saying this is in reference to a prior authorization that was sent over on 8/1 an is asking if the office can give her a call regarding this matter

## 2022-08-04 NOTE — TELEPHONE ENCOUNTER
Called insurance at [phone] and spoke with Darreld Can  He stated the authorization is pending but the decision date will be today       Pending case: [id number]

## 2022-08-05 NOTE — TELEPHONE ENCOUNTER
Called and spoke with Stefani  Verified that [address] [place] [address] was on file and test claim showed approved

## 2023-02-23 ENCOUNTER — TELEPHONE (OUTPATIENT)
Dept: GASTROENTEROLOGY | Facility: CLINIC | Age: 33
End: 2023-02-23

## 2023-02-23 DIAGNOSIS — K51.019 ULCERATIVE PANCOLITIS WITH COMPLICATION (HCC): ICD-10-CM

## 2023-02-23 RX ORDER — ADALIMUMAB 40MG/0.4ML
40 KIT SUBCUTANEOUS
Qty: 4 EACH | Refills: 6 | Status: SHIPPED | OUTPATIENT
Start: 2023-02-23

## 2023-02-23 NOTE — TELEPHONE ENCOUNTER
Emailed our contact at [address] [facility] to make him aware of situation and ask for this to be expedited

## 2023-02-23 NOTE — TELEPHONE ENCOUNTER
Patients GI provider:  Dr Reinaldo Romero    Number to return call: [phone]    Reason for call: Pt calling stating his Humira order   Needs this sent to [place] asap as he need refill before the weekend       Scheduled procedure/appointment date if applicable: no apts or procedures

## 2023-02-23 NOTE — TELEPHONE ENCOUNTER
Hi,    I sent it in  He is overdue for a follow-up  Can you set him up for a follow-up with me?     Thank you

## 2023-02-24 NOTE — TELEPHONE ENCOUNTER
Called and spoke with patient  He is very unhappy  I updated him on the situation and information I was provided listed below  He told me he called the office days ago and we just "dont answer the phone"  I advised that I only received a message yesterday (2/23) at 2:03pm and the script was sent at 2:40pm  He stated he already received a script once from [address] [facility] and our system is " f  up"  if we cannot see this  He than proceeded to tell me I should get IT involved to fix our system  I again apologized but I was going off of information I had in front of me and was doing everything I can to help  He continued to use vulgar language and say how much he hates us and [facility]  I did advise patient that if he continued to speak to me like that, I would hang up the phone  He proceeded to than speak in a different language  I informed him that I would follow up with [facility] Monday at 9am when they open  He proceeded to say " it better be fixed by Monday"  I again reiterated that this is nothing on my end preventing this from being sent it him  It is up to [facility]  He continued to repeat that he " f ing hates this"    Phone was disconnected

## 2023-02-24 NOTE — TELEPHONE ENCOUNTER
Received email from [address] [place] [address]    Patient's order is in its final stages for ship  [facility] 12[facility] -[facility]pm, and request a Saturday Delivery      Caryn Pleitez

## 2023-02-24 NOTE — TELEPHONE ENCOUNTER
I called patient and reached voicemail  I left detailed message on machine making him aware to call between noon and 2pm today  Provided phone number for [facility] as well   Instructed him to call office back if he has any concerns Unknown if ever smoked

## 2023-02-24 NOTE — TELEPHONE ENCOUNTER
Patient called office stating he cannot get his medication and is very unhappy  Called [facility] and spoke with Flor Levy  He stated it is a plan limitation  Pt can only fill a month at a time  They changed script and turn around time is 24-48 hours  I asked for an escalation, however they stated it already is         Length of call : 34 minutes

## 2023-02-27 NOTE — TELEPHONE ENCOUNTER
Connected with the patient via phone  I informed the patient that he is ready to call [facility] to [facility] up his shipment of Humira  We spoke on how to refill his Humira and the multiple ways that refills can be requested to avoid filling delays in the future (pharmacy can request, he can myChart, or he can call in)  I explained on every prescription label it should say how many refills are remaining  He was thankful for the assistance  I provided my phone number if he is to need assistance in the future

## 2023-06-22 ENCOUNTER — OFFICE VISIT (OUTPATIENT)
Dept: GASTROENTEROLOGY | Facility: CLINIC | Age: 33
End: 2023-06-22
Payer: COMMERCIAL

## 2023-06-22 VITALS
WEIGHT: 225 LBS | OXYGEN SATURATION: 97 % | HEART RATE: 73 BPM | HEIGHT: 73 IN | BODY MASS INDEX: 29.82 KG/M2 | TEMPERATURE: 96.7 F | SYSTOLIC BLOOD PRESSURE: 132 MMHG | DIASTOLIC BLOOD PRESSURE: 78 MMHG

## 2023-06-22 DIAGNOSIS — E53.8 VITAMIN B12 DEFICIENCY: ICD-10-CM

## 2023-06-22 DIAGNOSIS — D84.9 IMMUNOCOMPROMISED STATE (HCC): ICD-10-CM

## 2023-06-22 DIAGNOSIS — R10.9 ABDOMINAL PAIN, UNSPECIFIED ABDOMINAL LOCATION: ICD-10-CM

## 2023-06-22 DIAGNOSIS — R59.1 LYMPHADENOPATHY: ICD-10-CM

## 2023-06-22 DIAGNOSIS — G62.9 NEUROPATHY: ICD-10-CM

## 2023-06-22 DIAGNOSIS — E55.9 VITAMIN D DEFICIENCY: ICD-10-CM

## 2023-06-22 DIAGNOSIS — K51.90 ULCERATIVE COLITIS WITHOUT COMPLICATIONS, UNSPECIFIED LOCATION (HCC): Primary | ICD-10-CM

## 2023-06-22 PROCEDURE — 99215 OFFICE O/P EST HI 40 MIN: CPT | Performed by: INTERNAL MEDICINE

## 2023-06-22 RX ORDER — DICYCLOMINE HCL 20 MG
20 TABLET ORAL EVERY 6 HOURS PRN
Qty: 120 TABLET | Refills: 2 | Status: SHIPPED | OUTPATIENT
Start: 2023-06-22

## 2023-06-22 RX ORDER — POLYETHYLENE GLYCOL 3350, SODIUM SULFATE ANHYDROUS, SODIUM BICARBONATE, SODIUM CHLORIDE, POTASSIUM CHLORIDE 236; 22.74; 6.74; 5.86; 2.97 G/4L; G/4L; G/4L; G/4L; G/4L
4000 POWDER, FOR SOLUTION ORAL ONCE
Qty: 4000 ML | Refills: 0 | Status: SHIPPED | OUTPATIENT
Start: 2023-06-22 | End: 2023-06-22
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
## 2023-06-22 NOTE — PATIENT INSTRUCTIONS
Continue Humira every 2 weeks  Repeat Humira level right before the next injection  Next blood work ordered  Next colonoscopy due now  CT of the pelvis to look at the tailbone and lymph nodes  Avoid live virus vaccines  Yearly flu shot  COVID vaccine and booster  Pneumonia vaccine  Shingrix  Routine skin exams with the dermatologist  Referral to neurology

## 2023-06-28 ENCOUNTER — TELEPHONE (OUTPATIENT)
Dept: GASTROENTEROLOGY | Facility: CLINIC | Age: 33
End: 2023-06-28

## 2023-06-28 ENCOUNTER — TELEPHONE (OUTPATIENT)
Dept: SURGERY | Facility: CLINIC | Age: 33
End: 2023-06-28

## 2023-06-28 ENCOUNTER — TELEPHONE (OUTPATIENT)
Age: 33
End: 2023-06-28

## 2023-06-28 DIAGNOSIS — K51.90 ULCERATIVE COLITIS WITHOUT COMPLICATIONS, UNSPECIFIED LOCATION (HCC): ICD-10-CM

## 2023-06-28 DIAGNOSIS — R10.9 ABDOMINAL PAIN, UNSPECIFIED ABDOMINAL LOCATION: Primary | ICD-10-CM

## 2023-06-28 RX ORDER — DICYCLOMINE HCL 20 MG
20 TABLET ORAL EVERY 6 HOURS PRN
Qty: 120 TABLET | Refills: 2 | Status: SHIPPED | OUTPATIENT
Start: 2023-06-28

## 2023-06-28 RX ORDER — BISACODYL 5 MG/1
TABLET, DELAYED RELEASE ORAL
Qty: 2 TABLET | Refills: 0 | Status: SHIPPED | OUTPATIENT
Start: 2023-06-28

## 2023-06-28 RX ORDER — POLYETHYLENE GLYCOL 3350 17 G/17G
POWDER, FOR SOLUTION ORAL
Qty: 255 G | Refills: 0 | Status: SHIPPED | OUTPATIENT
Start: 2023-06-28

## 2023-06-29 ENCOUNTER — HOSPITAL ENCOUNTER (OUTPATIENT)
Dept: CT IMAGING | Facility: HOSPITAL | Age: 33
Discharge: HOME | End: 2023-06-29
Attending: INTERNAL MEDICINE
Payer: COMMERCIAL

## 2023-06-29 DIAGNOSIS — G62.9 NEUROPATHY: ICD-10-CM

## 2023-06-29 DIAGNOSIS — K51.90 ULCERATIVE COLITIS WITHOUT COMPLICATIONS, UNSPECIFIED LOCATION (HCC): ICD-10-CM

## 2023-06-29 DIAGNOSIS — R10.9 ABDOMINAL PAIN, UNSPECIFIED ABDOMINAL LOCATION: ICD-10-CM

## 2023-06-29 DIAGNOSIS — R59.1 LYMPHADENOPATHY: ICD-10-CM

## 2023-06-29 DIAGNOSIS — D84.9 IMMUNOCOMPROMISED STATE (HCC): ICD-10-CM

## 2023-06-29 PROCEDURE — G1004 CDSM NDSC: HCPCS

## 2023-06-29 PROCEDURE — 72193 CT PELVIS W/DYE: CPT

## 2023-06-29 RX ADMIN — IOHEXOL 100 ML: 350 INJECTION, SOLUTION INTRAVENOUS at 09:39

## 2023-07-05 ENCOUNTER — TELEPHONE (OUTPATIENT)
Dept: GASTROENTEROLOGY | Facility: CLINIC | Age: 33
End: 2023-07-05

## 2023-07-26 RX ORDER — SODIUM CHLORIDE, SODIUM LACTATE, POTASSIUM CHLORIDE, CALCIUM CHLORIDE 600; 310; 30; 20 MG/100ML; MG/100ML; MG/100ML; MG/100ML
125 INJECTION, SOLUTION INTRAVENOUS CONTINUOUS
Status: CANCELLED | OUTPATIENT
Start: 2023-07-26

## 2023-07-27 ENCOUNTER — NURSE TRIAGE (OUTPATIENT)
Dept: OTHER | Facility: OTHER | Age: 33
End: 2023-07-27

## 2023-07-28 ENCOUNTER — ANESTHESIA EVENT (OUTPATIENT)
Dept: PERIOP | Facility: HOSPITAL | Age: 33
End: 2023-07-28

## 2023-07-28 ENCOUNTER — HOSPITAL ENCOUNTER (OUTPATIENT)
Dept: PERIOP | Facility: HOSPITAL | Age: 33
Setting detail: OUTPATIENT SURGERY
End: 2023-07-28
Attending: INTERNAL MEDICINE
Payer: COMMERCIAL

## 2023-07-28 ENCOUNTER — ANESTHESIA (OUTPATIENT)
Dept: PERIOP | Facility: HOSPITAL | Age: 33
End: 2023-07-28

## 2023-07-28 VITALS
SYSTOLIC BLOOD PRESSURE: 118 MMHG | RESPIRATION RATE: 20 BRPM | TEMPERATURE: 97 F | BODY MASS INDEX: 29.82 KG/M2 | DIASTOLIC BLOOD PRESSURE: 80 MMHG | HEART RATE: 72 BPM | OXYGEN SATURATION: 97 % | HEIGHT: 73 IN | WEIGHT: 225 LBS

## 2023-07-28 DIAGNOSIS — K51.90 ULCERATIVE COLITIS WITHOUT COMPLICATIONS, UNSPECIFIED LOCATION (HCC): ICD-10-CM

## 2023-07-28 PROCEDURE — 88305 TISSUE EXAM BY PATHOLOGIST: CPT | Performed by: PATHOLOGY

## 2023-07-28 PROCEDURE — 45380 COLONOSCOPY AND BIOPSY: CPT | Performed by: INTERNAL MEDICINE

## 2023-07-28 RX ORDER — SODIUM CHLORIDE, SODIUM LACTATE, POTASSIUM CHLORIDE, CALCIUM CHLORIDE 600; 310; 30; 20 MG/100ML; MG/100ML; MG/100ML; MG/100ML
125 INJECTION, SOLUTION INTRAVENOUS CONTINUOUS
Status: DISCONTINUED | OUTPATIENT
Start: 2023-07-28 | End: 2023-08-01 | Stop reason: HOSPADM

## 2023-07-28 RX ORDER — PROPOFOL 10 MG/ML
INJECTION, EMULSION INTRAVENOUS CONTINUOUS PRN
Status: DISCONTINUED | OUTPATIENT
Start: 2023-07-28 | End: 2023-07-28

## 2023-07-28 RX ORDER — PROPOFOL 10 MG/ML
INJECTION, EMULSION INTRAVENOUS AS NEEDED
Status: DISCONTINUED | OUTPATIENT
Start: 2023-07-28 | End: 2023-07-28

## 2023-07-28 RX ORDER — LIDOCAINE HYDROCHLORIDE 20 MG/ML
INJECTION, SOLUTION EPIDURAL; INFILTRATION; INTRACAUDAL; PERINEURAL AS NEEDED
Status: DISCONTINUED | OUTPATIENT
Start: 2023-07-28 | End: 2023-07-28

## 2023-07-28 RX ADMIN — LIDOCAINE HYDROCHLORIDE 100 MG: 20 INJECTION, SOLUTION EPIDURAL; INFILTRATION; INTRACAUDAL; PERINEURAL at 11:43

## 2023-07-28 RX ADMIN — PROPOFOL 100 MCG/KG/MIN: 10 INJECTION, EMULSION INTRAVENOUS at 11:45

## 2023-07-28 RX ADMIN — PROPOFOL 50 MG: 10 INJECTION, EMULSION INTRAVENOUS at 11:44

## 2023-07-28 RX ADMIN — SODIUM CHLORIDE, SODIUM LACTATE, POTASSIUM CHLORIDE, AND CALCIUM CHLORIDE 125 ML/HR: .6; .31; .03; .02 INJECTION, SOLUTION INTRAVENOUS at 11:27

## 2023-07-28 RX ADMIN — PROPOFOL 50 MG: 10 INJECTION, EMULSION INTRAVENOUS at 11:45

## 2023-07-28 RX ADMIN — PROPOFOL 150 MG: 10 INJECTION, EMULSION INTRAVENOUS at 11:43

## 2023-07-28 RX ADMIN — PROPOFOL 50 MG: 10 INJECTION, EMULSION INTRAVENOUS at 11:46

## 2023-07-28 NOTE — H&P
History and Physical -  [facility]  Miracle Humphreys 34 y.o. male MRN: [id number]                  HPI: Miracle Humphreys is a 34y.o. year old male who presents for UC      REVIEW OF SYSTEMS: Per the HPI, and otherwise unremarkable. Historical Information   Past Medical History:   Diagnosis Date   • Rhabdomyolysis 2011   • Ulcerative colitis ([address])      Past Surgical History:   Procedure Laterality Date   • COLONOSCOPY     • WISDOM TOOTH EXTRACTION       Social History   Social History     Substance and Sexual Activity   Alcohol Use Not Currently     Social History     Substance and Sexual Activity   Drug Use Never     Social History     Tobacco Use   Smoking Status Never   Smokeless Tobacco Never     Family History   Problem Relation Age of Onset   • No Known Problems Mother    • No Known Problems Father    • Diabetes Paternal Grandfather        Meds/Allergies     (Not in a hospital admission)      Allergies   Allergen Reactions   • Dilaudid [Hydromorphone]      Chest pain/pressure       Objective     There were no vitals taken for this visit. PHYSICAL EXAMINATION:    General Appearance:   Alert, cooperative, no distress   HEENT:  Normocephalic, atraumatic, anicteric. Neck supple, symmetrical, trachea midline. Lungs:   Equal chest rise and unlabored breathing, normal effort, no coughing. Cardiovascular:   No visualized JVD. Abdomen:   No abdominal distension. Skin:   No jaundice, rashes, or lesions. Musculoskeletal:   Normal range of motion visualized. Psych:  Normal affect and normal insight. Neuro:  Alert and appropriate. ASSESSMENT/PLAN:  This is a 34y.o. year old male here for colonoscopy, and he is stable and optimized for his procedure.

## 2023-07-28 NOTE — ANESTHESIA POSTPROCEDURE EVALUATION
Post-Op Assessment Note    CV Status:  Stable  Pain Score: 0    Pain management: adequate     Mental Status:  Sleepy and arousable   Hydration Status:  Euvolemic   PONV Controlled:  Controlled   Airway Patency:  Patent      Post Op Vitals Reviewed: Yes      Staff: Anesthesiologist, CRNA         No notable events documented.     /57 (07/28/23 1207)    Temp 97.7 °F (36.5 °C) (07/28/23 1207)    Pulse 76 (07/28/23 1207)   Resp 20 (07/28/23 1207)    SpO2 96 % (07/28/23 1207)

## 2023-07-28 NOTE — TELEPHONE ENCOUNTER
Regarding: Question regarding colonoscopy prep  ----- Message from Omari Pruett sent at 7/27/2023  7:32 PM EDT -----  '' I'm schedule to have a colonoscopy and I have a question regarding the colonoscopy prep.''

## 2023-07-28 NOTE — ANESTHESIA PREPROCEDURE EVALUATION
Procedure:  COLONOSCOPY    Relevant Problems   /RENAL   (+) OMA (acute kidney injury) ([address])      HEMATOLOGY   (+) Immunocompromised state ([address])      MUSCULOSKELETAL   (+) Chronic right-sided low back pain with right-sided sciatica      NEURO/PSYCH   (+) Chronic right-sided low back pain with right-sided sciatica        Physical Exam    Airway    Mallampati score: II  TM Distance: >3 FB  Neck ROM: full     Dental   No notable dental hx     Cardiovascular      Pulmonary      Other Findings        Anesthesia Plan  ASA Score- 2     Anesthesia Type- IV sedation with anesthesia with ASA Monitors. Additional Monitors:   Airway Plan:           Plan Factors-Exercise tolerance (METS): >4 METS. Chart reviewed. Patient summary reviewed. Patient is not a current smoker. Patient did not smoke on day of surgery. Induction- intravenous. Postoperative Plan-     Informed Consent- Anesthetic plan and risks discussed with patient. I personally reviewed this patient with the CRNA. Discussed and agreed on the Anesthesia Plan with the CRNA. Chin Stoddard

## 2023-07-28 NOTE — TELEPHONE ENCOUNTER
Reason for Disposition  • Caller has medicine question only, adult not sick, AND triager answers question    Answer Assessment - Initial Assessment Questions  1. NAME of MEDICATION: "What medicine are you calling about?"      Miralax   2. QUESTION: "What is your question?" (e.g., medication refill, side effect)      What size bottle should it be. And how much to mix it in? 3. PRESCRIBING HCP: "Who prescribed it?" Reason: if prescribed by specialist, call should be referred to that group. [facility]  4. SYMPTOMS: "Do you have any symptoms?"        5. SEVERITY: If symptoms are present, ask "Are they mild, moderate or severe?"      Colonoscopy tomorrow.     Protocols used: MEDICATION QUESTION CALL-ADULT-

## 2023-08-01 PROCEDURE — 88305 TISSUE EXAM BY PATHOLOGIST: CPT | Performed by: PATHOLOGY

## 2023-08-28 ENCOUNTER — APPOINTMENT (OUTPATIENT)
Dept: LAB | Facility: MEDICAL CENTER | Age: 33
End: 2023-08-28
Payer: COMMERCIAL

## 2023-08-28 DIAGNOSIS — R59.1 LYMPHADENOPATHY: ICD-10-CM

## 2023-08-28 DIAGNOSIS — E55.9 VITAMIN D DEFICIENCY: ICD-10-CM

## 2023-08-28 DIAGNOSIS — G62.9 NEUROPATHY: ICD-10-CM

## 2023-08-28 DIAGNOSIS — E53.8 VITAMIN B12 DEFICIENCY: ICD-10-CM

## 2023-08-28 DIAGNOSIS — K51.90 ULCERATIVE COLITIS WITHOUT COMPLICATIONS, UNSPECIFIED LOCATION (HCC): ICD-10-CM

## 2023-08-28 DIAGNOSIS — D84.9 IMMUNOCOMPROMISED STATE (HCC): ICD-10-CM

## 2023-08-28 DIAGNOSIS — R10.9 ABDOMINAL PAIN, UNSPECIFIED ABDOMINAL LOCATION: ICD-10-CM

## 2023-08-28 LAB
25(OH)D3 SERPL-MCNC: 16.8 NG/ML (ref 30–100)
BASOPHILS # BLD AUTO: 0.1 THOUSANDS/ÂΜL (ref 0–0.1)
BASOPHILS NFR BLD AUTO: 2 % (ref 0–1)
EOSINOPHIL # BLD AUTO: 0.52 THOUSAND/ÂΜL (ref 0–0.61)
EOSINOPHIL NFR BLD AUTO: 8 % (ref 0–6)
ERYTHROCYTE [DISTWIDTH] IN BLOOD BY AUTOMATED COUNT: 12.6 % (ref 11.6–15.1)
HCT VFR BLD AUTO: 47.2 % (ref 36.5–49.3)
HGB BLD-MCNC: 16.3 G/DL (ref 12–17)
IMM GRANULOCYTES # BLD AUTO: 0.01 THOUSAND/UL (ref 0–0.2)
IMM GRANULOCYTES NFR BLD AUTO: 0 % (ref 0–2)
LYMPHOCYTES # BLD AUTO: 2.34 THOUSANDS/ÂΜL (ref 0.6–4.47)
LYMPHOCYTES NFR BLD AUTO: 37 % (ref 14–44)
MCH RBC QN AUTO: 30 PG (ref 26.8–34.3)
MCHC RBC AUTO-ENTMCNC: 34.5 G/DL (ref 31.4–37.4)
MCV RBC AUTO: 87 FL (ref 82–98)
MONOCYTES # BLD AUTO: 0.58 THOUSAND/ÂΜL (ref 0.17–1.22)
MONOCYTES NFR BLD AUTO: 9 % (ref 4–12)
NEUTROPHILS # BLD AUTO: 2.83 THOUSANDS/ÂΜL (ref 1.85–7.62)
NEUTS SEG NFR BLD AUTO: 44 % (ref 43–75)
NRBC BLD AUTO-RTO: 0 /100 WBCS
PLATELET # BLD AUTO: 266 THOUSANDS/UL (ref 149–390)
PMV BLD AUTO: 11.3 FL (ref 8.9–12.7)
RBC # BLD AUTO: 5.43 MILLION/UL (ref 3.88–5.62)
WBC # BLD AUTO: 6.38 THOUSAND/UL (ref 4.31–10.16)

## 2023-08-28 PROCEDURE — 80053 COMPREHEN METABOLIC PANEL: CPT

## 2023-08-28 PROCEDURE — 82728 ASSAY OF FERRITIN: CPT

## 2023-08-28 PROCEDURE — 82306 VITAMIN D 25 HYDROXY: CPT

## 2023-08-28 PROCEDURE — 82607 VITAMIN B-12: CPT

## 2023-08-28 PROCEDURE — 36415 COLL VENOUS BLD VENIPUNCTURE: CPT

## 2023-08-28 PROCEDURE — 86140 C-REACTIVE PROTEIN: CPT

## 2023-08-28 PROCEDURE — 86704 HEP B CORE ANTIBODY TOTAL: CPT

## 2023-08-28 PROCEDURE — 82397 CHEMILUMINESCENT ASSAY: CPT

## 2023-08-28 PROCEDURE — 86705 HEP B CORE ANTIBODY IGM: CPT

## 2023-08-28 PROCEDURE — 85025 COMPLETE CBC W/AUTO DIFF WBC: CPT

## 2023-08-28 PROCEDURE — 86803 HEPATITIS C AB TEST: CPT

## 2023-08-28 PROCEDURE — 83540 ASSAY OF IRON: CPT

## 2023-08-28 PROCEDURE — 83550 IRON BINDING TEST: CPT

## 2023-08-28 PROCEDURE — 86480 TB TEST CELL IMMUN MEASURE: CPT

## 2023-08-28 PROCEDURE — 80145 DRUG ASSAY ADALIMUMAB: CPT

## 2023-08-28 PROCEDURE — 87340 HEPATITIS B SURFACE AG IA: CPT

## 2023-08-29 LAB
ALBUMIN SERPL BCP-MCNC: 4.5 G/DL (ref 3.5–5)
ALP SERPL-CCNC: 66 U/L (ref 34–104)
ALT SERPL W P-5'-P-CCNC: 19 U/L (ref 7–52)
ANION GAP SERPL CALCULATED.3IONS-SCNC: 13 MMOL/L
AST SERPL W P-5'-P-CCNC: 21 U/L (ref 13–39)
BILIRUB SERPL-MCNC: 0.42 MG/DL (ref 0.2–1)
BUN SERPL-MCNC: 20 MG/DL (ref 5–25)
CALCIUM SERPL-MCNC: 9.4 MG/DL (ref 8.4–10.2)
CHLORIDE SERPL-SCNC: 104 MMOL/L (ref 96–108)
CO2 SERPL-SCNC: 25 MMOL/L (ref 21–32)
CREAT SERPL-MCNC: 1.5 MG/DL (ref 0.6–1.3)
CRP SERPL QL: <1 MG/L
FERRITIN SERPL-MCNC: 53 NG/ML (ref 24–336)
GFR SERPL CREATININE-BSD FRML MDRD: 61 ML/MIN/1.73SQ M
GLUCOSE P FAST SERPL-MCNC: 76 MG/DL (ref 65–99)
HBV CORE AB SER QL: NORMAL
HBV CORE IGM SER QL: NORMAL
HBV SURFACE AG SER QL: NORMAL
HCV AB SER QL: NORMAL
IRON SATN MFR SERPL: 21 % (ref 15–50)
IRON SERPL-MCNC: 64 UG/DL (ref 50–212)
POTASSIUM SERPL-SCNC: 3.8 MMOL/L (ref 3.5–5.3)
PROT SERPL-MCNC: 7.7 G/DL (ref 6.4–8.4)
SODIUM SERPL-SCNC: 142 MMOL/L (ref 135–147)
TIBC SERPL-MCNC: 312 UG/DL (ref 250–450)
UIBC SERPL-MCNC: 248 UG/DL (ref 155–355)
VIT B12 SERPL-MCNC: 290 PG/ML (ref 180–914)

## 2023-08-30 LAB
GAMMA INTERFERON BACKGROUND BLD IA-ACNC: 0.02 IU/ML
M TB IFN-G BLD-IMP: NEGATIVE
M TB IFN-G CD4+ BCKGRND COR BLD-ACNC: 0.02 IU/ML
M TB IFN-G CD4+ BCKGRND COR BLD-ACNC: 0.02 IU/ML
MITOGEN IGNF BCKGRD COR BLD-ACNC: 8.68 IU/ML

## 2023-09-05 LAB
ADALIMUMAB AB SERPL-MCNC: <25 NG/ML
ADALIMUMAB SERPL-MCNC: 8 UG/ML

## 2024-02-12 DIAGNOSIS — K51.019 ULCERATIVE PANCOLITIS WITH COMPLICATION (HCC): ICD-10-CM

## 2024-02-13 RX ORDER — ADALIMUMAB 40MG/0.4ML
KIT SUBCUTANEOUS
Qty: 2 EACH | Refills: 3 | Status: SHIPPED | OUTPATIENT
Start: 2024-02-13

## 2024-04-18 ENCOUNTER — OFFICE VISIT (OUTPATIENT)
Dept: GASTROENTEROLOGY | Facility: CLINIC | Age: 34
End: 2024-04-18
Payer: COMMERCIAL

## 2024-04-18 VITALS
BODY MASS INDEX: 29.55 KG/M2 | WEIGHT: 223 LBS | TEMPERATURE: 97.5 F | OXYGEN SATURATION: 97 % | DIASTOLIC BLOOD PRESSURE: 78 MMHG | HEIGHT: 73 IN | HEART RATE: 69 BPM | SYSTOLIC BLOOD PRESSURE: 134 MMHG

## 2024-04-18 DIAGNOSIS — R10.30 LOWER ABDOMINAL PAIN: ICD-10-CM

## 2024-04-18 DIAGNOSIS — G62.9 NEUROPATHY: ICD-10-CM

## 2024-04-18 DIAGNOSIS — D84.9 IMMUNOCOMPROMISED STATE (HCC): ICD-10-CM

## 2024-04-18 DIAGNOSIS — K51.90 ULCERATIVE COLITIS WITHOUT COMPLICATIONS, UNSPECIFIED LOCATION (HCC): Primary | ICD-10-CM

## 2024-04-18 PROCEDURE — 99214 OFFICE O/P EST MOD 30 MIN: CPT | Performed by: DIETITIAN, REGISTERED

## 2024-04-18 RX ORDER — DICYCLOMINE HYDROCHLORIDE 10 MG/1
10 CAPSULE ORAL 2 TIMES DAILY PRN
Qty: 60 CAPSULE | Refills: 3 | Status: SHIPPED | OUTPATIENT
Start: 2024-04-18

## 2024-04-18 NOTE — Clinical Note
Bruno Ortega, Patient has noticed worsening GI symptoms towards the end of his 2-week cycle with Humira, so I am checking his drug/antibody levels.  Patient would be amenable to once weekly dosing if this were to be indicated, but I wanted to make you aware/get your input if you think this might be helpful for him.  Thanks!

## 2024-04-18 NOTE — PROGRESS NOTES
[facility] - Outpatient Follow-up Note  Robin Hector 34 y.o. male MRN: [id number]  Encounter: [id number]          ASSESSMENT AND PLAN:    1.  Ulcerative colitis  2.  Immunocompromise state   3.  Lower abdominal pain  4.  Neuropathy  Patient reports he had an episode of the flu about 2 weeks ago, then about 1 week ago, he woke up after sleeping poorly, ate a meal, and had onset of nausea, had a bowel movement, and then had significant lightheadedness/presyncope.  Did not have to strain with bowel movement.  Symptoms eventually slowly improved within about 30 to 40 minutes but had significant fatigue after this episode.  Patient has had 1 other similar episode in the past but symptoms were less severe at that time.  He also reports he had a flare of UC symptoms about 1 month ago that lasted about 2 weeks with frequent postprandial/urgent bowel movements and 1-2 episodes of red-tinged mucus in the stool.  Overall, he typically has 2-3 BMs per day which are formed.  Sometimes has episodes of mild constipation with only 1 BM per day, followed by more frequent bowel movements in the following days.  Overall denies any blood in the stool or abdominal pain, but intermittently has LLQ or RLQ pain when feeling constipated.  He continues to have a small rash on his inner thigh, which seems a bit more prominent recently.  No mouth sores.  He does endorse joint pains in his hands, feet, knees, and back.  Patient works as a wood worker.  He notices some indigestion/nausea in the early morning whenever he does not sleep well.  He continues on Humira every 2 weeks and notices that GI symptoms tend to worsen within the last few days when he is due for his next dose.  Colonoscopy 7/2023 normal other than multiple pseudopolyps, recommended for repeat in 3 years.  Labs 8/2023 reviewed.  CRP normal.  Humira level 8 with undetectable antibodies.  Iron panel normal.  Vitamin B12 290.  Vitamin D  16.8.    -Recheck CBC, CMP, CRP, hemoglobin A1c, vitamin D, vitamin B12, iron, and Humira drug/antibody levels.  -Consider increasing Humira dosing to once weekly rather than every 2 weeks given patient reports symptoms worsening by the end of his 2-week cycle.  -Placed referrals to dermatology and neurology per prior recommendation, but patient has been unable to schedule just yet.  -Recommend patient discuss his episodes of dizziness with his PCP in addition to our workup above.  -Start dicyclomine 10 mg twice daily as needed for intermittent lower abdominal pain.    Follow up 3 months.    __________________________________________________________    SUBJECTIVE:  Robin Hector is a 34-year-old male with history of ulcerative colitis who presents for evaluation of nausea.  Last office visit 6/2023.  Colonoscopy 7/2023 normal other than multiple pseudopolyps, recommended for repeat in 3 years.  Labs 8/2023 reviewed.  CRP normal.  Humira level 8 with undetectable antibodies.  Iron panel normal.  Vitamin B12 290.  Vitamin D 16.8.    Patient reports he had an episode of the flu about 2 weeks ago, then about 1 week ago, he woke up after sleeping poorly, ate a meal, and had onset of nausea, had a bowel movement, and then had significant lightheadedness/presyncope.  Did not have to strain with bowel movement.  Symptoms eventually slowly improved within about 30 to 40 minutes but had significant fatigue after this episode.  Patient has had 1 other similar episode in the past but symptoms were less severe at that time.  He also reports he had a flare of UC symptoms about 1 month ago that lasted about 2 weeks with frequent postprandial/urgent bowel movements and 1-2 episodes of red-tinged mucus in the stool.  Overall, he typically has 2-3 BMs per day which are formed.  Sometimes has episodes of mild constipation with only 1 BM per day, followed by more frequent bowel movements in the following days.  Overall denies any  blood in the stool or abdominal pain, but intermittently has LLQ or RLQ pain when feeling constipated.  He continues to have a small rash on his inner thigh, which seems a bit more prominent recently.  No mouth sores.  He does endorse joint pains in his hands, feet, knees, and back.  Patient works as a wood worker.  He notices some indigestion/nausea in the early morning whenever he does not sleep well.  He continues on Humira every 2 weeks and notices that GI symptoms tend to worsen within the last few days when he is due for his next dose.        REVIEW OF SYSTEMS:  10 point ROS reviewed and negative, except as above      Historical Information   Past Medical History:   Diagnosis Date    Rhabdomyolysis 2011    Ulcerative colitis (HCC)      Past Surgical History:   Procedure Laterality Date    COLONOSCOPY      WISDOM TOOTH EXTRACTION       Social History   Social History     Substance and Sexual Activity   Alcohol Use Not Currently     Social History     Substance and Sexual Activity   Drug Use Never     Social History     Tobacco Use   Smoking Status Never   Smokeless Tobacco Never     Family History   Problem Relation Age of Onset    No Known Problems Mother     No Known Problems Father     Diabetes Paternal Grandfather        Meds/Allergies       Current Outpatient Medications:     Humira, 2 Pen, 40 MG/0.4ML PNKT    bisacodyl (DULCOLAX) 5 mg EC tablet    dicyclomine (BENTYL) 20 mg tablet    dicyclomine (BENTYL) 20 mg tablet    polyethylene glycol (GLYCOLAX) 17 GM/SCOOP powder    Allergies   Allergen Reactions    Dilaudid [Hydromorphone]      Chest pain/pressure           Objective     Wt Readings from Last 3 Encounters:   04/18/24 101 kg (223 lb)   07/28/23 102 kg (225 lb)   06/22/23 102 kg (225 lb)     Temp Readings from Last 3 Encounters:   04/18/24 97.5 °F (36.4 °C) (Tympanic)   07/28/23 (!) 97 °F (36.1 °C) (Tympanic)   06/22/23 (!) 96.7 °F (35.9 °C) (Tympanic)     BP Readings from Last 3 Encounters:    04/18/24 134/78   07/28/23 118/80   06/22/23 132/78     Pulse Readings from Last 3 Encounters:   04/18/24 69   07/28/23 72   06/22/23 73          PHYSICAL EXAM:      Physical Exam  Vitals reviewed.   Constitutional:       General: He is not in acute distress.     Appearance: He is well-developed.   HENT:      Head: Normocephalic and atraumatic.   Eyes:      Conjunctiva/sclera: Conjunctivae normal.   Cardiovascular:      Rate and Rhythm: Normal rate and regular rhythm.      Heart sounds: No murmur heard.  Pulmonary:      Effort: Pulmonary effort is normal. No respiratory distress.      Breath sounds: Normal breath sounds.   Abdominal:      General: Bowel sounds are normal. There is no distension.      Palpations: Abdomen is soft.      Tenderness: There is no abdominal tenderness.   Musculoskeletal:         General: No swelling.      Cervical back: Neck supple.   Skin:     General: Skin is warm and dry.   Neurological:      Mental Status: He is alert.   Psychiatric:         Mood and Affect: Mood normal.          Lab Results:   No visits with results within 1 Day(s) from this visit.   Latest known visit with results is:   Appointment on 08/28/2023   Component Date Value    WBC 08/28/2023 6.38     RBC 08/28/2023 5.43     Hemoglobin 08/28/2023 16.3     Hematocrit 08/28/2023 47.2     MCV 08/28/2023 87     MCH 08/28/2023 30.0     MCHC 08/28/2023 34.5     RDW 08/28/2023 12.6     MPV 08/28/2023 11.3     Platelets 08/28/2023 266     nRBC 08/28/2023 0     Segmented % 08/28/2023 44     Immature Grans % 08/28/2023 0     Lymphocytes % 08/28/2023 37     Monocytes % 08/28/2023 9     Eosinophils Relative 08/28/2023 8 (H)     Basophils Relative 08/28/2023 2 (H)     Absolute Neutrophils 08/28/2023 2.83     Absolute Immature Grans 08/28/2023 0.01     Absolute Lymphocytes 08/28/2023 2.34     Absolute Monocytes 08/28/2023 0.58     Eosinophils Absolute 08/28/2023 0.52     Basophils Absolute 08/28/2023 0.10     Sodium 08/28/2023 142      Potassium 08/28/2023 3.8     Chloride 08/28/2023 104     CO2 08/28/2023 25     ANION GAP 08/28/2023 13     BUN 08/28/2023 20     Creatinine 08/28/2023 1.50 (H)     Glucose, Fasting 08/28/2023 76     Calcium 08/28/2023 9.4     AST 08/28/2023 21     ALT 08/28/2023 19     Alkaline Phosphatase 08/28/2023 66     Total Protein 08/28/2023 7.7     Albumin 08/28/2023 4.5     Total Bilirubin 08/28/2023 0.42     eGFR 08/28/2023 61     CRP 08/28/2023 <1.0     ADALIMUMAB DRUG LEVEL 08/28/2023 8.0     ANTI-ADALIMUMAB ANTIBODY 08/28/2023 <25     Hepatitis B Surface Ag 08/28/2023 Non-reactive     Hepatitis C Ab 08/28/2023 Non-reactive     Hep B C IgM 08/28/2023 Non-reactive     Hep B Core Total Ab 08/28/2023 Non-reactive     QFT Nil 08/28/2023 0.02     QFT TB1-NIL 08/28/2023 0.02     QFT TB2-NIL 08/28/2023 0.02     QFT Mitogen-NIL 08/28/2023 8.68     QFT Final Interpretation 08/28/2023 Negative     Vit D, 25-Hydroxy 08/28/2023 16.8 (L)     Vitamin B-12 08/28/2023 290     Iron Saturation 08/28/2023 21     TIBC 08/28/2023 312     Iron 08/28/2023 64     UIBC 08/28/2023 248     Ferritin 08/28/2023 53        Lab Results   Component Value Date    WBC 6.38 08/28/2023    HGB 16.3 08/28/2023    HCT 47.2 08/28/2023    MCV 87 08/28/2023     08/28/2023       Lab Results   Component Value Date     03/31/2015    SODIUM 142 08/28/2023    K 3.8 08/28/2023     08/28/2023    CO2 25 08/28/2023    ANIONGAP 9 03/31/2015    AGAP 13 08/28/2023    BUN 20 08/28/2023    CREATININE 1.50 (H) 08/28/2023    GLUC 87 03/02/2021    GLUF 76 08/28/2023    CALCIUM 9.4 08/28/2023    AST 21 08/28/2023    ALT 19 08/28/2023    ALKPHOS 66 08/28/2023    PROT 7.4 03/31/2015    TP 7.7 08/28/2023    BILITOT 0.6 03/31/2015    TBILI 0.42 08/28/2023    EGFR 61 08/28/2023         Radiology Results:   No results found.

## 2024-04-20 ENCOUNTER — APPOINTMENT (OUTPATIENT)
Dept: LAB | Facility: MEDICAL CENTER | Age: 34
End: 2024-04-20
Payer: COMMERCIAL

## 2024-04-20 DIAGNOSIS — D84.9 IMMUNOCOMPROMISED STATE (HCC): ICD-10-CM

## 2024-04-20 DIAGNOSIS — K51.90 ULCERATIVE COLITIS WITHOUT COMPLICATIONS, UNSPECIFIED LOCATION (HCC): ICD-10-CM

## 2024-04-20 LAB
25(OH)D3 SERPL-MCNC: 14.9 NG/ML (ref 30–100)
ALBUMIN SERPL BCP-MCNC: 4.6 G/DL (ref 3.5–5)
ALP SERPL-CCNC: 63 U/L (ref 34–104)
ALT SERPL W P-5'-P-CCNC: 16 U/L (ref 7–52)
ANION GAP SERPL CALCULATED.3IONS-SCNC: 7 MMOL/L (ref 4–13)
AST SERPL W P-5'-P-CCNC: 19 U/L (ref 13–39)
BASOPHILS # BLD AUTO: 0.09 THOUSANDS/ÂΜL (ref 0–0.1)
BASOPHILS NFR BLD AUTO: 2 % (ref 0–1)
BILIRUB SERPL-MCNC: 0.57 MG/DL (ref 0.2–1)
BUN SERPL-MCNC: 14 MG/DL (ref 5–25)
CALCIUM SERPL-MCNC: 9.6 MG/DL (ref 8.4–10.2)
CHLORIDE SERPL-SCNC: 103 MMOL/L (ref 96–108)
CO2 SERPL-SCNC: 33 MMOL/L (ref 21–32)
CREAT SERPL-MCNC: 1.31 MG/DL (ref 0.6–1.3)
CRP SERPL QL: <1 MG/L
EOSINOPHIL # BLD AUTO: 0.34 THOUSAND/ÂΜL (ref 0–0.61)
EOSINOPHIL NFR BLD AUTO: 6 % (ref 0–6)
ERYTHROCYTE [DISTWIDTH] IN BLOOD BY AUTOMATED COUNT: 12.5 % (ref 11.6–15.1)
EST. AVERAGE GLUCOSE BLD GHB EST-MCNC: 108 MG/DL
FERRITIN SERPL-MCNC: 74 NG/ML (ref 24–336)
GFR SERPL CREATININE-BSD FRML MDRD: 71 ML/MIN/1.73SQ M
GLUCOSE P FAST SERPL-MCNC: 97 MG/DL (ref 65–99)
HBA1C MFR BLD: 5.4 %
HCT VFR BLD AUTO: 49 % (ref 36.5–49.3)
HGB BLD-MCNC: 16.2 G/DL (ref 12–17)
IMM GRANULOCYTES # BLD AUTO: 0.01 THOUSAND/UL (ref 0–0.2)
IMM GRANULOCYTES NFR BLD AUTO: 0 % (ref 0–2)
IRON SATN MFR SERPL: 32 % (ref 15–50)
IRON SERPL-MCNC: 111 UG/DL (ref 50–212)
LYMPHOCYTES # BLD AUTO: 1.93 THOUSANDS/ÂΜL (ref 0.6–4.47)
LYMPHOCYTES NFR BLD AUTO: 34 % (ref 14–44)
MCH RBC QN AUTO: 29.3 PG (ref 26.8–34.3)
MCHC RBC AUTO-ENTMCNC: 33.1 G/DL (ref 31.4–37.4)
MCV RBC AUTO: 89 FL (ref 82–98)
MONOCYTES # BLD AUTO: 0.52 THOUSAND/ÂΜL (ref 0.17–1.22)
MONOCYTES NFR BLD AUTO: 9 % (ref 4–12)
NEUTROPHILS # BLD AUTO: 2.74 THOUSANDS/ÂΜL (ref 1.85–7.62)
NEUTS SEG NFR BLD AUTO: 49 % (ref 43–75)
NRBC BLD AUTO-RTO: 0 /100 WBCS
PLATELET # BLD AUTO: 261 THOUSANDS/UL (ref 149–390)
PMV BLD AUTO: 11.1 FL (ref 8.9–12.7)
POTASSIUM SERPL-SCNC: 4.1 MMOL/L (ref 3.5–5.3)
PROT SERPL-MCNC: 8 G/DL (ref 6.4–8.4)
RBC # BLD AUTO: 5.52 MILLION/UL (ref 3.88–5.62)
SODIUM SERPL-SCNC: 143 MMOL/L (ref 135–147)
TIBC SERPL-MCNC: 342 UG/DL (ref 250–450)
UIBC SERPL-MCNC: 231 UG/DL (ref 155–355)
VIT B12 SERPL-MCNC: 328 PG/ML (ref 180–914)
WBC # BLD AUTO: 5.63 THOUSAND/UL (ref 4.31–10.16)

## 2024-04-20 PROCEDURE — 82728 ASSAY OF FERRITIN: CPT

## 2024-04-20 PROCEDURE — 82607 VITAMIN B-12: CPT

## 2024-04-20 PROCEDURE — 80053 COMPREHEN METABOLIC PANEL: CPT

## 2024-04-20 PROCEDURE — 83036 HEMOGLOBIN GLYCOSYLATED A1C: CPT

## 2024-04-20 PROCEDURE — 83540 ASSAY OF IRON: CPT

## 2024-04-20 PROCEDURE — 86140 C-REACTIVE PROTEIN: CPT

## 2024-04-20 PROCEDURE — 80145 DRUG ASSAY ADALIMUMAB: CPT

## 2024-04-20 PROCEDURE — 82397 CHEMILUMINESCENT ASSAY: CPT

## 2024-04-20 PROCEDURE — 82306 VITAMIN D 25 HYDROXY: CPT

## 2024-04-20 PROCEDURE — 36415 COLL VENOUS BLD VENIPUNCTURE: CPT

## 2024-04-20 PROCEDURE — 85025 COMPLETE CBC W/AUTO DIFF WBC: CPT

## 2024-04-20 PROCEDURE — 83550 IRON BINDING TEST: CPT

## 2024-04-22 DIAGNOSIS — E55.9 VITAMIN D DEFICIENCY: Primary | ICD-10-CM

## 2024-04-22 RX ORDER — ERGOCALCIFEROL 1.25 MG/1
50000 CAPSULE ORAL WEEKLY
Qty: 10 CAPSULE | Refills: 0 | Status: SHIPPED | OUTPATIENT
Start: 2024-04-22

## 2024-04-29 LAB
ADALIMUMAB AB SERPL-MCNC: <25 NG/ML
ADALIMUMAB SERPL-MCNC: 10 UG/ML

## 2024-05-31 DIAGNOSIS — K51.019 ULCERATIVE PANCOLITIS WITH COMPLICATION (HCC): ICD-10-CM

## 2024-05-31 RX ORDER — ADALIMUMAB 40MG/0.4ML
KIT SUBCUTANEOUS
Qty: 2 EACH | Refills: 3 | Status: SHIPPED | OUTPATIENT
Start: 2024-05-31

## 2024-06-04 ENCOUNTER — TELEPHONE (OUTPATIENT)
Dept: GASTROENTEROLOGY | Facility: CLINIC | Age: 34
End: 2024-06-04

## 2024-06-04 NOTE — TELEPHONE ENCOUNTER
Medication: Humira 40mg Pens  Directions: Inject 40mg biweekly  Quantity: 2 pens  Day Supply: 28  Insurance: [employer]  How Prior Auth was submitted: Page  Authorization Date range: 4/4/2024 - 6/3/2025  Authorization Number: #[id number]  Pharmacy that fills med: [facility]      Letter in Media

## 2024-06-26 ENCOUNTER — TELEPHONE (OUTPATIENT)
Age: 34
End: 2024-06-26

## 2024-06-26 NOTE — TELEPHONE ENCOUNTER
Patients GI provider:  Dr. Ortega    Number to return call: [phone]    Reason for call: Pt calling stating that he is going to stop by [place] office tomorrow to drop off  [facility] paperwork to be filled out for him.    Scheduled procedure/appointment date if applicable: Apt 9/20/24

## 2024-06-28 NOTE — TELEPHONE ENCOUNTER
Spoke with patient and informed him la paperwork is completed. Per pt's request to email the completed form to his email [email or web address].    Email has been sent.

## 2024-07-16 ENCOUNTER — APPOINTMENT (EMERGENCY)
Dept: RADIOLOGY | Facility: HOSPITAL | Age: 34
End: 2024-07-16
Payer: COMMERCIAL

## 2024-07-16 ENCOUNTER — HOSPITAL ENCOUNTER (EMERGENCY)
Facility: HOSPITAL | Age: 34
Discharge: HOME | End: 2024-07-17
Attending: EMERGENCY MEDICINE
Payer: COMMERCIAL

## 2024-07-16 VITALS
OXYGEN SATURATION: 98 % | TEMPERATURE: 98.6 F | RESPIRATION RATE: 18 BRPM | DIASTOLIC BLOOD PRESSURE: 90 MMHG | BODY MASS INDEX: 29.69 KG/M2 | SYSTOLIC BLOOD PRESSURE: 139 MMHG | HEART RATE: 88 BPM | WEIGHT: 225 LBS

## 2024-07-16 DIAGNOSIS — T18.128A ESOPHAGEAL OBSTRUCTION DUE TO FOOD IMPACTION: ICD-10-CM

## 2024-07-16 DIAGNOSIS — R11.2 NAUSEA & VOMITING: Primary | ICD-10-CM
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
## 2024-07-16 LAB
BASOPHILS # BLD AUTO: 0.09 THOUSANDS/ÂΜL (ref 0–0.1)
BASOPHILS NFR BLD AUTO: 1 % (ref 0–1)
EOSINOPHIL # BLD AUTO: 0.22 THOUSAND/ÂΜL (ref 0–0.61)
EOSINOPHIL NFR BLD AUTO: 2 % (ref 0–6)
ERYTHROCYTE [DISTWIDTH] IN BLOOD BY AUTOMATED COUNT: 12.7 % (ref 11.6–15.1)
HCT VFR BLD AUTO: 50.3 % (ref 36.5–49.3)
HGB BLD-MCNC: 17 G/DL (ref 12–17)
IMM GRANULOCYTES # BLD AUTO: 0.02 THOUSAND/UL (ref 0–0.2)
IMM GRANULOCYTES NFR BLD AUTO: 0 % (ref 0–2)
LYMPHOCYTES # BLD AUTO: 2.41 THOUSANDS/ÂΜL (ref 0.6–4.47)
LYMPHOCYTES NFR BLD AUTO: 26 % (ref 14–44)
MCH RBC QN AUTO: 29.6 PG (ref 26.8–34.3)
MCHC RBC AUTO-ENTMCNC: 33.8 G/DL (ref 31.4–37.4)
MCV RBC AUTO: 88 FL (ref 82–98)
MONOCYTES # BLD AUTO: 0.61 THOUSAND/ÂΜL (ref 0.17–1.22)
MONOCYTES NFR BLD AUTO: 7 % (ref 4–12)
NEUTROPHILS # BLD AUTO: 5.82 THOUSANDS/ÂΜL (ref 1.85–7.62)
NEUTS SEG NFR BLD AUTO: 64 % (ref 43–75)
NRBC BLD AUTO-RTO: 0 /100 WBCS
PLATELET # BLD AUTO: 278 THOUSANDS/UL (ref 149–390)
PMV BLD AUTO: 10.8 FL (ref 8.9–12.7)
RBC # BLD AUTO: 5.74 MILLION/UL (ref 3.88–5.62)
WBC # BLD AUTO: 9.17 THOUSAND/UL (ref 4.31–10.16)

## 2024-07-16 PROCEDURE — 96374 THER/PROPH/DIAG INJ IV PUSH: CPT

## 2024-07-16 PROCEDURE — 99284 EMERGENCY DEPT VISIT MOD MDM: CPT

## 2024-07-16 PROCEDURE — 80053 COMPREHEN METABOLIC PANEL: CPT | Performed by: EMERGENCY MEDICINE

## 2024-07-16 PROCEDURE — 84484 ASSAY OF TROPONIN QUANT: CPT | Performed by: EMERGENCY MEDICINE

## 2024-07-16 PROCEDURE — 96375 TX/PRO/DX INJ NEW DRUG ADDON: CPT

## 2024-07-16 PROCEDURE — 99285 EMERGENCY DEPT VISIT HI MDM: CPT | Performed by: EMERGENCY MEDICINE

## 2024-07-16 PROCEDURE — 36415 COLL VENOUS BLD VENIPUNCTURE: CPT | Performed by: EMERGENCY MEDICINE

## 2024-07-16 PROCEDURE — 85025 COMPLETE CBC W/AUTO DIFF WBC: CPT | Performed by: EMERGENCY MEDICINE

## 2024-07-16 PROCEDURE — 71046 X-RAY EXAM CHEST 2 VIEWS: CPT

## 2024-07-16 PROCEDURE — 83690 ASSAY OF LIPASE: CPT | Performed by: EMERGENCY MEDICINE

## 2024-07-16 RX ORDER — ONDANSETRON 2 MG/ML
4 INJECTION INTRAMUSCULAR; INTRAVENOUS ONCE
Status: COMPLETED | OUTPATIENT
Start: 2024-07-16 | End: 2024-07-16

## 2024-07-16 RX ORDER — PANTOPRAZOLE SODIUM 40 MG/10ML
40 INJECTION, POWDER, LYOPHILIZED, FOR SOLUTION INTRAVENOUS ONCE
Status: COMPLETED | OUTPATIENT
Start: 2024-07-16 | End: 2024-07-16

## 2024-07-16 RX ORDER — MAGNESIUM HYDROXIDE/ALUMINUM HYDROXICE/SIMETHICONE 120; 1200; 1200 MG/30ML; MG/30ML; MG/30ML
30 SUSPENSION ORAL ONCE
Status: COMPLETED | OUTPATIENT
Start: 2024-07-16 | End: 2024-07-16

## 2024-07-16 RX ADMIN — ONDANSETRON 4 MG: 2 INJECTION INTRAMUSCULAR; INTRAVENOUS at 23:32

## 2024-07-16 RX ADMIN — PANTOPRAZOLE SODIUM 40 MG: 40 INJECTION, POWDER, FOR SOLUTION INTRAVENOUS at 23:32

## 2024-07-16 RX ADMIN — ALUMINUM HYDROXIDE, MAGNESIUM HYDROXIDE, AND SIMETHICONE 30 ML: 1200; 120; 1200 SUSPENSION ORAL at 23:32

## 2024-07-17 LAB
ALBUMIN SERPL BCG-MCNC: 5 G/DL (ref 3.5–5)
ALP SERPL-CCNC: 70 U/L (ref 34–104)
ALT SERPL W P-5'-P-CCNC: 16 U/L (ref 7–52)
ANION GAP SERPL CALCULATED.3IONS-SCNC: 12 MMOL/L (ref 4–13)
AST SERPL W P-5'-P-CCNC: 18 U/L (ref 13–39)
BILIRUB SERPL-MCNC: 0.56 MG/DL (ref 0.2–1)
BUN SERPL-MCNC: 14 MG/DL (ref 5–25)
CALCIUM SERPL-MCNC: 10 MG/DL (ref 8.4–10.2)
CARDIAC TROPONIN I PNL SERPL HS: 3 NG/L (ref 8–18)
CHLORIDE SERPL-SCNC: 102 MMOL/L (ref 96–108)
CO2 SERPL-SCNC: 26 MMOL/L (ref 21–32)
CREAT SERPL-MCNC: 1.23 MG/DL (ref 0.6–1.3)
GFR SERPL CREATININE-BSD FRML MDRD: 77 ML/MIN/1.73SQ M
GLUCOSE SERPL-MCNC: 94 MG/DL (ref 65–140)
LIPASE SERPL-CCNC: 18 U/L (ref 11–82)
POTASSIUM SERPL-SCNC: 3.6 MMOL/L (ref 3.5–5.3)
PROT SERPL-MCNC: 8.7 G/DL (ref 6.4–8.4)
SODIUM SERPL-SCNC: 140 MMOL/L (ref 135–147)

## 2024-07-17 PROCEDURE — 96375 TX/PRO/DX INJ NEW DRUG ADDON: CPT

## 2024-07-17 PROCEDURE — 96361 HYDRATE IV INFUSION ADD-ON: CPT

## 2024-07-17 RX ORDER — METOCLOPRAMIDE HYDROCHLORIDE 5 MG/ML
10 INJECTION INTRAMUSCULAR; INTRAVENOUS ONCE
Status: COMPLETED | OUTPATIENT
Start: 2024-07-17 | End: 2024-07-17

## 2024-07-17 RX ADMIN — SODIUM CHLORIDE 1000 ML: 0.9 INJECTION, SOLUTION INTRAVENOUS at 01:14

## 2024-07-17 RX ADMIN — METOCLOPRAMIDE 10 MG: 5 INJECTION, SOLUTION INTRAMUSCULAR; INTRAVENOUS at 01:44

## 2024-07-17 NOTE — ED PROVIDER NOTES
History  Chief Complaint   Patient presents with    Vomiting     Pt has hx of gi issues pt states he been nauseated and unable to swallow        Vomiting  Associated symptoms: no abdominal pain, no arthralgias, no chills, no cough, no fever and no sore throat    This is a 34-year-old male he presents to the ER for evaluation of nausea and vomiting difficulty swallowing and persistent salivation.    Patient reports a history of ulcerative colitis.  He reports that he has been dealing with recent lower abdominal symptoms including loose stools and abdominal pain.  He reports that tonight he attempted to eat a meal consisting of chicken.  He took 1 bite and had acute onset of nausea vomiting and a heartburn-like sensation in his central chest.  He reports he had significant nausea and vomiting after that time including stomach contents from previous meal earlier in the day.  Since that time he has had inability to tolerate p.o. and reports persistent drooling/salivation.  He feels a persistent mild heartburn-like sensation in the central chest.  Denies any other abdominal pain or neck pain.  Denies any hematemesis or bilious emesis.  Reports things like this have happened in the past when he had nausea and vomiting he attributes this elevation to irritation from the reflux from the vomiting.  Reports that the piece of chicken that he put in his mouth and attempted to swallow came up right away it did not make it into his stomach he has low suspicion for a food bolus impaction.  He denies any previous history of esophagitis or upper endoscopy in the past.    Prior to Admission Medications   Prescriptions Last Dose Informant Patient Reported? Taking?   Humira, 2 Pen, 40 MG/0.4ML PNKT   No No   Sig: INJECT 40 MG (0.4 ML) UNDER THE SKIN EVERY 14 DAYS AS DIRECTED   bisacodyl (DULCOLAX) 5 mg EC tablet  Self No No   Sig: As per colonoscopy prep instructions.   Patient not taking: Reported on 4/18/2024   dicyclomine (BENTYL)  10 mg capsule   No No   Sig: Take 1 capsule (10 mg total) by mouth 2 (two) times a day as needed (abdominal pain)   ergocalciferol (VITAMIN D2) 50,000 units   No No   Sig: Take 1 capsule (50,000 Units total) by mouth once a week   polyethylene glycol (GLYCOLAX) 17 GM/SCOOP powder  Self No No   Sig: As per colonosocpy instructions   Patient not taking: Reported on 4/18/2024      Facility-Administered Medications: None       Past Medical History:   Diagnosis Date    Rhabdomyolysis 2011    Ulcerative colitis (HCC)        Past Surgical History:   Procedure Laterality Date    COLONOSCOPY      WISDOM TOOTH EXTRACTION         Family History   Problem Relation Age of Onset    No Known Problems Mother     No Known Problems Father     Diabetes Paternal Grandfather      I have reviewed and agree with the history as documented.    E-Cigarette/Vaping    E-Cigarette Use Never User      E-Cigarette/Vaping Substances     Social History     Tobacco Use    Smoking status: Never    Smokeless tobacco: Never   Vaping Use    Vaping status: Never Used   Substance Use Topics    Alcohol use: Not Currently    Drug use: Never       Review of Systems   Constitutional:  Negative for chills and fever.   HENT:  Positive for drooling. Negative for congestion, ear pain, sore throat and voice change.    Eyes:  Negative for pain and visual disturbance.   Respiratory:  Negative for cough and shortness of breath.    Cardiovascular:  Negative for chest pain and palpitations.   Gastrointestinal:  Positive for nausea and vomiting. Negative for abdominal pain.   Genitourinary:  Negative for dysuria and hematuria.   Musculoskeletal:  Negative for arthralgias and back pain.   Skin:  Negative for color change and rash.   Neurological:  Negative for seizures and syncope.   All other systems reviewed and are negative.      Physical Exam  Physical Exam  Vitals and nursing note reviewed. Exam conducted with a chaperone present.   Constitutional:       Appearance:  Normal appearance.      Comments: Appears uncomfortable intermittently spitting up clear saliva in an emesis basin.   HENT:      Head: Normocephalic and atraumatic.      Right Ear: External ear normal.      Left Ear: External ear normal.      Nose: Nose normal.      Mouth/Throat:      Mouth: Mucous membranes are moist.      Pharynx: Oropharynx is clear. No oropharyngeal exudate or posterior oropharyngeal erythema.   Eyes:      Conjunctiva/sclera: Conjunctivae normal.   Cardiovascular:      Rate and Rhythm: Normal rate and regular rhythm.      Pulses: Normal pulses.      Heart sounds: Normal heart sounds.   Pulmonary:      Effort: Pulmonary effort is normal.      Breath sounds: Normal breath sounds.      Comments: No crepitus to auscultation of the chest wall.  Equal breath sounds.  Abdominal:      General: Abdomen is flat.      Tenderness: There is no abdominal tenderness. There is no right CVA tenderness, guarding or rebound.   Musculoskeletal:      Cervical back: Neck supple. No rigidity.   Skin:     General: Skin is warm and dry.      Capillary Refill: Capillary refill takes less than 2 seconds.   Neurological:      General: No focal deficit present.      Mental Status: He is alert. Mental status is at baseline.         Vital Signs  ED Triage Vitals [07/16/24 2259]   Temperature Pulse Respirations Blood Pressure SpO2   98.6 °F (37 °C) 88 18 139/90 98 %      Temp Source Heart Rate Source Patient Position - Orthostatic VS BP Location FiO2 (%)   Temporal Monitor Sitting Left arm --      Pain Score       2           Vitals:    07/16/24 2259   BP: 139/90   Pulse: 88   Patient Position - Orthostatic VS: Sitting         Visual Acuity      ED Medications  Medications   ondansetron (ZOFRAN) injection 4 mg (4 mg Intravenous Given 7/16/24 2332)   pantoprazole (PROTONIX) injection 40 mg (40 mg Intravenous Given 7/16/24 2332)   aluminum-magnesium hydroxide-simethicone (MAALOX) oral suspension 30 mL (30 mL Oral Given 7/16/24  4458)   sodium chloride 0.9 % bolus 1,000 mL (0 mL Intravenous Stopped 7/17/24 0214)   metoclopramide (REGLAN) injection 10 mg (10 mg Intravenous Given 7/17/24 0144)       Diagnostic Studies  Results Reviewed       Procedure Component Value Units Date/Time    High Sensitivity Troponin I Random [[id number]]  (Abnormal) Collected: 07/16/24 2328    Lab Status: Final result Specimen: Blood from Arm, Right Updated: 07/17/24 0047     HS TnI random 3 ng/L     Comprehensive metabolic panel [[id number]]  (Abnormal) Collected: 07/16/24 2328    Lab Status: Final result Specimen: Blood from Arm, Right Updated: 07/17/24 0047     Sodium 140 mmol/L      Potassium 3.6 mmol/L      Chloride 102 mmol/L      CO2 26 mmol/L      ANION GAP 12 mmol/L      BUN 14 mg/dL      Creatinine 1.23 mg/dL      Glucose 94 mg/dL      Calcium 10.0 mg/dL      AST 18 U/L      ALT 16 U/L      Alkaline Phosphatase 70 U/L      Total Protein 8.7 g/dL      Albumin 5.0 g/dL      Total Bilirubin 0.56 mg/dL      eGFR 77 ml/min/1.73sq m     Narrative:      National Kidney Disease Foundation guidelines for Chronic Kidney Disease (CKD):     Stage 1 with normal or high GFR (GFR > 90 mL/min/1.73 square meters)    Stage 2 Mild CKD (GFR = 60-89 mL/min/1.73 square meters)    Stage 3A Moderate CKD (GFR = 45-59 mL/min/1.73 square meters)    Stage 3B Moderate CKD (GFR = 30-44 mL/min/1.73 square meters)    Stage 4 Severe CKD (GFR = 15-29 mL/min/1.73 square meters)    Stage 5 End Stage CKD (GFR <15 mL/min/1.73 square meters)  Note: GFR calculation is accurate only with a steady state creatinine    Lipase [[id number]]  (Normal) Collected: 07/16/24 2328    Lab Status: Final result Specimen: Blood from Arm, Right Updated: 07/17/24 0047     Lipase 18 u/L     CBC and differential [[id number]]  (Abnormal) Collected: 07/16/24 2328    Lab Status: Final result Specimen: Blood from Arm, Right Updated: 07/16/24 2341     WBC 9.17 Thousand/uL      RBC 5.74 Million/uL      Hemoglobin 17.0 g/dL       Hematocrit 50.3 %      MCV 88 fL      MCH 29.6 pg      MCHC 33.8 g/dL      RDW 12.7 %      MPV 10.8 fL      Platelets 278 Thousands/uL      nRBC 0 /100 WBCs      Segmented % 64 %      Immature Grans % 0 %      Lymphocytes % 26 %      Monocytes % 7 %      Eosinophils Relative 2 %      Basophils Relative 1 %      Absolute Neutrophils 5.82 Thousands/µL      Absolute Immature Grans 0.02 Thousand/uL      Absolute Lymphocytes 2.41 Thousands/µL      Absolute Monocytes 0.61 Thousand/µL      Eosinophils Absolute 0.22 Thousand/µL      Basophils Absolute 0.09 Thousands/µL                    XR chest 2 views   ED Interpretation by Mitchel Pedro DO (07/16 2359)   X-ray of the chest interpreted by myself pending official radiology report.  No acute cardiopulmonary process seen.      Final Result by Vladimir Alves MD (07/17 0509)      No acute cardiopulmonary disease.      Findings are stable      Workstation performed: [id number]                    Procedures  Procedures         ED Course                                 SBIRT 20yo+      Flowsheet Row Most Recent Value   Initial Alcohol Screen: US AUDIT-C     1. How often do you have a drink containing alcohol? 0 Filed at: 07/16/2024 2302   2. How many drinks containing alcohol do you have on a typical day you are drinking?  0 Filed at: 07/16/2024 2302   3a. Male UNDER 65: How often do you have five or more drinks on one occasion? 0 Filed at: 07/16/2024 2302   3b. FEMALE Any Age, or MALE 65+: How often do you have 4 or more drinks on one occassion? 0 Filed at: 07/16/2024 2302   Audit-C Score 0 Filed at: 07/16/2024 2302   LINDA: How many times in the past year have you...    Used an illegal drug or used a prescription medication for non-medical reasons? Never Filed at: 07/16/2024 2302                      Medical Decision Making  34-year-old male presenting for chest discomfort described as heartburn, persistent drooling/salivation/spitting up and inability to  tolerate p.o.  Presentation is highly consistent with an impacted esophageal food bolus however patient reports that he has had similar symptoms to this in the past associated with reflux esophagitis and does not feel that he is having food bolus impaction due to history of vomiting food contents from stomach after onset of the symptoms.  Discussed with patient CT imaging to help evaluate for esophageal injury, food bolus impaction.  As patient reports that she has had similar symptoms in the past and it feels no different he is requesting to trial medications and start with x-ray which I think is reasonable.  Screening labs to evaluate for other causes such as GI, pancreatitis.  Chest x-ray reviewed and normal.  Epic downtime began with only CBC resulted (which was essentially normal).  Signout to BC pending rest of labs and clinical reassessment with low threshold to obtain CT imaging if consistent clinical symptoms/inability tolerate p.o.    After signout it appears patient had further emesis consisting of a piece of sausage and had immediate alleviation of his symptoms consistent with food bolus impaction.  Patient without any residual symptoms and was tolerating p.o. and was discharged with outpatient follow-up and return precautions.    Problems Addressed:  Esophageal obstruction due to food impaction: acute illness or injury  Nausea & vomiting: acute illness or injury    Amount and/or Complexity of Data Reviewed  Labs: ordered.  Radiology: ordered and independent interpretation performed.    Risk  OTC drugs.  Prescription drug management.                 Disposition  Final diagnoses:   Nausea & vomiting   Esophageal obstruction due to food impaction     Time reflects when diagnosis was documented in both MDM as applicable and the Disposition within this note       Time User Action Codes Description Comment    7/17/2024  2:34 AM Cody Rebollar Add [R11.2] Nausea & vomiting     7/17/2024  2:34 AM  Cody Rebollar Add [T18.128A,  W44.F3XA] Esophageal obstruction due to food impaction           ED Disposition       ED Disposition   Discharge    Condition   Stable    Date/Time   Wed Jul 17, 2024 0233    Comment   Robin Hector discharge to home/self care.                   Follow-up Information       Follow up With Specialties Details Why Contact Info Additional Information     [facility] Call  For re-evaluation [address] [facility] [address] [phone] [facility] [place], [address] [place], [place], [address] [phone]            Discharge Medication List as of 7/17/2024  2:34 AM        CONTINUE these medications which have NOT CHANGED    Details   bisacodyl (DULCOLAX) 5 mg EC tablet As per colonoscopy prep instructions., Normal      dicyclomine (BENTYL) 10 mg capsule Take 1 capsule (10 mg total) by mouth 2 (two) times a day as needed (abdominal pain), Starting Thu 4/18/2024, Normal      ergocalciferol (VITAMIN D2) 50,000 units Take 1 capsule (50,000 Units total) by mouth once a week, Starting Mon 4/22/2024, Normal      Humira, 2 Pen, 40 MG/0.4ML PNKT INJECT 40 MG (0.4 ML) UNDER THE SKIN EVERY 14 DAYS AS DIRECTED, Normal      polyethylene glycol (GLYCOLAX) 17 GM/SCOOP powder As per colonosocpy instructions, Normal             No discharge procedures on file.    PDMP Review       None            ED Provider  Electronically Signed by             Mitchel Pedro DO  07/17/24 0237

## 2024-07-17 NOTE — ED RE-EVALUATION NOTE
Patient states that he threw up a chunk of sausage and now feels tremendously better. States he is no longer have recurrent spitting, is tolerating fluids, requesting to eat food. At this time, suspect esophageal impaction was cause of symptoms given immediate return to baseline. Will have patient follow up with GI, he is tolerating PO, discharge with strict return precautions.     Cody Rebollar MD  07/17/24 0230

## 2024-07-25 ENCOUNTER — TELEPHONE (OUTPATIENT)
Age: 34
End: 2024-07-25

## 2024-07-25 NOTE — TELEPHONE ENCOUNTER
Patients GI provider:  PASHA Jansen     Number to return call: [phone]    Reason for call: Patient is expressing frustration regarding not receiving pt's Humira on time. Patient is due this weekend and states this can not keep happening where he keeps missing dosage because of insurance. Patient stated he ended up in the hospital last time this happened. Patient is requesting an urgent call back at the number above, thank you.    Scheduled procedure/appointment date if applicable: Apt/procedure 9/20/2024

## 2024-07-26 NOTE — TELEPHONE ENCOUNTER
Authorization is valid and on file for Humira 40mg Biweekly.     Shipment is scheduled to be delivered on 7/30. Last shipment looks like it was dispensed on 6/28.     Will need to contact [facility] when open to see what the delay is

## 2024-07-26 NOTE — TELEPHONE ENCOUNTER
Patient called and stated he said that [facility] its not approved and needs to have his medication asap please review and reach out patient is very anxious and doesn't want to end up in the hospital again thank you

## 2024-07-26 NOTE — TELEPHONE ENCOUNTER
Called [facility] at [phone]. Automated system stated the order for Humira is scheduled to be delivered to patient tomorrow. Selected option to speak with a representative.     Spoke with Christa. She stated they have a paid claim on account and it is scheduled for tomorrow. She stated a supervisor approved next day shipment.     Called and spoke with patient. He is aware of medication being shipped to him tomorrow. He said this is a common theme with [facility]. They send him refill requests, he says yes to refill and  they come back with some problem or say our office didn't do what we needed to. I confirmed authorization is on file, script with refills is on file so I truly believe it is a problem on [facility] end. I advised patient to call me first thing Monday if he does not get his medication tomorrow. I also advised to call me right away if this happens next time he tries to fill his medication as well.     Patient was appreciative of the help

## 2024-08-27 DIAGNOSIS — E53.8 VITAMIN B12 DEFICIENCY: ICD-10-CM

## 2024-08-27 DIAGNOSIS — E55.9 VITAMIN D DEFICIENCY: Primary | ICD-10-CM

## 2024-08-27 RX ORDER — ERGOCALCIFEROL 1.25 MG/1
50000 CAPSULE, LIQUID FILLED ORAL WEEKLY
Qty: 10 CAPSULE | Refills: 0 | OUTPATIENT
Start: 2024-08-27

## 2024-08-27 RX ORDER — MULTIVIT-MIN/IRON/FOLIC ACID/K 18-600-40
2 CAPSULE ORAL DAILY
Qty: 180 CAPSULE | Refills: 3 | Status: SHIPPED | OUTPATIENT
Start: 2024-08-27

## 2024-08-27 NOTE — TELEPHONE ENCOUNTER
I spoke with the patient and relayed provider's message requesting bloodwork to check current levels of Vitamin B 12 and Vitamin D prior to Vitamin D refill. Pt declines bloodwork due to cost. Pt aware to discuss with Dr. Ortega at visit in September and Vitamin D maintenance dosing has been sent to [facility] in [place].

## 2024-09-06 ENCOUNTER — TELEPHONE (OUTPATIENT)
Age: 34
End: 2024-09-06

## 2024-09-06 NOTE — TELEPHONE ENCOUNTER
"Pt calling to speak with Shruti. He states \"he is working so, so leave a message with whatever it is they need\"  "

## 2024-09-06 NOTE — TELEPHONE ENCOUNTER
Called patient, reached voicemail, left detailed message vitamin D prescription has been sent to [facility] in [place] as during our conversation last week pt requested a call back to relay this information again.
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
## 2024-09-20 ENCOUNTER — OFFICE VISIT (OUTPATIENT)
Age: 34
End: 2024-09-20
Payer: COMMERCIAL

## 2024-09-20 VITALS
HEIGHT: 73 IN | BODY MASS INDEX: 29.26 KG/M2 | TEMPERATURE: 97.4 F | HEART RATE: 68 BPM | DIASTOLIC BLOOD PRESSURE: 70 MMHG | OXYGEN SATURATION: 98 % | SYSTOLIC BLOOD PRESSURE: 116 MMHG | WEIGHT: 220.8 LBS

## 2024-09-20 DIAGNOSIS — K50.014 CROHN'S DISEASE OF SMALL INTESTINE WITH ABSCESS (HCC): ICD-10-CM

## 2024-09-20 DIAGNOSIS — R12 HEARTBURN: ICD-10-CM

## 2024-09-20 DIAGNOSIS — R13.10 DYSPHAGIA, UNSPECIFIED TYPE: ICD-10-CM

## 2024-09-20 DIAGNOSIS — K51.019 ULCERATIVE PANCOLITIS WITH COMPLICATION (HCC): Primary | ICD-10-CM

## 2024-09-20 DIAGNOSIS — E55.9 VITAMIN D DEFICIENCY: ICD-10-CM

## 2024-09-20 DIAGNOSIS — R10.9 ABDOMINAL PAIN, UNSPECIFIED ABDOMINAL LOCATION: ICD-10-CM

## 2024-09-20 DIAGNOSIS — D84.9 IMMUNOCOMPROMISED STATE (HCC): ICD-10-CM

## 2024-09-20 PROCEDURE — 99214 OFFICE O/P EST MOD 30 MIN: CPT | Performed by: INTERNAL MEDICINE

## 2024-09-30 DIAGNOSIS — K51.019 ULCERATIVE PANCOLITIS WITH COMPLICATION (HCC): ICD-10-CM

## 2024-09-30 RX ORDER — ADALIMUMAB 40MG/0.4ML
KIT SUBCUTANEOUS
Qty: 2 EACH | Refills: 3 | Status: SHIPPED | OUTPATIENT
Start: 2024-09-30

## 2024-10-23 ENCOUNTER — TELEPHONE (OUTPATIENT)
Age: 34
End: 2024-10-23

## 2024-10-23 ENCOUNTER — APPOINTMENT (RX ONLY)
Dept: URBAN - NONMETROPOLITAN AREA CLINIC 4 | Facility: CLINIC | Age: 34
Setting detail: DERMATOLOGY
End: 2024-10-23

## 2024-10-23 DIAGNOSIS — Z71.89 OTHER SPECIFIED COUNSELING: ICD-10-CM

## 2024-10-23 DIAGNOSIS — L82.1 OTHER SEBORRHEIC KERATOSIS: ICD-10-CM

## 2024-10-23 DIAGNOSIS — D18.0 HEMANGIOMA: ICD-10-CM

## 2024-10-23 DIAGNOSIS — L57.8 OTHER SKIN CHANGES DUE TO CHRONIC EXPOSURE TO NONIONIZING RADIATION: ICD-10-CM

## 2024-10-23 DIAGNOSIS — B07.8 OTHER VIRAL WARTS: ICD-10-CM

## 2024-10-23 DIAGNOSIS — D22 MELANOCYTIC NEVI: ICD-10-CM

## 2024-10-23 DIAGNOSIS — L81.4 OTHER MELANIN HYPERPIGMENTATION: ICD-10-CM

## 2024-10-23 DIAGNOSIS — L91.8 OTHER HYPERTROPHIC DISORDERS OF THE SKIN: ICD-10-CM

## 2024-10-23 PROBLEM — D48.5 NEOPLASM OF UNCERTAIN BEHAVIOR OF SKIN: Status: ACTIVE | Noted: 2024-10-23

## 2024-10-23 PROBLEM — D22.39 MELANOCYTIC NEVI OF OTHER PARTS OF FACE: Status: ACTIVE | Noted: 2024-10-23

## 2024-10-23 PROBLEM — D22.62 MELANOCYTIC NEVI OF LEFT UPPER LIMB, INCLUDING SHOULDER: Status: ACTIVE | Noted: 2024-10-23

## 2024-10-23 PROBLEM — D22.61 MELANOCYTIC NEVI OF RIGHT UPPER LIMB, INCLUDING SHOULDER: Status: ACTIVE | Noted: 2024-10-23

## 2024-10-23 PROBLEM — D22.5 MELANOCYTIC NEVI OF TRUNK: Status: ACTIVE | Noted: 2024-10-23

## 2024-10-23 PROBLEM — D22.72 MELANOCYTIC NEVI OF LEFT LOWER LIMB, INCLUDING HIP: Status: ACTIVE | Noted: 2024-10-23

## 2024-10-23 PROBLEM — D22.71 MELANOCYTIC NEVI OF RIGHT LOWER LIMB, INCLUDING HIP: Status: ACTIVE | Noted: 2024-10-23

## 2024-10-23 PROBLEM — D18.01 HEMANGIOMA OF SKIN AND SUBCUTANEOUS TISSUE: Status: ACTIVE | Noted: 2024-10-23

## 2024-10-23 PROBLEM — D22.22 MELANOCYTIC NEVI OF LEFT EAR AND EXTERNAL AURICULAR CANAL: Status: ACTIVE | Noted: 2024-10-23

## 2024-10-23 PROCEDURE — ? FULL BODY SKIN EXAM

## 2024-10-23 PROCEDURE — ? LIQUID NITROGEN

## 2024-10-23 PROCEDURE — ? DEFER

## 2024-10-23 PROCEDURE — 17110 DESTRUCTION B9 LES UP TO 14: CPT

## 2024-10-23 PROCEDURE — ? PHOTO-DOCUMENTATION

## 2024-10-23 PROCEDURE — ? COUNSELING

## 2024-10-23 PROCEDURE — 99203 OFFICE O/P NEW LOW 30 MIN: CPT | Mod: 25

## 2024-10-23 PROCEDURE — ? SUNSCREEN RECOMMENDATIONS

## 2024-10-23 PROCEDURE — ? TREATMENT REGIMEN

## 2024-10-23 ASSESSMENT — LOCATION ZONE DERM
LOCATION ZONE: HAND
LOCATION ZONE: TRUNK
LOCATION ZONE: LEG
LOCATION ZONE: FACE
LOCATION ZONE: EAR
LOCATION ZONE: SCALP
LOCATION ZONE: FEET
LOCATION ZONE: FINGER
LOCATION ZONE: ARM

## 2024-10-23 ASSESSMENT — LOCATION DETAILED DESCRIPTION DERM
LOCATION DETAILED: RIGHT MEDIAL UPPER BACK
LOCATION DETAILED: LEFT ANTERIOR EARLOBE
LOCATION DETAILED: RIGHT LATERAL UPPER BACK
LOCATION DETAILED: LEFT CENTRAL MANDIBULAR CHEEK
LOCATION DETAILED: LEFT CENTRAL BUCCAL CHEEK
LOCATION DETAILED: RIGHT CENTRAL FRONTAL SCALP
LOCATION DETAILED: SUPERIOR THORACIC SPINE
LOCATION DETAILED: RIGHT RADIAL PALM
LOCATION DETAILED: RIGHT DISTAL DORSAL FOREARM
LOCATION DETAILED: RIGHT MEDIAL FOREHEAD
LOCATION DETAILED: LEFT ANTERIOR PROXIMAL THIGH
LOCATION DETAILED: LEFT MEDIAL FOREHEAD
LOCATION DETAILED: RIGHT ANTERIOR SHOULDER
LOCATION DETAILED: LEFT MEDIAL UPPER BACK
LOCATION DETAILED: RIGHT MID-UPPER BACK
LOCATION DETAILED: RIGHT DORSAL FOOT
LOCATION DETAILED: LEFT DORSAL FOOT
LOCATION DETAILED: RIGHT ULNAR PALM
LOCATION DETAILED: LEFT ANTERIOR SHOULDER
LOCATION DETAILED: RIGHT ANTERIOR PROXIMAL UPPER ARM
LOCATION DETAILED: LEFT ANTERIOR PROXIMAL UPPER ARM
LOCATION DETAILED: RIGHT MID RADIAL DORSAL MIDDLE FINGER
LOCATION DETAILED: RIGHT PROXIMAL DORSAL FOREARM

## 2024-10-23 ASSESSMENT — LOCATION SIMPLE DESCRIPTION DERM
LOCATION SIMPLE: RIGHT HAND
LOCATION SIMPLE: LEFT FOREHEAD
LOCATION SIMPLE: RIGHT UPPER BACK
LOCATION SIMPLE: RIGHT MIDDLE FINGER
LOCATION SIMPLE: LEFT UPPER ARM
LOCATION SIMPLE: LEFT THIGH
LOCATION SIMPLE: LEFT CHEEK
LOCATION SIMPLE: LEFT UPPER BACK
LOCATION SIMPLE: RIGHT SHOULDER
LOCATION SIMPLE: RIGHT UPPER ARM
LOCATION SIMPLE: LEFT EAR
LOCATION SIMPLE: UPPER BACK
LOCATION SIMPLE: SCALP
LOCATION SIMPLE: RIGHT FOREHEAD
LOCATION SIMPLE: RIGHT FOOT
LOCATION SIMPLE: LEFT FOOT
LOCATION SIMPLE: LEFT SHOULDER
LOCATION SIMPLE: RIGHT FOREARM

## 2024-10-23 ASSESSMENT — TOTAL NUMBER OF VERRUCA VULGARIS: # OF LESIONS?: 4

## 2024-10-23 ASSESSMENT — PAIN INTENSITY VAS: HOW INTENSE IS YOUR PAIN 0 BEING NO PAIN, 10 BEING THE MOST SEVERE PAIN POSSIBLE?: NO PAIN

## 2024-10-23 NOTE — TELEPHONE ENCOUNTER
Returned call to patient to explain he would have to reschedule as he was going to be past late policy 15 min timeframe.     Patient opted to cancel the appointment. Cancelled appointment per patient's request.

## 2024-10-23 NOTE — TELEPHONE ENCOUNTER
Received call from patient saying he is running half an hour late for his 10/23/24 appointment in [place] at 11:00 am with Steve. Informed patient of late policy.     (Call was disconnected as patient was driving.)

## 2024-10-23 NOTE — PROCEDURE: DEFER
Detail Level: Detailed
Procedure To Be Performed At Next Visit: Shave Removal
Introduction Text (Please End With A Colon): Defer:
Size Of Lesion In Cm (Optional): 0.4
X Size Of Lesion In Cm (Optional): 0

## 2024-10-23 NOTE — PROCEDURE: LIQUID NITROGEN
Detail Level: Detailed
Include Z78.9 (Other Specified Conditions Influencing Health Status) As An Associated Diagnosis?: No
Show Spray Paint Technique Variable?: Yes
Post-Care Instructions: I reviewed with the patient in detail post-care instructions. Patient is to wear sunprotection, and avoid picking at any of the treated lesions. Pt may apply Vaseline to crusted or scabbing areas.
Duration Of Freeze Thaw-Cycle (Seconds): 3
Spray Paint Text: The liquid nitrogen was applied to the skin utilizing a spray paint frosting technique.
Consent: The patient's consent was obtained including but not limited to risks of crusting, scabbing, blistering, scarring, darker or lighter pigmentary change, recurrence, incomplete removal and infection.
Application Tool (Optional): Cry-AC
Number Of Freeze-Thaw Cycles: 3 freeze-thaw cycles
Medical Necessity Information: It is in your best interest to select a reason for this procedure from the list below. All of these items fulfill various CMS LCD requirements except the new and changing color options.
Medical Necessity Clause: This procedure was medically necessary because the lesions that were treated were:

## 2024-10-28 ENCOUNTER — TELEPHONE (OUTPATIENT)
Age: 34
End: 2024-10-28

## 2024-10-28 NOTE — LETTER
October 28, 2024    Robin KATJA Krunal  [address] [place] [id number]      Dear Mr. Hector:    Below are your prep instructions for your upcoming procedure on 2/14/25. If you have any questions or concerns please contact us at [phone].    Thank you,     [facility]            Medicine Instructions for Adults with Diabetes (NO Bowel Prep)      Follow these instructions when a BOWEL PREP is NOT required for your procedure or surgery!    NOTE:  GLP-1 Agonists taken weekly: do not take in the 7 days before your procedure  SGLT-2 Inhibitors: do not take in the 4 days before your procedure    On the Day Before Surgery/Procedure  If you are having a procedure (e.g. Colonoscopy) or surgery which DOES NOT require a bowel prep, follow the directions below based on the type of medicine you take for your diabetes.    Type of Medicine You Take Examples What to Do   Pre-Mixed Insulin - Intermediate Acting Humalog® 75/25, Humulin® 70/30, Novolog® 70/30, Regular Insulin Take ½ your regular dose the evening before your procedure.   Rapid/Fast Acting Insulin/Long-Acting Insulin Humalog® U200, NovoLog®, Apidra®, Lantus®, Levemir®, Tresiba®, Toujeo®, Fiasp®, Basaglar® Take your FULL regular dose the day before procedure.   Oral Diabetic Medicines (sulfonylurea) Glipizide/Glimepiride/Glucotrol® Take your regular dose with dinner the evening before your procedure.   Other Oral Diabetic Medicines   Metformin®, Glucophage®, Glucophage XR®, Riomet®, Glumetza®), Actose®, Avandia®, Glyset®, Prandin® Take your regular dose with dinner the evening before your procedure   GLP-1 Agonists Adlyxin®, Byetta®, Bydureon®, Ozempic®, Soliqua®, Tanzeum®, Trulicity®, Victoza®, Saxenda®, Rybelsus® If taken daily, take as normal    If taken weekly, do not take this medicine for 7 days before your procedure including the day of the procedure (resume taking after the procedure)   SGLT-2  Inhibitors Jardiance®, Invokana®, Farxiga®,   Steglatro®, Brenzavvy®, Qtern®, Segluromet®, Glyxambi®, Synjardy®, Synjardy XR®, Invokamet®, Invokamet XR®, Trijary XR®, Xigduo XR®, Steglujan® Do not take for 4 days before your procedure including the day of the procedure (resume taking after the procedure)                 More information continued on back                  Medicine Instructions for Adults with Diabetes (No Bowel Prep)   Page 2      On the Day of Surgery/Procedure  Follow the directions below based on the type of medicine you take for your diabetes.    Type of Medicine You Take Examples What to Do   Long-Acting Insulin Lantus®, Levemir®, Tresiba®, Toujeo®, Basaglar®, Semglee® If you normally take your Long-Acting Insulin in the morning, take the full dose as scheduled.   GLP-1 Agonists AdlyxinÒ, ByettaÒ, BydureonÒ, OzempicÒ, SoliquaÒ, TanzeumÒ, TrulicityÒ, VictozaÒ, Saxenda®, Rybelsus® Do NOT take this medicine on the day of your procedure (resume taking after the procedure)       On the Day of Surgery/Procedure (continued)  Except for the morning Long-Acting Insulin, DO NOT take ANY diabetic medicine on the day of your procedure unless you were instructed by the doctor who manages your diabetes medicines.    Continue to check your blood sugars.  If you have an insulin pump, ask your endocrinologist for instructions at least 3 days before your procedure. NOTE: If you are not able to ask your endocrinologist in advance, on the day of the procedure set your insulin pump to your basal rate only. Bring your insulin pump supplies to the hospital.     If you have any questions about taking your diabetes medicines prior to your procedure, please contact the doctor who manages your diabetes medicines.

## 2024-10-28 NOTE — TELEPHONE ENCOUNTER
Patient calling to reschedule his EGD. EGD has been rescheduled for 2/14/25 with  at [facility]. Prep instructions sent via [employer].

## 2024-11-05 ENCOUNTER — APPOINTMENT (RX ONLY)
Dept: URBAN - NONMETROPOLITAN AREA CLINIC 4 | Facility: CLINIC | Age: 34
Setting detail: DERMATOLOGY
End: 2024-11-05

## 2024-11-05 ENCOUNTER — TELEPHONE (OUTPATIENT)
Dept: GASTROENTEROLOGY | Facility: CLINIC | Age: 34
End: 2024-11-05

## 2024-11-05 DIAGNOSIS — D22 MELANOCYTIC NEVI: ICD-10-CM

## 2024-11-05 PROBLEM — D48.5 NEOPLASM OF UNCERTAIN BEHAVIOR OF SKIN: Status: ACTIVE | Noted: 2024-11-05

## 2024-11-05 PROCEDURE — 11306 SHAVE SKIN LESION 0.6-1.0 CM: CPT

## 2024-11-05 PROCEDURE — ? SHAVE REMOVAL

## 2024-11-05 PROCEDURE — ? COUNSELING

## 2024-11-05 ASSESSMENT — LOCATION SIMPLE DESCRIPTION DERM: LOCATION SIMPLE: SCALP

## 2024-11-05 ASSESSMENT — LOCATION ZONE DERM: LOCATION ZONE: SCALP

## 2024-11-05 ASSESSMENT — LOCATION DETAILED DESCRIPTION DERM: LOCATION DETAILED: RIGHT CENTRAL PARIETAL SCALP

## 2024-11-05 NOTE — TELEPHONE ENCOUNTER
Calling because we need to cancel and RES 3/27/25 appt w/ . You have another appointment with him on 12/19/24. We will address at that time. Thank you.

## 2024-11-05 NOTE — PROCEDURE: SHAVE REMOVAL
Medical Necessity Information: It is in your best interest to select a reason for this procedure from the list below. All of these items fulfill various CMS LCD requirements except the new and changing color options.
Medical Necessity Clause: This procedure was medically necessary because the lesion that was treated was:
Lab: 6
Lab Facility: 3
Detail Level: Detailed
Was A Bandage Applied: Yes
Size Of Lesion In Cm (Required): 0.6
X Size Of Lesion In Cm (Optional): 0
Depth Of Shave: dermis
Biopsy Method: curette
Anesthesia Type: 1% lidocaine with epinephrine
Anesthesia Volume In Cc: 1
Hemostasis: Aluminum Chloride
Wound Care: Petrolatum
Path Notes (To The Dermatopathologist): Please check margins
Render Path Notes In Note?: No
Consent was obtained from the patient. The risks and benefits to therapy were discussed in detail. Specifically, the risks of infection, scarring, bleeding, prolonged wound healing, incomplete removal, allergy to anesthesia, nerve injury and recurrence were addressed. Prior to the procedure, the treatment site was clearly identified and confirmed by the patient. All components of Universal Protocol/PAUSE Rule completed.
Post-Care Instructions: I reviewed with the patient in detail post-care instructions. Patient is to keep the biopsy site dry overnight, and then apply bacitracin twice daily until healed. Patient may apply hydrogen peroxide soaks to remove any crusting.
Notification Instructions: Patient will be notified of pathology results. However, patient instructed to call the office if not contacted within 2 weeks.
Billing Type: Third-Party Bill

## 2024-12-03 ENCOUNTER — APPOINTMENT (OUTPATIENT)
Dept: URBAN - NONMETROPOLITAN AREA CLINIC 4 | Facility: CLINIC | Age: 34
Setting detail: DERMATOLOGY
End: 2024-12-03

## 2024-12-03 DIAGNOSIS — B07.8 OTHER VIRAL WARTS: ICD-10-CM

## 2024-12-03 PROCEDURE — ? TREATMENT REGIMEN

## 2024-12-03 PROCEDURE — 99213 OFFICE O/P EST LOW 20 MIN: CPT

## 2024-12-03 PROCEDURE — ? OTHER

## 2024-12-03 PROCEDURE — ? COUNSELING

## 2024-12-03 ASSESSMENT — LOCATION DETAILED DESCRIPTION DERM
LOCATION DETAILED: LEFT ULNAR PALM
LOCATION DETAILED: RIGHT ULNAR PALM
LOCATION DETAILED: RIGHT RADIAL PALM

## 2024-12-03 ASSESSMENT — LOCATION SIMPLE DESCRIPTION DERM
LOCATION SIMPLE: RIGHT HAND
LOCATION SIMPLE: LEFT HAND

## 2024-12-03 ASSESSMENT — LOCATION ZONE DERM: LOCATION ZONE: HAND

## 2024-12-03 ASSESSMENT — TOTAL NUMBER OF VERRUCA VULGARIS: # OF LESIONS?: 4

## 2025-01-03 NOTE — PROCEDURE: OTHER
Patient is calling to follow up about home infusion. Please call patient back.  
Other (Free Text): Warts were resolving. I recommended LN2 or Canthardian solution today, but the patient denied. He stated he was going to 'cut off' at home. Follow-up for FBSE @ anniversary. 
Detail Level: Zone
Render Risk Assessment In Note?: no
Note Text (......Xxx Chief Complaint.): This diagnosis correlates with the

## 2025-01-20 DIAGNOSIS — K51.019 ULCERATIVE PANCOLITIS WITH COMPLICATION (HCC): ICD-10-CM

## 2025-01-20 RX ORDER — ADALIMUMAB 40MG/0.4ML
KIT SUBCUTANEOUS
Qty: 2 EACH | Refills: 5 | Status: SHIPPED | OUTPATIENT
Start: 2025-01-20

## 2025-01-27 RX ORDER — SODIUM CHLORIDE, SODIUM LACTATE, POTASSIUM CHLORIDE, CALCIUM CHLORIDE 600; 310; 30; 20 MG/100ML; MG/100ML; MG/100ML; MG/100ML
20 INJECTION, SOLUTION INTRAVENOUS CONTINUOUS
OUTPATIENT
Start: 2025-01-27

## 2025-01-27 RX ORDER — SODIUM CHLORIDE, SODIUM LACTATE, POTASSIUM CHLORIDE, CALCIUM CHLORIDE 600; 310; 30; 20 MG/100ML; MG/100ML; MG/100ML; MG/100ML
125 INJECTION, SOLUTION INTRAVENOUS CONTINUOUS
OUTPATIENT
Start: 2025-01-27

## 2025-02-14 ENCOUNTER — ANESTHESIA EVENT (OUTPATIENT)
Dept: PERIOP | Facility: HOSPITAL | Age: 35
End: 2025-02-14
Payer: COMMERCIAL

## 2025-02-14 ENCOUNTER — ANESTHESIA (OUTPATIENT)
Dept: PERIOP | Facility: HOSPITAL | Age: 35
End: 2025-02-14
Payer: COMMERCIAL

## 2025-02-14 ENCOUNTER — HOSPITAL ENCOUNTER (OUTPATIENT)
Dept: PERIOP | Facility: HOSPITAL | Age: 35
Setting detail: OUTPATIENT SURGERY
End: 2025-02-14
Attending: INTERNAL MEDICINE
Payer: COMMERCIAL

## 2025-02-14 VITALS
RESPIRATION RATE: 18 BRPM | HEART RATE: 53 BPM | BODY MASS INDEX: 29.82 KG/M2 | TEMPERATURE: 97.4 F | HEIGHT: 73 IN | SYSTOLIC BLOOD PRESSURE: 131 MMHG | DIASTOLIC BLOOD PRESSURE: 78 MMHG | WEIGHT: 225 LBS | OXYGEN SATURATION: 97 %

## 2025-02-14 DIAGNOSIS — R13.10 DYSPHAGIA, UNSPECIFIED TYPE: ICD-10-CM

## 2025-02-14 DIAGNOSIS — K51.019 ULCERATIVE PANCOLITIS WITH COMPLICATION (HCC): ICD-10-CM

## 2025-02-14 DIAGNOSIS — R12 HEARTBURN: ICD-10-CM

## 2025-02-14 PROCEDURE — 88305 TISSUE EXAM BY PATHOLOGIST: CPT | Performed by: PATHOLOGY

## 2025-02-14 PROCEDURE — 43239 EGD BIOPSY SINGLE/MULTIPLE: CPT | Performed by: INTERNAL MEDICINE

## 2025-02-14 RX ORDER — PROPOFOL 10 MG/ML
INJECTION, EMULSION INTRAVENOUS AS NEEDED
Status: DISCONTINUED | OUTPATIENT
Start: 2025-02-14 | End: 2025-02-14

## 2025-02-14 RX ORDER — GLYCOPYRROLATE 0.2 MG/ML
INJECTION INTRAMUSCULAR; INTRAVENOUS AS NEEDED
Status: DISCONTINUED | OUTPATIENT
Start: 2025-02-14 | End: 2025-02-14

## 2025-02-14 RX ORDER — SODIUM CHLORIDE, SODIUM LACTATE, POTASSIUM CHLORIDE, CALCIUM CHLORIDE 600; 310; 30; 20 MG/100ML; MG/100ML; MG/100ML; MG/100ML
20 INJECTION, SOLUTION INTRAVENOUS CONTINUOUS
Status: DISCONTINUED | OUTPATIENT
Start: 2025-02-14 | End: 2025-02-18 | Stop reason: HOSPADM

## 2025-02-14 RX ORDER — SODIUM CHLORIDE, SODIUM LACTATE, POTASSIUM CHLORIDE, CALCIUM CHLORIDE 600; 310; 30; 20 MG/100ML; MG/100ML; MG/100ML; MG/100ML
INJECTION, SOLUTION INTRAVENOUS CONTINUOUS PRN
Status: DISCONTINUED | OUTPATIENT
Start: 2025-02-14 | End: 2025-02-14

## 2025-02-14 RX ORDER — SODIUM CHLORIDE, SODIUM LACTATE, POTASSIUM CHLORIDE, CALCIUM CHLORIDE 600; 310; 30; 20 MG/100ML; MG/100ML; MG/100ML; MG/100ML
125 INJECTION, SOLUTION INTRAVENOUS CONTINUOUS
Status: DISCONTINUED | OUTPATIENT
Start: 2025-02-14 | End: 2025-02-18 | Stop reason: HOSPADM

## 2025-02-14 RX ADMIN — SODIUM CHLORIDE, SODIUM LACTATE, POTASSIUM CHLORIDE, AND CALCIUM CHLORIDE 125 ML/HR: .6; .31; .03; .02 INJECTION, SOLUTION INTRAVENOUS at 07:05

## 2025-02-14 RX ADMIN — PROPOFOL 150 MG: 10 INJECTION, EMULSION INTRAVENOUS at 07:32

## 2025-02-14 RX ADMIN — SODIUM CHLORIDE, SODIUM LACTATE, POTASSIUM CHLORIDE, AND CALCIUM CHLORIDE: .6; .31; .03; .02 INJECTION, SOLUTION INTRAVENOUS at 07:28

## 2025-02-14 RX ADMIN — PROPOFOL 50 MG: 10 INJECTION, EMULSION INTRAVENOUS at 07:34

## 2025-02-14 RX ADMIN — GLYCOPYRROLATE 0.2 MG: 0.2 INJECTION INTRAMUSCULAR; INTRAVENOUS at 07:32

## 2025-02-14 NOTE — H&P
History and Physical -  [facility]  Robin Hector 35 y.o. male MRN: [id number]                  HPI: Robin Hector is a 35 y.o. year old male who presents for dysphagia      REVIEW OF SYSTEMS: Per the HPI, and otherwise unremarkable.    Historical Information   Past Medical History:   Diagnosis Date    Rhabdomyolysis 2011    Ulcerative colitis (HCC)      Past Surgical History:   Procedure Laterality Date    COLONOSCOPY      WISDOM TOOTH EXTRACTION       Social History   Social History     Substance and Sexual Activity   Alcohol Use Not Currently     Social History     Substance and Sexual Activity   Drug Use Never     Social History     Tobacco Use   Smoking Status Never   Smokeless Tobacco Never     Family History   Problem Relation Age of Onset    No Known Problems Mother     No Known Problems Father     Diabetes Paternal Grandfather        Meds/Allergies     Not in a hospital admission.    Allergies   Allergen Reactions    Dilaudid [Hydromorphone]      Chest pain/pressure       Objective     There were no vitals taken for this visit.      PHYSICAL EXAMINATION:    General Appearance:   Alert, cooperative, no distress   HEENT:  Normocephalic, atraumatic, anicteric. Neck supple, symmetrical, trachea midline.   Lungs:   Equal chest rise and unlabored breathing, normal effort, no coughing.   Cardiovascular:   No visualized JVD.   Abdomen:   No abdominal distension.   Skin:   No jaundice, rashes, or lesions.    Musculoskeletal:   Normal range of motion visualized.   Psych:  Normal affect and normal insight.   Neuro:  Alert and appropriate.           ASSESSMENT/PLAN:  This is a 35 y.o. year old male here for EGD, and he is stable and optimized for his procedure.
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
## 2025-02-14 NOTE — ANESTHESIA POSTPROCEDURE EVALUATION
Post-Op Assessment Note    CV Status:  Stable  Pain Score: 0    Pain management: adequate       Mental Status:  Alert and awake   Hydration Status:  Euvolemic   PONV Controlled:  Controlled   Airway Patency:  Patent     Post Op Vitals Reviewed: Yes    No anethesia notable event occurred.    Staff: CRNA           Last Filed PACU Vitals:  Vitals Value Taken Time   Temp 98    Pulse 85 02/14/25 0741   /70    Resp 9 02/14/25 0741   SpO2 95 % 02/14/25 0741   Vitals shown include unfiled device data.

## 2025-02-18 ENCOUNTER — RESULTS FOLLOW-UP (OUTPATIENT)
Dept: GASTROENTEROLOGY | Facility: CLINIC | Age: 35
End: 2025-02-18

## 2025-02-18 PROCEDURE — 88305 TISSUE EXAM BY PATHOLOGIST: CPT | Performed by: PATHOLOGY

## 2025-05-05 ENCOUNTER — TELEPHONE (OUTPATIENT)
Age: 35
End: 2025-05-05

## 2025-06-17 ENCOUNTER — TELEPHONE (OUTPATIENT)
Age: 35
End: 2025-06-17

## 2025-06-17 NOTE — TELEPHONE ENCOUNTER
Patients GI provider:  CASSANDRA Arreaga    Number to return call: [phone]    Reason for call: Pt called to see if there was a sooner appt at [facility]. I informed him there was not, but that he will remain on the waitlist.     Scheduled procedure/appointment date if applicable: Apt/procedure 07/02/25

## 2025-06-23 ENCOUNTER — OFFICE VISIT (OUTPATIENT)
Age: 35
End: 2025-06-23

## 2025-06-23 VITALS
HEART RATE: 83 BPM | HEIGHT: 73 IN | BODY MASS INDEX: 30.22 KG/M2 | WEIGHT: 228 LBS | TEMPERATURE: 99 F | DIASTOLIC BLOOD PRESSURE: 84 MMHG | SYSTOLIC BLOOD PRESSURE: 122 MMHG | OXYGEN SATURATION: 97 %

## 2025-06-23 DIAGNOSIS — R10.30 LOWER ABDOMINAL PAIN: ICD-10-CM

## 2025-06-23 DIAGNOSIS — K20.0 EOSINOPHILIC ESOPHAGITIS: ICD-10-CM

## 2025-06-23 DIAGNOSIS — D84.9 IMMUNOCOMPROMISED STATE (HCC): ICD-10-CM

## 2025-06-23 DIAGNOSIS — K51.90 ULCERATIVE COLITIS WITHOUT COMPLICATIONS, UNSPECIFIED LOCATION (HCC): Primary | ICD-10-CM

## 2025-06-23 DIAGNOSIS — E55.9 VITAMIN D DEFICIENCY: ICD-10-CM

## 2025-06-23 RX ORDER — OMEPRAZOLE 40 MG/1
40 CAPSULE, DELAYED RELEASE ORAL
Qty: 180 CAPSULE | Refills: 3 | Status: SHIPPED | OUTPATIENT
Start: 2025-06-23

## 2025-06-23 RX ORDER — DICYCLOMINE HYDROCHLORIDE 10 MG/1
10 CAPSULE ORAL 2 TIMES DAILY PRN
Qty: 60 CAPSULE | Refills: 3 | Status: SHIPPED | OUTPATIENT
Start: 2025-06-23

## 2025-06-23 RX ORDER — MULTIVIT-MIN/IRON/FOLIC ACID/K 18-600-40
2 CAPSULE ORAL DAILY
Qty: 180 CAPSULE | Refills: 3 | Status: SHIPPED | OUTPATIENT
Start: 2025-06-23

## 2025-06-23 NOTE — PROGRESS NOTES
Gastroenterology Outpatient Follow-up - Ulcerative Colitis  Robin Hector 35 y.o. male MRN: [id number]  Encounter: [id number]    Robin Hector is a 35 y.o. male with Ulcerative colitis.    Symptom onset:     Diagnosis:  ulcerative colitis  Year of diagnosis:  2019    IBD Summary: Robin Hector was diagnosed with ulcerative pancolitis in 2019 and is in endoscopic remission on adalimumab as of colonoscopy in 2023.  Of note, he was recently diagnosed with EOE on EGD in 2/2025.    Ulcerative Colitis Summary  Macroscopic extent of diseases: pancolitis  Microscopic extent of diseases:  pancolitis  Has the patient ever been hospitalized for severe disease:   Unknown      Surgical History  Number of IBD surgeries: 0      First IBD surgery:    Most Recent IBD surgery:    Esophageal:  0    Gastroduodenal:  0    Small bowel resection(s):  0    Ileocolonic resection(s): 0      Colonic resection(s):  0    Ileostomy or colostomy:  no previous ostomy    Complete colectomy:  No         Medications     Year last used Reason for discontinuation   Corticosteroids             Thiopurines             Methotrexate             Infliximab             Adalimumab             Certolizumab             Golimumab             Natalizumab             Mesalamine             Sulfasalzine             Vedolizumab             Ustekinumab             Tofacitinib             Other biologic                 Extraintestinal Manifestations    IBD-associated arthropathy  Unknown   Uveitis No    Oral aphthous ulcers No   Erythema nodosum No    Pyoderma gangrenosum No    Primary sclerosing cholangitis No    Thrombotic complications No          Cancer / Dysplasia History  History of IBD-associated dysplasia: none    Date of diagnosis (Year):     History of colorectal cancer: No   History of cervical dysplasia: No   History of skin cancer: none         Laboratory Data   Most recent (date) Result   PPD       Quanitferon gold 8/28/2023 negative   TPMT        Hepatitis A       HBsAb       HBcAb 8/28/2023 negative   HBsAg 8/28/2023 negative   HCV Ab 8/28/2023 negative       Imaging / Diagnostic Procedures   Most recent (date) Findings   Colonoscopy or sigmoidoscopy #1 7/28/2023            Ulcers/Erosions  no erosions           Strictures  none           Stricture Severity  N/A           Endoscopic Score Do Score:  0 Rutgeert's:  N/A            Findings  1) The terminal ileum and entire colon appeared normal. Performed random biopsy using biopsy forceps.  2) Multiple pseudopolyps measuring smaller than 5 mm in the ascending colon and transverse colon; performed cold forceps biopsy           Pathology  A. Terminal Ileum, cold fcp biopsy:  - Benign colonic mucosa.  - Negative for active colitis, dysplasia or carcinoma.     B. Large Intestine, Right/Ascending Colon, cold fcp biopsy of polyp x1:  - Benign colonic mucosa.  - Negative for active colitis, dysplasia or carcinoma.      C. Large Intestine, Right/Ascending Colon, cold fcp biopsy:  - Benign colonic mucosa.  - Negative for active colitis, dysplasia or carcinoma.      D. Large Intestine, Cecum, cold fcp biopsy:  - Benign colonic mucosa.  - Negative for active colitis, dysplasia or carcinoma.      E. Large Intestine, Transverse Colon, cold fcp biopsy:  - Benign colonic mucosa.  - Negative for active colitis, dysplasia or carcinoma.      F. Large Intestine, Left/Descending Colon, cold fcp biopsy:  - Benign colonic mucosa.  - Negative for active colitis, dysplasia or carcinoma.      G. Large Intestine, Sigmoid Colon, cold fcp biopsy:  - Benign colonic mucosa.  - Negative for active colitis, dysplasia or carcinoma.      H. Rectum, cold fcp biopsy:  - Benign colonic mucosa.  - Negative for active colitis, dysplasia or carcinoma.   Colonoscopy or sigmoidoscopy #2              Ulcers/Erosions                 Strictures                 Stricture Severity              Endoscopic Score Do Score:    Rugeert's:               Findings              Pathology      EGD 2/14/2025 Mild, generalized abnormal mucosa with concentric rings and exudate in the upper third of the esophagus and lower third of the esophagus. Normal stomach and duodenum.  2 esophageal biopsies noted 35-45 eos/hpf.   Small bowel follow-through       CT enterography       CT without enterography       MRI enterography       Capsule study       DEXA scan   Lowest Z-score:      CXR (for TB)           HPI:   Interval History:  6/23/2025 Follow up  Robin continues on adalimumab 40 mg every 14 days.  He feels symptomatic at present.  He has 1-2 BMs/day on average but sometimes up to 3.  Stools are sometimes formed but often soft/mushy.  He has urgency at times, often after meals.  Also sees mucus in the stool at times, sometimes red-tinged.  Previous abdominal pain has improved.  He has frequent tenesmus/cramping in the rectum.  He has chronic fatigue.  He has chronic joint pains as well.  He reports frequent heartburn and also experiences food boluses that require him to regurgitate/vomit to resolve.  He follows with dermatology, last saw them in 11/2024.    Robin reports the following symptoms over the last 7 days:    Stool Frequency 1-2 stools/day more than normal   Average stools per day: 3   Average liquid stools per day: 0   Consistency of bowel: soft or semi-formed   Awakening from sleep to move bowels? Yes   Urgency moderate fecal urgency   Visible blood in stool? none   Abdominal pain? none   General Wellbeing? slightly under par     Robin also reports the following symptoms in the last month:    Leakage of stool while sleeping? No   Leakage of stool while awake? No       REVIEW OF SYSTEMS:  During the last 7 days, Robin experienced the following symptoms:  Unintentional Weight Loss (in the last month) No   Fever No   Eye irritation No   Mouth sores No   Sore throat Yes   Chest pain No   Shortness of breath No   Numbness or tingling in hands or feet No   Skin  "rash No   Pain or swelling in joints Yes   Bruising or bleeding Yes   Felt depressed or blue No   Fatigue Yes   Dysuria No   Please see HPI for additional pertinent review of systems; otherwise remainder of ROS was unremarkable    MEDICATIONS:  Current Medications[1]    ALLERGIES:  Allergies[2]    OBJECTIVE:  /84 (BP Location: Left arm, Patient Position: Sitting, Cuff Size: Large)   Pulse 83   Temp 99 °F (37.2 °C) (Tympanic)   Ht 6' 1\" (1.854 m)   Wt 103 kg (228 lb)   SpO2 97%   BMI 30.08 kg/m²      PHYSICAL EXAM:    General Appearance:   Alert, cooperative, no distress   HEENT:   Normocephalic, atraumatic, anicteric.     Neck:  Supple, symmetrical, trachea midline   Lungs:   Respirations unlabored    Extremities:  No cyanosis, clubbing or edema    Pulses:  2+ and symmetric    Skin:  No jaundice, rashes, or lesions      Lab Results   Component Value Date    WBC 9.17 07/16/2024    HGB 17.0 07/16/2024    HCT 50.3 (H) 07/16/2024    MCV 88 07/16/2024     07/16/2024     Lab Results   Component Value Date     03/31/2015    SODIUM 140 07/16/2024    K 3.6 07/16/2024     07/16/2024    CO2 26 07/16/2024    ANIONGAP 9 03/31/2015    AGAP 12 07/16/2024    BUN 14 07/16/2024    CREATININE 1.23 07/16/2024    GLUC 94 07/16/2024    GLUF 97 04/20/2024    CALCIUM 10.0 07/16/2024    AST 18 07/16/2024    ALT 16 07/16/2024    ALKPHOS 70 07/16/2024    PROT 7.4 03/31/2015    TP 8.7 (H) 07/16/2024    BILITOT 0.6 03/31/2015    TBILI 0.56 07/16/2024    EGFR 77 07/16/2024     Lab Results   Component Value Date    CRP <1.0 04/20/2024     Lab Results   Component Value Date    TDZIAOZX12 328 04/20/2024     Lab Results   Component Value Date    FERRITIN 74 04/20/2024       ASSESSMENT AND PLAN:    Robin has a history of macroscopic pancolitis and microscopic pancolitis ulcerative colitis diagnosed in 2019. Current medical therapy is with adalimumab every 14 days. My global assessment is that the clinical disease is " currently mildy active.     The 6-point Do score was 1 and the 9-point Do score was 2.  The short CDAI was 93.      Robin Hector was diagnosed with ulcerative pancolitis in 2019 and is in endoscopic remission on adalimumab as of colonoscopy in 2023.  He is on adalimumab 40 mg very 14 day but remains clinically symptomatic with urgency, tenesmus, rectal cramping, and soft/loose stools at times.  Sometimes sees red-tinged mucus in stool.  Of note, he was recently diagnosed with EOE on EGD in 2/2025.  He has a history of heartburn and food boluses.    1.  Continue adalimumab 40 mg every 14 days.  2.  We discussed scheduling colonoscopy to evaluate his current symptoms, but patient would like to defer if possible.  Therefore, I recommend scheduling intestinal ultrasound and checking fecal calprotectin.  3.  Also check CBC, CMP, CRP, vitamin levels, and update quant gold and chronic hepatitis panel.  4.  We discussed results of EGD showing EOE at length.  I recommend starting omeprazole 40 mg twice daily.  We discussed safety profile of PPI's, including side effects such as vitamin/mineral deficiencies and infectious gastroenteritis.  I recommend repeating EGD in 3 months to evaluate for improvement.  Patient is unsure if he would want to do this.  5.  Continue vitamin D supplements.  6.  Continue to follow up with dermatology once annually.  7.  Recommend routine vaccinations.    Return in 3 months.        Health Maintenance Recommendations:  Vaccines & Infections  COVID-19 vaccination and boosters are recommended. There is no evidence that the COVID-19 vaccine would cause an IBD flare.  Avoid live vaccines if on immunosuppressive therapy.  Yearly influenza vaccine (flu shot).  Pneumonia vaccines for patients on immunosuppression. These include Prevnar 20, followed by Pneumovax 23 at least 8 weeks later.  Shingrix vaccine (series of 2 injections) for al patients 65 and older. Patients on tofacitinib or  upadacitinib should be vaccinated regardless of age.  If not immune to measles mumps or rubella, MMR vaccine is recommended. However, this is a live vaccine and should be given prior to immunosuppressive therapy.  HPV vaccination as per national guidelines.  Hepatitis A and B vaccinations if not previously vaccinated.  Testing for tuberculosis with QuantiFERON Gold blood test and/or chest xray prior to starting immunosuppressive medications, and then annually    Cancer screening  Dysplasia surveillance for colorectal cancer. Colonoscopy in all patients with extensive colitis (more than 1/3 of the colon involved) who had disease for at least 8 or more years.  Repeat colonoscopy approximately every 12-24 months.  In patients with concurrent primary sclerosing cholangitis, history of dysplasia, or family history of colon cancer, repeat colonoscopy annually.    Females: Pap smear annually for woman on immunosuppression.  Annual dermatologic/skin exam in all patients with IBD, especially those on immunosuppression with thiopurines or DAVID inhibitors.    Miscellaneous  DEXA scan, once off steroids for 3 months  Depression screening recommended annually  Routine dental and ophthalmology examinations    Problem List Items Addressed This Visit        Digestive    Ulcerative colitis (HCC) - Primary    Relevant Medications    omeprazole (PriLOSEC) 40 MG capsule    dicyclomine (BENTYL) 10 mg capsule       Other    Immunocompromised state (HCC)   Other Visit Diagnoses       Eosinophilic esophagitis        Relevant Medications    omeprazole (PriLOSEC) 40 MG capsule    dicyclomine (BENTYL) 10 mg capsule      Vitamin D deficiency        Relevant Medications    Vitamin D, Cholecalciferol, 50 MCG (2000 UT) CAPS      Lower abdominal pain        Relevant Medications    dicyclomine (BENTYL) 10 mg capsule          Myke Alfonso PA-C          [1]    Current Outpatient Medications:   •  dicyclomine (BENTYL) 10 mg capsule  •  Humira, 2  Pen, 40 MG/0.4ML AJKT  •  omeprazole (PriLOSEC) 40 MG capsule  •  Vitamin D, Cholecalciferol, 50 MCG (2000 UT) CAPS[2]  Allergies  Allergen Reactions   • Dilaudid [Hydromorphone]      Chest pain/pressure

## 2025-06-27 DIAGNOSIS — K51.019 ULCERATIVE PANCOLITIS WITH COMPLICATION (HCC): ICD-10-CM

## 2025-06-27 RX ORDER — ADALIMUMAB 40MG/0.4ML
KIT SUBCUTANEOUS
Qty: 2 EACH | Refills: 5 | Status: SHIPPED | OUTPATIENT
Start: 2025-06-27